# Patient Record
Sex: MALE | Race: BLACK OR AFRICAN AMERICAN | NOT HISPANIC OR LATINO
[De-identification: names, ages, dates, MRNs, and addresses within clinical notes are randomized per-mention and may not be internally consistent; named-entity substitution may affect disease eponyms.]

---

## 2017-01-23 ENCOUNTER — APPOINTMENT (OUTPATIENT)
Dept: OTOLARYNGOLOGY | Facility: CLINIC | Age: 42
End: 2017-01-23

## 2017-01-23 VITALS
SYSTOLIC BLOOD PRESSURE: 143 MMHG | WEIGHT: 249 LBS | HEIGHT: 74 IN | HEART RATE: 93 BPM | DIASTOLIC BLOOD PRESSURE: 95 MMHG | BODY MASS INDEX: 31.95 KG/M2

## 2017-03-23 ENCOUNTER — APPOINTMENT (OUTPATIENT)
Dept: OTOLARYNGOLOGY | Facility: CLINIC | Age: 42
End: 2017-03-23

## 2017-03-23 VITALS
HEART RATE: 103 BPM | DIASTOLIC BLOOD PRESSURE: 90 MMHG | SYSTOLIC BLOOD PRESSURE: 133 MMHG | HEIGHT: 74 IN | WEIGHT: 249 LBS | BODY MASS INDEX: 31.95 KG/M2

## 2017-04-14 ENCOUNTER — OUTPATIENT (OUTPATIENT)
Dept: OUTPATIENT SERVICES | Facility: HOSPITAL | Age: 42
LOS: 1 days | End: 2017-04-14
Payer: COMMERCIAL

## 2017-04-14 VITALS
TEMPERATURE: 99 F | HEIGHT: 74 IN | DIASTOLIC BLOOD PRESSURE: 110 MMHG | SYSTOLIC BLOOD PRESSURE: 150 MMHG | RESPIRATION RATE: 15 BRPM | HEART RATE: 95 BPM | WEIGHT: 250 LBS | OXYGEN SATURATION: 97 %

## 2017-04-14 DIAGNOSIS — I10 ESSENTIAL (PRIMARY) HYPERTENSION: ICD-10-CM

## 2017-04-14 DIAGNOSIS — R94.31 ABNORMAL ELECTROCARDIOGRAM [ECG] [EKG]: ICD-10-CM

## 2017-04-14 DIAGNOSIS — J38.7 OTHER DISEASES OF LARYNX: ICD-10-CM

## 2017-04-14 DIAGNOSIS — Z87.19 PERSONAL HISTORY OF OTHER DISEASES OF THE DIGESTIVE SYSTEM: Chronic | ICD-10-CM

## 2017-04-14 DIAGNOSIS — E11.9 TYPE 2 DIABETES MELLITUS WITHOUT COMPLICATIONS: ICD-10-CM

## 2017-04-14 LAB
BUN SERPL-MCNC: 12 MG/DL — SIGNIFICANT CHANGE UP (ref 7–23)
CALCIUM SERPL-MCNC: 9.2 MG/DL — SIGNIFICANT CHANGE UP (ref 8.4–10.5)
CHLORIDE SERPL-SCNC: 99 MMOL/L — SIGNIFICANT CHANGE UP (ref 98–107)
CO2 SERPL-SCNC: 27 MMOL/L — SIGNIFICANT CHANGE UP (ref 22–31)
CREAT SERPL-MCNC: 1 MG/DL — SIGNIFICANT CHANGE UP (ref 0.5–1.3)
GLUCOSE SERPL-MCNC: 189 MG/DL — HIGH (ref 70–99)
HBA1C BLD-MCNC: 9.9 % — HIGH (ref 4–5.6)
HCT VFR BLD CALC: 40.1 % — SIGNIFICANT CHANGE UP (ref 39–50)
HGB BLD-MCNC: 12.9 G/DL — LOW (ref 13–17)
HIV1 AG SER QL: SIGNIFICANT CHANGE UP
HIV1+2 AB SPEC QL: SIGNIFICANT CHANGE UP
MCHC RBC-ENTMCNC: 30 PG — SIGNIFICANT CHANGE UP (ref 27–34)
MCHC RBC-ENTMCNC: 32.2 % — SIGNIFICANT CHANGE UP (ref 32–36)
MCV RBC AUTO: 93.3 FL — SIGNIFICANT CHANGE UP (ref 80–100)
PLATELET # BLD AUTO: 198 K/UL — SIGNIFICANT CHANGE UP (ref 150–400)
PMV BLD: 10.4 FL — SIGNIFICANT CHANGE UP (ref 7–13)
POTASSIUM SERPL-MCNC: 4.1 MMOL/L — SIGNIFICANT CHANGE UP (ref 3.5–5.3)
POTASSIUM SERPL-SCNC: 4.1 MMOL/L — SIGNIFICANT CHANGE UP (ref 3.5–5.3)
RBC # BLD: 4.3 M/UL — SIGNIFICANT CHANGE UP (ref 4.2–5.8)
RBC # FLD: 13.5 % — SIGNIFICANT CHANGE UP (ref 10.3–14.5)
SODIUM SERPL-SCNC: 142 MMOL/L — SIGNIFICANT CHANGE UP (ref 135–145)
WBC # BLD: 5.76 K/UL — SIGNIFICANT CHANGE UP (ref 3.8–10.5)
WBC # FLD AUTO: 5.76 K/UL — SIGNIFICANT CHANGE UP (ref 3.8–10.5)

## 2017-04-14 PROCEDURE — 71020: CPT | Mod: 26

## 2017-04-14 PROCEDURE — 93010 ELECTROCARDIOGRAM REPORT: CPT

## 2017-04-14 NOTE — H&P PST ADULT - PROBLEM SELECTOR PLAN 2
Patient instructed not to take Metformin the night before and in am of surgery, not to take any diabetic medication in am of surgery, verbalized understanding  Will check FSBS in am of surgery

## 2017-04-14 NOTE — H&P PST ADULT - PROBLEM SELECTOR PLAN 3
/110 mmHg, to continue medication and to take in am of surgery  Patient instructed to obtain medical clearance

## 2017-04-14 NOTE — H&P PST ADULT - FAMILY HISTORY
Father  Still living? Unknown  Diabetes mellitus, Age at diagnosis: Age Unknown     Mother  Still living? Unknown  Hypertension, Age at diagnosis: Age Unknown

## 2017-04-14 NOTE — H&P PST ADULT - PROBLEM SELECTOR PLAN 1
41 yr old male scheduled for Microdirect Laryngoscopy with excision right vocal cord tumor , Co2 laser on 4/26/2017  Pre op instruction given and patient verbalized understanding  4 Positive on STOP BANG questioner, MAR precaution recommended, OR booking notified  Allergy to IV contrast dye, OR booking notified

## 2017-04-14 NOTE — H&P PST ADULT - HISTORY OF PRESENT ILLNESS
41 yr old male with PMH HTN, GERD, HLD,DM type 2presents to PST with pre op dx: other disease of Larynx for pre surgical evaluation and scheduled for Microdirect laryngoscopy with excision right vocal cord tumor 41 yr old male with PMH HTN, GERD, HLD, DM type 2presents to PST h/o hoarseness of voice since 9/2016, ENT evaluation with pre op dx: other disease of Larynx for pre surgical evaluation and scheduled for Microdirect laryngoscopy with excision right vocal cord tumor on 4/26/2017

## 2017-04-14 NOTE — H&P PST ADULT - NEGATIVE ENMT SYMPTOMS
no post-nasal discharge/no nose bleeds/no hearing difficulty/no vertigo/no gum bleeding/no ear pain/no sinus symptoms/no tinnitus/no nasal congestion/no throat pain/no nasal discharge

## 2017-04-14 NOTE — H&P PST ADULT - NSANTHOSAYNRD_GEN_A_CORE
No. MAR screening performed.  STOP BANG Legend: 0-2 = LOW Risk; 3-4 = INTERMEDIATE Risk; 5-8 = HIGH Risk

## 2017-04-14 NOTE — H&P PST ADULT - END GEN HX ROS MEA POS PC
voice change/c/o voice change since 9/2016 voice change/c/o voice change since 9/2016, stated I have a growth in my vocal cord

## 2017-04-25 ENCOUNTER — RESULT REVIEW (OUTPATIENT)
Age: 42
End: 2017-04-25

## 2017-04-26 ENCOUNTER — INPATIENT (INPATIENT)
Facility: HOSPITAL | Age: 42
LOS: 0 days | Discharge: HOME | End: 2017-04-27
Attending: OTOLARYNGOLOGY | Admitting: OTOLARYNGOLOGY
Payer: COMMERCIAL

## 2017-04-26 ENCOUNTER — RESULT REVIEW (OUTPATIENT)
Age: 42
End: 2017-04-26

## 2017-04-26 ENCOUNTER — APPOINTMENT (OUTPATIENT)
Dept: OTOLARYNGOLOGY | Facility: HOSPITAL | Age: 42
End: 2017-04-26

## 2017-04-26 VITALS
HEART RATE: 100 BPM | OXYGEN SATURATION: 97 % | TEMPERATURE: 99 F | RESPIRATION RATE: 17 BRPM | WEIGHT: 250 LBS | SYSTOLIC BLOOD PRESSURE: 144 MMHG | HEIGHT: 74 IN | DIASTOLIC BLOOD PRESSURE: 90 MMHG

## 2017-04-26 DIAGNOSIS — Z87.19 PERSONAL HISTORY OF OTHER DISEASES OF THE DIGESTIVE SYSTEM: Chronic | ICD-10-CM

## 2017-04-26 DIAGNOSIS — J38.7 OTHER DISEASES OF LARYNX: ICD-10-CM

## 2017-04-26 PROCEDURE — 88367 INSITU HYBRIDIZATION AUTO: CPT | Mod: 26

## 2017-04-26 PROCEDURE — 88365 INSITU HYBRIDIZATION (FISH): CPT | Mod: 26,59

## 2017-04-26 PROCEDURE — 88360 TUMOR IMMUNOHISTOCHEM/MANUAL: CPT | Mod: 26

## 2017-04-26 PROCEDURE — 88342 IMHCHEM/IMCYTCHM 1ST ANTB: CPT | Mod: 26,59

## 2017-04-26 PROCEDURE — 88305 TISSUE EXAM BY PATHOLOGIST: CPT | Mod: 26

## 2017-04-26 PROCEDURE — 88364 INSITU HYBRIDIZATION (FISH): CPT | Mod: 26

## 2017-04-26 PROCEDURE — 88187 FLOWCYTOMETRY/READ 2-8: CPT

## 2017-04-26 PROCEDURE — 88341 IMHCHEM/IMCYTCHM EA ADD ANTB: CPT | Mod: 26,59

## 2017-04-26 RX ORDER — SODIUM CHLORIDE 9 MG/ML
1000 INJECTION, SOLUTION INTRAVENOUS
Qty: 0 | Refills: 0 | Status: DISCONTINUED | OUTPATIENT
Start: 2017-04-26 | End: 2017-04-27

## 2017-04-26 RX ORDER — INSULIN LISPRO 100/ML
VIAL (ML) SUBCUTANEOUS
Qty: 0 | Refills: 0 | Status: DISCONTINUED | OUTPATIENT
Start: 2017-04-26 | End: 2017-04-27

## 2017-04-26 RX ORDER — FENTANYL CITRATE 50 UG/ML
25 INJECTION INTRAVENOUS
Qty: 0 | Refills: 0 | Status: DISCONTINUED | OUTPATIENT
Start: 2017-04-26 | End: 2017-04-26

## 2017-04-26 RX ORDER — DEXTROSE 50 % IN WATER 50 %
12.5 SYRINGE (ML) INTRAVENOUS ONCE
Qty: 0 | Refills: 0 | Status: DISCONTINUED | OUTPATIENT
Start: 2017-04-26 | End: 2017-04-27

## 2017-04-26 RX ORDER — ACETAMINOPHEN 500 MG
650 TABLET ORAL EVERY 6 HOURS
Qty: 0 | Refills: 0 | Status: DISCONTINUED | OUTPATIENT
Start: 2017-04-26 | End: 2017-04-27

## 2017-04-26 RX ORDER — DEXTROSE 50 % IN WATER 50 %
1 SYRINGE (ML) INTRAVENOUS ONCE
Qty: 0 | Refills: 0 | Status: DISCONTINUED | OUTPATIENT
Start: 2017-04-26 | End: 2017-04-27

## 2017-04-26 RX ORDER — ACETAMINOPHEN 500 MG
2 TABLET ORAL
Qty: 0 | Refills: 0 | COMMUNITY
Start: 2017-04-26

## 2017-04-26 RX ORDER — RANITIDINE HYDROCHLORIDE 150 MG/1
150 TABLET, FILM COATED ORAL AT BEDTIME
Qty: 0 | Refills: 0 | Status: DISCONTINUED | OUTPATIENT
Start: 2017-04-26 | End: 2017-04-27

## 2017-04-26 RX ORDER — ATORVASTATIN CALCIUM 80 MG/1
20 TABLET, FILM COATED ORAL AT BEDTIME
Qty: 0 | Refills: 0 | Status: DISCONTINUED | OUTPATIENT
Start: 2017-04-26 | End: 2017-04-27

## 2017-04-26 RX ORDER — DEXTROSE 50 % IN WATER 50 %
25 SYRINGE (ML) INTRAVENOUS ONCE
Qty: 0 | Refills: 0 | Status: DISCONTINUED | OUTPATIENT
Start: 2017-04-26 | End: 2017-04-27

## 2017-04-26 RX ORDER — GLUCAGON INJECTION, SOLUTION 0.5 MG/.1ML
1 INJECTION, SOLUTION SUBCUTANEOUS ONCE
Qty: 0 | Refills: 0 | Status: DISCONTINUED | OUTPATIENT
Start: 2017-04-26 | End: 2017-04-27

## 2017-04-26 RX ORDER — LISINOPRIL 2.5 MG/1
20 TABLET ORAL DAILY
Qty: 0 | Refills: 0 | Status: DISCONTINUED | OUTPATIENT
Start: 2017-04-26 | End: 2017-04-27

## 2017-04-26 RX ADMIN — ATORVASTATIN CALCIUM 20 MILLIGRAM(S): 80 TABLET, FILM COATED ORAL at 21:14

## 2017-04-26 RX ADMIN — RANITIDINE HYDROCHLORIDE 150 MILLIGRAM(S): 150 TABLET, FILM COATED ORAL at 22:14

## 2017-04-26 RX ADMIN — Medication 3: at 22:14

## 2017-04-26 RX ADMIN — LISINOPRIL 20 MILLIGRAM(S): 2.5 TABLET ORAL at 22:14

## 2017-04-26 NOTE — ASU DISCHARGE PLAN (ADULT/PEDIATRIC). - MEDICATION SUMMARY - MEDICATIONS TO TAKE
I will START or STAY ON the medications listed below when I get home from the hospital:    acetaminophen 325 mg oral tablet  -- 2 tab(s) by mouth every 6 hours, As needed, Mild Pain (1 - 3)  -- Indication: For pain    lisinopril 20 mg oral tablet  -- 1 tab(s) by mouth once a day pm  -- Indication: For home    metFORMIN 1000 mg oral tablet  -- 1 tab(s) by mouth 2 times a day  -- Indication: For home    glipiZIDE 5 mg oral tablet  -- 1 tab(s) by mouth 2 times a day  -- Indication: For home    atorvastatin 20 mg oral tablet  -- 1 tab(s) by mouth once a day (at bedtime)  -- Indication: For home    raNITIdine 150 mg oral tablet  -- 1 tab(s) by mouth once a day (at bedtime)  -- Indication: For home    mometasone 50 mcg/inh nasal spray  -- 2 spray(s) into nose once a day, As Needed  -- Indication: For home

## 2017-04-27 ENCOUNTER — TRANSCRIPTION ENCOUNTER (OUTPATIENT)
Age: 42
End: 2017-04-27

## 2017-04-27 VITALS
HEART RATE: 88 BPM | TEMPERATURE: 98 F | SYSTOLIC BLOOD PRESSURE: 134 MMHG | DIASTOLIC BLOOD PRESSURE: 92 MMHG | OXYGEN SATURATION: 100 % | RESPIRATION RATE: 18 BRPM

## 2017-04-27 RX ADMIN — Medication 1: at 08:46

## 2017-04-27 NOTE — DISCHARGE NOTE ADULT - MEDICATION SUMMARY - MEDICATIONS TO TAKE
I will START or STAY ON the medications listed below when I get home from the hospital:    acetaminophen 325 mg oral tablet  -- 2 tab(s) by mouth every 6 hours, As needed, Mild Pain (1 - 3)  -- Indication: For pain    lisinopril 20 mg oral tablet  -- 1 tab(s) by mouth once a day pm  -- Indication: For HTN    metFORMIN 1000 mg oral tablet  -- 1 tab(s) by mouth 2 times a day  -- Indication: For DM    glipiZIDE 5 mg oral tablet  -- 1 tab(s) by mouth 2 times a day  -- Indication: For DM    atorvastatin 20 mg oral tablet  -- 1 tab(s) by mouth once a day (at bedtime)  -- Indication: For cholesterol    raNITIdine 150 mg oral tablet  -- 1 tab(s) by mouth once a day (at bedtime)  -- Indication: For Outpt med    mometasone 50 mcg/inh nasal spray  -- 2 spray(s) into nose once a day, As Needed  -- Indication: For Outpt med

## 2017-04-27 NOTE — DISCHARGE NOTE ADULT - PATIENT PORTAL LINK FT
“You can access the Follow[employer] Patient Portal, offered by [facility], by registering with the following website: [email or web address]”

## 2017-04-27 NOTE — DISCHARGE NOTE ADULT - CARE PROVIDER_API CALL
Olvin Onofre (MD; PhD), Otolaryngology  [address] [facility] [place] [address]  Phone: [phone]  Fax: [phone]

## 2017-04-27 NOTE — DISCHARGE NOTE ADULT - HOSPITAL COURSE
41 year old male S/P direct laryngoscopy and biopsy of vocal cord lesion on 4/25/17. Discharged home ob 4/26/17. 41 year old male S/P direct laryngoscopy and biopsy of vocal cord lesion on 4/26/17. Discharged home ob 4/27/17.

## 2017-04-27 NOTE — DISCHARGE NOTE ADULT - CARE PLAN
Principal Discharge DX:	Vocal cord polyp  Goal:	biopsy of lesion  Instructions for follow-up, activity and diet:	ADA diet

## 2017-04-29 ENCOUNTER — TRANSCRIPTION ENCOUNTER (OUTPATIENT)
Age: 42
End: 2017-04-29

## 2017-05-04 ENCOUNTER — APPOINTMENT (OUTPATIENT)
Dept: OTOLARYNGOLOGY | Facility: CLINIC | Age: 42
End: 2017-05-04

## 2017-05-04 VITALS
HEART RATE: 103 BPM | HEIGHT: 74 IN | SYSTOLIC BLOOD PRESSURE: 148 MMHG | WEIGHT: 250 LBS | DIASTOLIC BLOOD PRESSURE: 94 MMHG | BODY MASS INDEX: 32.08 KG/M2

## 2017-05-11 ENCOUNTER — APPOINTMENT (OUTPATIENT)
Dept: OTOLARYNGOLOGY | Facility: CLINIC | Age: 42
End: 2017-05-11

## 2017-06-01 ENCOUNTER — APPOINTMENT (OUTPATIENT)
Dept: OTOLARYNGOLOGY | Facility: CLINIC | Age: 42
End: 2017-06-01

## 2017-06-01 VITALS
DIASTOLIC BLOOD PRESSURE: 92 MMHG | HEART RATE: 100 BPM | RESPIRATION RATE: 18 BRPM | SYSTOLIC BLOOD PRESSURE: 148 MMHG | HEIGHT: 74 IN | WEIGHT: 25 LBS | BODY MASS INDEX: 3.21 KG/M2

## 2017-07-05 ENCOUNTER — LABORATORY RESULT (OUTPATIENT)
Age: 42
End: 2017-07-05

## 2017-07-05 ENCOUNTER — APPOINTMENT (OUTPATIENT)
Dept: RHEUMATOLOGY | Facility: CLINIC | Age: 42
End: 2017-07-05

## 2017-07-05 VITALS
HEART RATE: 106 BPM | HEIGHT: 74 IN | DIASTOLIC BLOOD PRESSURE: 102 MMHG | OXYGEN SATURATION: 98 % | TEMPERATURE: 98.9 F | BODY MASS INDEX: 31.44 KG/M2 | SYSTOLIC BLOOD PRESSURE: 154 MMHG | WEIGHT: 245 LBS

## 2017-07-05 LAB
ALBUMIN SERPL ELPH-MCNC: 4.5 G/DL
ALP BLD-CCNC: 47 U/L
ALT SERPL-CCNC: 27 U/L
ANION GAP SERPL CALC-SCNC: 17 MMOL/L
APPEARANCE: CLEAR
APPEARANCE: CLEAR
AST SERPL-CCNC: 17 U/L
BASOPHILS # BLD AUTO: 0.01 K/UL
BASOPHILS NFR BLD AUTO: 0.2 %
BILIRUB SERPL-MCNC: 0.3 MG/DL
BILIRUBIN URINE: NEGATIVE
BILIRUBIN URINE: NEGATIVE
BLOOD URINE: NEGATIVE
BLOOD URINE: NEGATIVE
BUN SERPL-MCNC: 12 MG/DL
CALCIUM SERPL-MCNC: 10.1 MG/DL
CHLORIDE SERPL-SCNC: 103 MMOL/L
CO2 SERPL-SCNC: 23 MMOL/L
COLOR: YELLOW
COLOR: YELLOW
CREAT SERPL-MCNC: 1.05 MG/DL
CRP SERPL-MCNC: 0.5 MG/DL
EOSINOPHIL # BLD AUTO: 0.17 K/UL
EOSINOPHIL NFR BLD AUTO: 2.6 %
ERYTHROCYTE [SEDIMENTATION RATE] IN BLOOD BY WESTERGREN METHOD: 14 MM/HR
GLUCOSE QUALITATIVE U: 1000 MG/DL
GLUCOSE QUALITATIVE U: 1000 MG/DL
GLUCOSE SERPL-MCNC: 104 MG/DL
HCT VFR BLD CALC: 41.3 %
HGB BLD-MCNC: 13.8 G/DL
IMM GRANULOCYTES NFR BLD AUTO: 0.2 %
KETONES URINE: ABNORMAL
KETONES URINE: ABNORMAL
LEUKOCYTE ESTERASE URINE: NEGATIVE
LEUKOCYTE ESTERASE URINE: NEGATIVE
LYMPHOCYTES # BLD AUTO: 2.8 K/UL
LYMPHOCYTES NFR BLD AUTO: 43.1 %
MAN DIFF?: NORMAL
MCHC RBC-ENTMCNC: 30.4 PG
MCHC RBC-ENTMCNC: 33.4 GM/DL
MCV RBC AUTO: 91 FL
MONOCYTES # BLD AUTO: 0.63 K/UL
MONOCYTES NFR BLD AUTO: 9.7 %
NEUTROPHILS # BLD AUTO: 2.88 K/UL
NEUTROPHILS NFR BLD AUTO: 44.2 %
NITRITE URINE: NEGATIVE
NITRITE URINE: NEGATIVE
PH URINE: 5.5
PH URINE: 5.5
PLATELET # BLD AUTO: 245 K/UL
POTASSIUM SERPL-SCNC: 4.4 MMOL/L
PROT SERPL-MCNC: 7.4 G/DL
PROTEIN URINE: NEGATIVE MG/DL
PROTEIN URINE: NEGATIVE MG/DL
RBC # BLD: 4.54 M/UL
RBC # FLD: 13.3 %
SODIUM SERPL-SCNC: 143 MMOL/L
SPECIFIC GRAVITY URINE: 1.04
SPECIFIC GRAVITY URINE: 1.04
UROBILINOGEN URINE: NORMAL MG/DL
UROBILINOGEN URINE: NORMAL MG/DL
WBC # FLD AUTO: 6.5 K/UL

## 2017-07-06 LAB
25(OH)D3 SERPL-MCNC: 25.7 NG/ML
C3 SERPL-MCNC: 134 MG/DL
C4 SERPL-MCNC: 36 MG/DL
CREAT SPEC-SCNC: 268 MG/DL
CREAT/PROT UR: 0.1 RATIO
DSDNA AB SER-ACNC: <12 IU/ML
ENA RNP AB SER IA-ACNC: <0.2 AL
ENA SCL70 IGG SER IA-ACNC: <0.2 AL
ENA SM AB SER IA-ACNC: <0.2 AL
ENA SS-A AB SER IA-ACNC: <0.2 AL
ENA SS-B AB SER IA-ACNC: <0.2 AL
MPO AB + PR3 PNL SER: NORMAL
PROT UR-MCNC: 15 MG/DL
PROT UR-MCNC: 15 MG/DL

## 2017-07-07 LAB
ACE BLD-CCNC: 27 U/L
ANA SER IF-ACNC: NEGATIVE

## 2017-07-08 ENCOUNTER — APPOINTMENT (OUTPATIENT)
Dept: CT IMAGING | Facility: IMAGING CENTER | Age: 42
End: 2017-07-08

## 2017-07-08 ENCOUNTER — OUTPATIENT (OUTPATIENT)
Dept: OUTPATIENT SERVICES | Facility: HOSPITAL | Age: 42
LOS: 1 days | End: 2017-07-08

## 2017-07-08 DIAGNOSIS — Z87.19 PERSONAL HISTORY OF OTHER DISEASES OF THE DIGESTIVE SYSTEM: Chronic | ICD-10-CM

## 2017-07-08 DIAGNOSIS — M31.30 WEGENER'S GRANULOMATOSIS WITHOUT RENAL INVOLVEMENT: ICD-10-CM

## 2017-07-13 ENCOUNTER — APPOINTMENT (OUTPATIENT)
Dept: OTOLARYNGOLOGY | Facility: CLINIC | Age: 42
End: 2017-07-13

## 2017-07-19 ENCOUNTER — FORM ENCOUNTER (OUTPATIENT)
Age: 42
End: 2017-07-19

## 2017-07-20 ENCOUNTER — APPOINTMENT (OUTPATIENT)
Dept: CT IMAGING | Facility: IMAGING CENTER | Age: 42
End: 2017-07-20

## 2017-07-20 ENCOUNTER — OUTPATIENT (OUTPATIENT)
Dept: OUTPATIENT SERVICES | Facility: HOSPITAL | Age: 42
LOS: 1 days | End: 2017-07-20
Payer: COMMERCIAL

## 2017-07-20 DIAGNOSIS — Z87.19 PERSONAL HISTORY OF OTHER DISEASES OF THE DIGESTIVE SYSTEM: Chronic | ICD-10-CM

## 2017-07-20 DIAGNOSIS — M31.30 WEGENER'S GRANULOMATOSIS WITHOUT RENAL INVOLVEMENT: ICD-10-CM

## 2017-07-20 PROCEDURE — 71250 CT THORAX DX C-: CPT

## 2017-07-20 PROCEDURE — 70490 CT SOFT TISSUE NECK W/O DYE: CPT

## 2017-07-25 ENCOUNTER — APPOINTMENT (OUTPATIENT)
Dept: RHEUMATOLOGY | Facility: CLINIC | Age: 42
End: 2017-07-25

## 2017-08-14 ENCOUNTER — APPOINTMENT (OUTPATIENT)
Dept: RHEUMATOLOGY | Facility: CLINIC | Age: 42
End: 2017-08-14

## 2017-08-22 ENCOUNTER — APPOINTMENT (OUTPATIENT)
Dept: RHEUMATOLOGY | Facility: CLINIC | Age: 42
End: 2017-08-22
Payer: COMMERCIAL

## 2017-08-22 VITALS
TEMPERATURE: 98.3 F | BODY MASS INDEX: 31.57 KG/M2 | SYSTOLIC BLOOD PRESSURE: 147 MMHG | HEART RATE: 89 BPM | DIASTOLIC BLOOD PRESSURE: 93 MMHG | WEIGHT: 246 LBS | OXYGEN SATURATION: 96 % | HEIGHT: 74 IN

## 2017-08-22 PROCEDURE — 99214 OFFICE O/P EST MOD 30 MIN: CPT

## 2017-08-23 RX ORDER — METHOTREXATE 2.5 MG/1
2.5 TABLET ORAL
Qty: 28 | Refills: 3 | Status: DISCONTINUED | COMMUNITY
Start: 2017-08-22 | End: 2017-08-23

## 2017-08-23 RX ORDER — FOLIC ACID 1 MG/1
1 TABLET ORAL DAILY
Qty: 30 | Refills: 5 | Status: DISCONTINUED | COMMUNITY
Start: 2017-08-22 | End: 2017-08-23

## 2017-08-24 ENCOUNTER — RESULT REVIEW (OUTPATIENT)
Age: 42
End: 2017-08-24

## 2017-08-24 LAB
IGG SUBSET TOTAL IGG: 1020 MG/DL
IGG1 SER-MCNC: 521 MG/DL
IGG2 SER-MCNC: 430 MG/DL
IGG3 SER-MCNC: 90.9 MG/DL
IGG4 SER-MCNC: 36.8 MG/DL

## 2017-08-25 LAB
DEPRECATED KAPPA LC FREE/LAMBDA SER: 1.15 RATIO
IGA SER QL IEP: 328 MG/DL
IGG SER QL IEP: 944 MG/DL
IGM SER QL IEP: 76 MG/DL
KAPPA LC CSF-MCNC: 1.56 MG/DL
KAPPA LC SERPL-MCNC: 1.8 MG/DL

## 2017-09-05 ENCOUNTER — APPOINTMENT (OUTPATIENT)
Dept: RHEUMATOLOGY | Facility: CLINIC | Age: 42
End: 2017-09-05

## 2017-09-07 ENCOUNTER — APPOINTMENT (OUTPATIENT)
Dept: OTOLARYNGOLOGY | Facility: CLINIC | Age: 42
End: 2017-09-07
Payer: COMMERCIAL

## 2017-09-07 VITALS
HEIGHT: 72 IN | WEIGHT: 245 LBS | DIASTOLIC BLOOD PRESSURE: 100 MMHG | BODY MASS INDEX: 33.18 KG/M2 | HEART RATE: 89 BPM | SYSTOLIC BLOOD PRESSURE: 148 MMHG

## 2017-09-07 PROCEDURE — 99214 OFFICE O/P EST MOD 30 MIN: CPT | Mod: 25

## 2017-09-07 PROCEDURE — 31579 LARYNGOSCOPY TELESCOPIC: CPT

## 2017-09-07 RX ORDER — PREDNISONE 10 MG/1
10 TABLET ORAL DAILY
Qty: 1 | Refills: 0 | Status: DISCONTINUED | COMMUNITY
Start: 2017-05-04 | End: 2017-09-07

## 2017-09-07 RX ORDER — OMEPRAZOLE 40 MG/1
40 CAPSULE, DELAYED RELEASE ORAL
Qty: 90 | Refills: 0 | Status: DISCONTINUED | COMMUNITY
Start: 2017-05-04 | End: 2017-09-07

## 2017-09-07 RX ORDER — RANITIDINE HYDROCHLORIDE 150 MG/1
150 CAPSULE ORAL
Qty: 90 | Refills: 0 | Status: DISCONTINUED | COMMUNITY
Start: 2017-03-23 | End: 2017-09-07

## 2017-09-22 ENCOUNTER — APPOINTMENT (OUTPATIENT)
Dept: RHEUMATOLOGY | Facility: CLINIC | Age: 42
End: 2017-09-22
Payer: COMMERCIAL

## 2017-09-22 VITALS
WEIGHT: 249 LBS | BODY MASS INDEX: 33.72 KG/M2 | HEART RATE: 98 BPM | SYSTOLIC BLOOD PRESSURE: 152 MMHG | HEIGHT: 72 IN | DIASTOLIC BLOOD PRESSURE: 87 MMHG | TEMPERATURE: 98.9 F

## 2017-09-22 PROCEDURE — 99214 OFFICE O/P EST MOD 30 MIN: CPT

## 2017-10-24 ENCOUNTER — APPOINTMENT (OUTPATIENT)
Dept: RHEUMATOLOGY | Facility: CLINIC | Age: 42
End: 2017-10-24
Payer: COMMERCIAL

## 2017-10-24 VITALS
WEIGHT: 253 LBS | BODY MASS INDEX: 34.27 KG/M2 | HEART RATE: 101 BPM | TEMPERATURE: 98.8 F | SYSTOLIC BLOOD PRESSURE: 144 MMHG | DIASTOLIC BLOOD PRESSURE: 95 MMHG | OXYGEN SATURATION: 97 % | HEIGHT: 72 IN

## 2017-10-24 LAB
ALBUMIN SERPL ELPH-MCNC: 3.9 G/DL
ALP BLD-CCNC: 43 U/L
ALT SERPL-CCNC: 23 U/L
ANION GAP SERPL CALC-SCNC: 16 MMOL/L
AST SERPL-CCNC: 14 U/L
BASOPHILS # BLD AUTO: 0.02 K/UL
BASOPHILS NFR BLD AUTO: 0.4 %
BILIRUB SERPL-MCNC: 0.3 MG/DL
BUN SERPL-MCNC: 12 MG/DL
CALCIUM SERPL-MCNC: 9.5 MG/DL
CHLORIDE SERPL-SCNC: 102 MMOL/L
CO2 SERPL-SCNC: 23 MMOL/L
CREAT SERPL-MCNC: 1.09 MG/DL
CRP SERPL-MCNC: 0.6 MG/DL
EOSINOPHIL # BLD AUTO: 0.2 K/UL
EOSINOPHIL NFR BLD AUTO: 3.8 %
ERYTHROCYTE [SEDIMENTATION RATE] IN BLOOD BY WESTERGREN METHOD: 17 MM/HR
GLUCOSE SERPL-MCNC: 220 MG/DL
HCT VFR BLD CALC: 42.1 %
HGB BLD-MCNC: 13.5 G/DL
IMM GRANULOCYTES NFR BLD AUTO: 0.4 %
LYMPHOCYTES # BLD AUTO: 2.17 K/UL
LYMPHOCYTES NFR BLD AUTO: 41.5 %
MAN DIFF?: NORMAL
MCHC RBC-ENTMCNC: 29.9 PG
MCHC RBC-ENTMCNC: 32.1 GM/DL
MCV RBC AUTO: 93.1 FL
MONOCYTES # BLD AUTO: 0.49 K/UL
MONOCYTES NFR BLD AUTO: 9.4 %
NEUTROPHILS # BLD AUTO: 2.33 K/UL
NEUTROPHILS NFR BLD AUTO: 44.5 %
PLATELET # BLD AUTO: 214 K/UL
POTASSIUM SERPL-SCNC: 4.1 MMOL/L
PROT SERPL-MCNC: 6.7 G/DL
RBC # BLD: 4.52 M/UL
RBC # FLD: 13.8 %
SODIUM SERPL-SCNC: 141 MMOL/L
WBC # FLD AUTO: 5.23 K/UL

## 2017-10-24 PROCEDURE — 90686 IIV4 VACC NO PRSV 0.5 ML IM: CPT

## 2017-10-24 PROCEDURE — 99213 OFFICE O/P EST LOW 20 MIN: CPT | Mod: 25

## 2017-10-24 PROCEDURE — G0008: CPT

## 2017-10-25 ENCOUNTER — APPOINTMENT (OUTPATIENT)
Dept: OTOLARYNGOLOGY | Facility: CLINIC | Age: 42
End: 2017-10-25
Payer: COMMERCIAL

## 2017-10-25 VITALS
HEART RATE: 109 BPM | DIASTOLIC BLOOD PRESSURE: 95 MMHG | BODY MASS INDEX: 32.34 KG/M2 | HEIGHT: 74 IN | SYSTOLIC BLOOD PRESSURE: 145 MMHG | WEIGHT: 252 LBS

## 2017-10-25 DIAGNOSIS — Z87.19 PERSONAL HISTORY OF OTHER DISEASES OF THE DIGESTIVE SYSTEM: ICD-10-CM

## 2017-10-25 PROCEDURE — 31579 LARYNGOSCOPY TELESCOPIC: CPT

## 2017-10-25 PROCEDURE — 99214 OFFICE O/P EST MOD 30 MIN: CPT | Mod: 25

## 2017-12-01 ENCOUNTER — APPOINTMENT (OUTPATIENT)
Dept: RHEUMATOLOGY | Facility: CLINIC | Age: 42
End: 2017-12-01
Payer: COMMERCIAL

## 2017-12-01 VITALS
SYSTOLIC BLOOD PRESSURE: 130 MMHG | TEMPERATURE: 99 F | DIASTOLIC BLOOD PRESSURE: 91 MMHG | BODY MASS INDEX: 32.34 KG/M2 | WEIGHT: 252 LBS | HEART RATE: 102 BPM | OXYGEN SATURATION: 96 % | HEIGHT: 74 IN

## 2017-12-01 LAB
APPEARANCE: CLEAR
BASOPHILS # BLD AUTO: 0.01 K/UL
BASOPHILS NFR BLD AUTO: 0.2 %
BILIRUBIN URINE: NEGATIVE
BLOOD URINE: NEGATIVE
COLOR: YELLOW
EOSINOPHIL # BLD AUTO: 0.12 K/UL
EOSINOPHIL NFR BLD AUTO: 2.3 %
ERYTHROCYTE [SEDIMENTATION RATE] IN BLOOD BY WESTERGREN METHOD: 14 MM/HR
GLUCOSE QUALITATIVE U: 250 MG/DL
HCT VFR BLD CALC: 41.4 %
HGB BLD-MCNC: 13.7 G/DL
IMM GRANULOCYTES NFR BLD AUTO: 0.4 %
KETONES URINE: NEGATIVE
LEUKOCYTE ESTERASE URINE: NEGATIVE
LYMPHOCYTES # BLD AUTO: 2.26 K/UL
LYMPHOCYTES NFR BLD AUTO: 43.1 %
MAN DIFF?: NORMAL
MCHC RBC-ENTMCNC: 31.3 PG
MCHC RBC-ENTMCNC: 33.1 GM/DL
MCV RBC AUTO: 94.5 FL
MONOCYTES # BLD AUTO: 0.35 K/UL
MONOCYTES NFR BLD AUTO: 6.7 %
NEUTROPHILS # BLD AUTO: 2.48 K/UL
NEUTROPHILS NFR BLD AUTO: 47.3 %
NITRITE URINE: NEGATIVE
PH URINE: 6
PLATELET # BLD AUTO: 233 K/UL
PROTEIN URINE: NEGATIVE MG/DL
RBC # BLD: 4.38 M/UL
RBC # FLD: 15.3 %
SPECIFIC GRAVITY URINE: 1.02
UROBILINOGEN URINE: 1 MG/DL
WBC # FLD AUTO: 5.24 K/UL

## 2017-12-01 PROCEDURE — 99214 OFFICE O/P EST MOD 30 MIN: CPT

## 2017-12-01 RX ORDER — PREDNISONE 20 MG/1
20 TABLET ORAL DAILY
Qty: 60 | Refills: 1 | Status: DISCONTINUED | COMMUNITY
Start: 2017-09-22 | End: 2017-12-01

## 2017-12-01 RX ORDER — SITAGLIPTIN 100 MG/1
100 TABLET, FILM COATED ORAL
Qty: 90 | Refills: 0 | Status: DISCONTINUED | COMMUNITY
Start: 2017-10-09 | End: 2017-12-01

## 2017-12-01 RX ORDER — NYSTATIN 100000 [USP'U]/ML
100000 SUSPENSION ORAL 3 TIMES DAILY
Qty: 350 | Refills: 0 | Status: DISCONTINUED | COMMUNITY
Start: 2017-05-11 | End: 2017-12-01

## 2017-12-02 LAB
25(OH)D3 SERPL-MCNC: 28.3 NG/ML
ALBUMIN SERPL ELPH-MCNC: 4.1 G/DL
ALP BLD-CCNC: 41 U/L
ALT SERPL-CCNC: 51 U/L
ANION GAP SERPL CALC-SCNC: 15 MMOL/L
AST SERPL-CCNC: 43 U/L
BILIRUB SERPL-MCNC: 0.7 MG/DL
BUN SERPL-MCNC: 14 MG/DL
CALCIUM SERPL-MCNC: 9.5 MG/DL
CHLORIDE SERPL-SCNC: 103 MMOL/L
CO2 SERPL-SCNC: 25 MMOL/L
CREAT SERPL-MCNC: 1.05 MG/DL
CRP SERPL-MCNC: 0.4 MG/DL
GLUCOSE SERPL-MCNC: 217 MG/DL
POTASSIUM SERPL-SCNC: 4.7 MMOL/L
PROT SERPL-MCNC: 7 G/DL
SODIUM SERPL-SCNC: 143 MMOL/L

## 2018-01-04 ENCOUNTER — APPOINTMENT (OUTPATIENT)
Dept: OTOLARYNGOLOGY | Facility: CLINIC | Age: 43
End: 2018-01-04

## 2018-01-18 ENCOUNTER — APPOINTMENT (OUTPATIENT)
Dept: OTOLARYNGOLOGY | Facility: CLINIC | Age: 43
End: 2018-01-18
Payer: COMMERCIAL

## 2018-01-18 VITALS
HEIGHT: 74 IN | DIASTOLIC BLOOD PRESSURE: 91 MMHG | WEIGHT: 252 LBS | BODY MASS INDEX: 32.34 KG/M2 | SYSTOLIC BLOOD PRESSURE: 130 MMHG | HEART RATE: 100 BPM

## 2018-01-18 DIAGNOSIS — R49.0 DYSPHONIA: ICD-10-CM

## 2018-01-18 PROCEDURE — 31579 LARYNGOSCOPY TELESCOPIC: CPT

## 2018-01-18 PROCEDURE — 99214 OFFICE O/P EST MOD 30 MIN: CPT | Mod: 25

## 2018-01-24 ENCOUNTER — APPOINTMENT (OUTPATIENT)
Dept: OTOLARYNGOLOGY | Facility: CLINIC | Age: 43
End: 2018-01-24

## 2018-01-25 ENCOUNTER — APPOINTMENT (OUTPATIENT)
Dept: RHEUMATOLOGY | Facility: CLINIC | Age: 43
End: 2018-01-25
Payer: COMMERCIAL

## 2018-01-25 VITALS
OXYGEN SATURATION: 97 % | TEMPERATURE: 98.7 F | SYSTOLIC BLOOD PRESSURE: 149 MMHG | BODY MASS INDEX: 32.98 KG/M2 | HEART RATE: 111 BPM | WEIGHT: 257 LBS | HEIGHT: 74 IN | DIASTOLIC BLOOD PRESSURE: 99 MMHG

## 2018-01-25 PROCEDURE — 99214 OFFICE O/P EST MOD 30 MIN: CPT

## 2018-01-26 LAB
ALBUMIN SERPL ELPH-MCNC: 4.3 G/DL
ALP BLD-CCNC: 42 U/L
ALT SERPL-CCNC: 32 U/L
AST SERPL-CCNC: 17 U/L
BILIRUB DIRECT SERPL-MCNC: 0.1 MG/DL
BILIRUB INDIRECT SERPL-MCNC: 0.1 MG/DL
BILIRUB SERPL-MCNC: 0.2 MG/DL
GGT SERPL-CCNC: 57 U/L
PROT SERPL-MCNC: 7.3 G/DL

## 2018-02-09 ENCOUNTER — APPOINTMENT (OUTPATIENT)
Dept: UROLOGY | Facility: CLINIC | Age: 43
End: 2018-02-09
Payer: COMMERCIAL

## 2018-02-09 VITALS — HEART RATE: 100 BPM | OXYGEN SATURATION: 99 % | DIASTOLIC BLOOD PRESSURE: 85 MMHG | SYSTOLIC BLOOD PRESSURE: 140 MMHG

## 2018-02-09 DIAGNOSIS — N52.9 MALE ERECTILE DYSFUNCTION, UNSPECIFIED: ICD-10-CM

## 2018-02-09 PROCEDURE — 99204 OFFICE O/P NEW MOD 45 MIN: CPT

## 2018-02-15 ENCOUNTER — APPOINTMENT (OUTPATIENT)
Dept: OTOLARYNGOLOGY | Facility: CLINIC | Age: 43
End: 2018-02-15

## 2018-02-27 ENCOUNTER — APPOINTMENT (OUTPATIENT)
Dept: RHEUMATOLOGY | Facility: CLINIC | Age: 43
End: 2018-02-27
Payer: COMMERCIAL

## 2018-02-27 LAB
APPEARANCE: CLEAR
BILIRUBIN URINE: NEGATIVE
BLOOD URINE: NEGATIVE
COLOR: YELLOW
GLUCOSE QUALITATIVE U: 1000 MG/DL
KETONES URINE: NEGATIVE
LEUKOCYTE ESTERASE URINE: NEGATIVE
NITRITE URINE: NEGATIVE
PH URINE: 6
PROTEIN URINE: NEGATIVE MG/DL
SPECIFIC GRAVITY URINE: 1.03
UROBILINOGEN URINE: NEGATIVE MG/DL

## 2018-02-27 PROCEDURE — 99213 OFFICE O/P EST LOW 20 MIN: CPT

## 2018-02-28 LAB
25(OH)D3 SERPL-MCNC: 40.8 NG/ML
ALBUMIN SERPL ELPH-MCNC: 4.4 G/DL
ALP BLD-CCNC: 45 U/L
ALT SERPL-CCNC: 25 U/L
ANION GAP SERPL CALC-SCNC: 18 MMOL/L
AST SERPL-CCNC: 14 U/L
BASOPHILS # BLD AUTO: 0.01 K/UL
BASOPHILS NFR BLD AUTO: 0.1 %
BILIRUB SERPL-MCNC: 0.3 MG/DL
BUN SERPL-MCNC: 17 MG/DL
CALCIUM SERPL-MCNC: 10.3 MG/DL
CHLORIDE SERPL-SCNC: 96 MMOL/L
CO2 SERPL-SCNC: 22 MMOL/L
CREAT SERPL-MCNC: 1.09 MG/DL
CRP SERPL-MCNC: 0.7 MG/DL
EOSINOPHIL # BLD AUTO: 0.01 K/UL
EOSINOPHIL NFR BLD AUTO: 0.1 %
ERYTHROCYTE [SEDIMENTATION RATE] IN BLOOD BY WESTERGREN METHOD: 33 MM/HR
GLUCOSE SERPL-MCNC: 299 MG/DL
HCT VFR BLD CALC: 45.1 %
HGB BLD-MCNC: 14.3 G/DL
IMM GRANULOCYTES NFR BLD AUTO: 0.1 %
LYMPHOCYTES # BLD AUTO: 1.35 K/UL
LYMPHOCYTES NFR BLD AUTO: 19.3 %
MAN DIFF?: NORMAL
MCHC RBC-ENTMCNC: 30.4 PG
MCHC RBC-ENTMCNC: 31.7 GM/DL
MCV RBC AUTO: 96 FL
MONOCYTES # BLD AUTO: 0.19 K/UL
MONOCYTES NFR BLD AUTO: 2.7 %
NEUTROPHILS # BLD AUTO: 5.42 K/UL
NEUTROPHILS NFR BLD AUTO: 77.7 %
PLATELET # BLD AUTO: 242 K/UL
POTASSIUM SERPL-SCNC: 5.1 MMOL/L
PROT SERPL-MCNC: 7.5 G/DL
RBC # BLD: 4.7 M/UL
RBC # FLD: 13.4 %
SODIUM SERPL-SCNC: 136 MMOL/L
WBC # FLD AUTO: 6.99 K/UL

## 2018-03-01 ENCOUNTER — APPOINTMENT (OUTPATIENT)
Dept: OTOLARYNGOLOGY | Facility: CLINIC | Age: 43
End: 2018-03-01
Payer: COMMERCIAL

## 2018-03-01 VITALS
HEART RATE: 102 BPM | HEIGHT: 74 IN | DIASTOLIC BLOOD PRESSURE: 97 MMHG | WEIGHT: 257 LBS | SYSTOLIC BLOOD PRESSURE: 146 MMHG | BODY MASS INDEX: 32.98 KG/M2

## 2018-03-01 PROCEDURE — 31579 LARYNGOSCOPY TELESCOPIC: CPT

## 2018-03-01 PROCEDURE — 99214 OFFICE O/P EST MOD 30 MIN: CPT | Mod: 25

## 2018-04-25 ENCOUNTER — OUTPATIENT (OUTPATIENT)
Dept: OUTPATIENT SERVICES | Facility: HOSPITAL | Age: 43
LOS: 1 days | Discharge: HOME | End: 2018-04-25
Payer: COMMERCIAL

## 2018-04-25 DIAGNOSIS — M31.30 WEGENER'S GRANULOMATOSIS WITHOUT RENAL INVOLVEMENT: Chronic | ICD-10-CM

## 2018-04-25 DIAGNOSIS — Z87.19 PERSONAL HISTORY OF OTHER DISEASES OF THE DIGESTIVE SYSTEM: Chronic | ICD-10-CM

## 2018-04-25 DIAGNOSIS — R07.9 CHEST PAIN, UNSPECIFIED: ICD-10-CM

## 2018-04-25 LAB
BUN SERPL-MCNC: 19 MG/DL — SIGNIFICANT CHANGE UP (ref 7–23)
CALCIUM SERPL-MCNC: 9.2 MG/DL — SIGNIFICANT CHANGE UP (ref 8.4–10.5)
CHLORIDE SERPL-SCNC: 97 MMOL/L — LOW (ref 98–107)
CO2 SERPL-SCNC: 24 MMOL/L — SIGNIFICANT CHANGE UP (ref 22–31)
CREAT SERPL-MCNC: 1.2 MG/DL — SIGNIFICANT CHANGE UP (ref 0.5–1.3)
GLUCOSE BLDC GLUCOMTR-MCNC: 285 MG/DL — HIGH (ref 70–99)
GLUCOSE BLDC GLUCOMTR-MCNC: 300 MG/DL — HIGH (ref 70–99)
GLUCOSE BLDC GLUCOMTR-MCNC: 389 MG/DL — HIGH (ref 70–99)
GLUCOSE SERPL-MCNC: 408 MG/DL — HIGH (ref 70–99)
HBA1C BLD-MCNC: 9.3 % — HIGH (ref 4–5.6)
HCT VFR BLD CALC: 40.2 % — SIGNIFICANT CHANGE UP (ref 39–50)
HGB BLD-MCNC: 13.1 G/DL — SIGNIFICANT CHANGE UP (ref 13–17)
MCHC RBC-ENTMCNC: 30.6 PG — SIGNIFICANT CHANGE UP (ref 27–34)
MCHC RBC-ENTMCNC: 32.6 % — SIGNIFICANT CHANGE UP (ref 32–36)
MCV RBC AUTO: 93.9 FL — SIGNIFICANT CHANGE UP (ref 80–100)
NRBC # FLD: 0 — SIGNIFICANT CHANGE UP
PLATELET # BLD AUTO: 228 K/UL — SIGNIFICANT CHANGE UP (ref 150–400)
PMV BLD: 10.7 FL — SIGNIFICANT CHANGE UP (ref 7–13)
POTASSIUM SERPL-MCNC: 4.7 MMOL/L — SIGNIFICANT CHANGE UP (ref 3.5–5.3)
POTASSIUM SERPL-SCNC: 4.7 MMOL/L — SIGNIFICANT CHANGE UP (ref 3.5–5.3)
RBC # BLD: 4.28 M/UL — SIGNIFICANT CHANGE UP (ref 4.2–5.8)
RBC # FLD: 12.9 % — SIGNIFICANT CHANGE UP (ref 10.3–14.5)
SODIUM SERPL-SCNC: 136 MMOL/L — SIGNIFICANT CHANGE UP (ref 135–145)
WBC # BLD: 6.57 K/UL — SIGNIFICANT CHANGE UP (ref 3.8–10.5)
WBC # FLD AUTO: 6.57 K/UL — SIGNIFICANT CHANGE UP (ref 3.8–10.5)

## 2018-04-25 PROCEDURE — 93458 L HRT ARTERY/VENTRICLE ANGIO: CPT | Mod: 26

## 2018-04-25 PROCEDURE — 93010 ELECTROCARDIOGRAM REPORT: CPT

## 2018-04-25 RX ORDER — RANITIDINE HYDROCHLORIDE 150 MG/1
1 TABLET, FILM COATED ORAL
Qty: 0 | Refills: 0 | COMMUNITY

## 2018-04-25 RX ORDER — SODIUM CHLORIDE 9 MG/ML
3 INJECTION INTRAMUSCULAR; INTRAVENOUS; SUBCUTANEOUS EVERY 8 HOURS
Qty: 0 | Refills: 0 | Status: DISCONTINUED | OUTPATIENT
Start: 2018-04-25 | End: 2018-05-10

## 2018-04-25 RX ORDER — DEXTROSE 50 % IN WATER 50 %
1 SYRINGE (ML) INTRAVENOUS ONCE
Qty: 0 | Refills: 0 | Status: DISCONTINUED | OUTPATIENT
Start: 2018-04-25 | End: 2018-05-10

## 2018-04-25 RX ORDER — INSULIN LISPRO 100/ML
5 VIAL (ML) SUBCUTANEOUS ONCE
Qty: 0 | Refills: 0 | Status: COMPLETED | OUTPATIENT
Start: 2018-04-25 | End: 2018-04-25

## 2018-04-25 RX ORDER — INSULIN LISPRO 100/ML
VIAL (ML) SUBCUTANEOUS
Qty: 0 | Refills: 0 | Status: DISCONTINUED | OUTPATIENT
Start: 2018-04-25 | End: 2018-05-10

## 2018-04-25 RX ORDER — DEXTROSE 50 % IN WATER 50 %
25 SYRINGE (ML) INTRAVENOUS ONCE
Qty: 0 | Refills: 0 | Status: DISCONTINUED | OUTPATIENT
Start: 2018-04-25 | End: 2018-05-10

## 2018-04-25 RX ORDER — DEXTROSE 50 % IN WATER 50 %
12.5 SYRINGE (ML) INTRAVENOUS ONCE
Qty: 0 | Refills: 0 | Status: DISCONTINUED | OUTPATIENT
Start: 2018-04-25 | End: 2018-05-10

## 2018-04-25 RX ORDER — SODIUM CHLORIDE 9 MG/ML
1000 INJECTION, SOLUTION INTRAVENOUS
Qty: 0 | Refills: 0 | Status: DISCONTINUED | OUTPATIENT
Start: 2018-04-25 | End: 2018-05-10

## 2018-04-25 RX ORDER — GLUCAGON INJECTION, SOLUTION 0.5 MG/.1ML
1 INJECTION, SOLUTION SUBCUTANEOUS ONCE
Qty: 0 | Refills: 0 | Status: DISCONTINUED | OUTPATIENT
Start: 2018-04-25 | End: 2018-05-10

## 2018-04-25 RX ADMIN — Medication 3: at 14:24

## 2018-04-25 RX ADMIN — SODIUM CHLORIDE 3 MILLILITER(S): 9 INJECTION INTRAMUSCULAR; INTRAVENOUS; SUBCUTANEOUS at 13:56

## 2018-04-25 RX ADMIN — Medication 5 UNIT(S): at 13:42

## 2018-04-25 NOTE — H&P CARDIOLOGY - PMH
Diabetes mellitus    Hyperlipidemia    Hypertension    Seasonal allergies    Smoking    Wegener's granulomatosis  throat, treated surgically in 4/2017, presently on Methotrexate

## 2018-04-25 NOTE — H&P CARDIOLOGY - HISTORY OF PRESENT ILLNESS
42 y.o. male presents today for elective cardiac catheterization. The patient cliams that during a routine visit to his PMD he was found to hve an abrnomal ECG, was referred to a cardioogsit, had stress test and echo. The patient c/o episodes of midsternal chest pain on and off, aggrvate by itself, not associate with exeriton, lasts a few seconds, doesnt radiate. resolve by itsefl. Denies SOB, palpitations, dizziness, b/l lower extremities edema, pre syncope, syncope. 42 y.o. male presents today for elective cardiac catheterization. The patient cliams that during a routine visit to his PMD he was found to have abrnomal ECG, was referred to a cardiologist, had stress test (report is not available) and abnormal echo. The patient c/o episodes of midsternal chest pain on and off, aggravate by itself, not associate with exertion lasts for a few seconds, doesn't radiate, resolve by itself. The patient denies SOB, palpitations, dizziness, b/l lower extremities edema, pre syncope, syncope, melena, hematochezia, fever, chills, abdominal pain, N/v/D/C, urinary symptoms. Due to patient's symptoms and abnormal non invasive tests, the patient was recommended to have cardiac cath. The patient denies any complaints at present.     Echo 4/11/2018 - Dialte LV size with reduced function EF 20-25%. RV normal size and function. Mild LA enlargement.

## 2018-04-25 NOTE — CHART NOTE - NSCHARTNOTEFT_GEN_A_CORE
The patient was noted to have HgbA1c - 9.3. As per patient, his  HgbA1c is improved, it was 11.0 in 2/2018. He was offered to have endocrinology consult now, but he declined, since he has an endocrinologist and will f/u with him within this or next week. Low carbohydrate diet was explained.

## 2018-04-25 NOTE — H&P CARDIOLOGY - PSH
History of rectal abscess  s/p surgery  History of vasectomy    Wegener's granulomatosis  treated surgically in 4/2017, presently  is on Methotrexate

## 2018-04-27 ENCOUNTER — APPOINTMENT (OUTPATIENT)
Dept: RHEUMATOLOGY | Facility: CLINIC | Age: 43
End: 2018-04-27
Payer: COMMERCIAL

## 2018-04-27 VITALS
HEIGHT: 74 IN | SYSTOLIC BLOOD PRESSURE: 144 MMHG | DIASTOLIC BLOOD PRESSURE: 96 MMHG | OXYGEN SATURATION: 98 % | TEMPERATURE: 98.5 F | BODY MASS INDEX: 32.73 KG/M2 | WEIGHT: 255 LBS | HEART RATE: 81 BPM

## 2018-04-27 LAB
ALBUMIN SERPL ELPH-MCNC: 4.2 G/DL
ALP BLD-CCNC: 43 U/L
ALT SERPL-CCNC: 27 U/L
ANION GAP SERPL CALC-SCNC: 14 MMOL/L
APPEARANCE: CLEAR
AST SERPL-CCNC: 15 U/L
BASOPHILS # BLD AUTO: 0.02 K/UL
BASOPHILS NFR BLD AUTO: 0.3 %
BILIRUB SERPL-MCNC: 0.4 MG/DL
BILIRUBIN URINE: NEGATIVE
BLOOD URINE: NEGATIVE
BUN SERPL-MCNC: 15 MG/DL
CALCIUM SERPL-MCNC: 9.3 MG/DL
CHLORIDE SERPL-SCNC: 103 MMOL/L
CO2 SERPL-SCNC: 24 MMOL/L
COLOR: YELLOW
CREAT SERPL-MCNC: 1.14 MG/DL
CRP SERPL-MCNC: 0.3 MG/DL
EOSINOPHIL # BLD AUTO: 0.14 K/UL
EOSINOPHIL NFR BLD AUTO: 2.1 %
ERYTHROCYTE [SEDIMENTATION RATE] IN BLOOD BY WESTERGREN METHOD: 13 MM/HR
GGT SERPL-CCNC: 44 U/L
GLUCOSE QUALITATIVE U: 100 MG/DL
GLUCOSE SERPL-MCNC: 219 MG/DL
HCT VFR BLD CALC: 42.3 %
HGB BLD-MCNC: 13.7 G/DL
IMM GRANULOCYTES NFR BLD AUTO: 0.1 %
KETONES URINE: NEGATIVE
LEUKOCYTE ESTERASE URINE: NEGATIVE
LYMPHOCYTES # BLD AUTO: 3.21 K/UL
LYMPHOCYTES NFR BLD AUTO: 47.5 %
MAN DIFF?: NORMAL
MCHC RBC-ENTMCNC: 30.9 PG
MCHC RBC-ENTMCNC: 32.4 GM/DL
MCV RBC AUTO: 95.5 FL
MONOCYTES # BLD AUTO: 0.51 K/UL
MONOCYTES NFR BLD AUTO: 7.5 %
NEUTROPHILS # BLD AUTO: 2.87 K/UL
NEUTROPHILS NFR BLD AUTO: 42.5 %
NITRITE URINE: NEGATIVE
PH URINE: 6
PLATELET # BLD AUTO: 245 K/UL
POTASSIUM SERPL-SCNC: 4.7 MMOL/L
PROT SERPL-MCNC: 6.6 G/DL
PROTEIN URINE: NEGATIVE MG/DL
RBC # BLD: 4.43 M/UL
RBC # FLD: 13.5 %
SODIUM SERPL-SCNC: 141 MMOL/L
SPECIFIC GRAVITY URINE: 1.02
UROBILINOGEN URINE: NEGATIVE MG/DL
WBC # FLD AUTO: 6.76 K/UL

## 2018-04-27 PROCEDURE — 99214 OFFICE O/P EST MOD 30 MIN: CPT

## 2018-04-27 RX ORDER — PREDNISONE, DIPHENHYDRAMINE
3 X 50 MG & KIT AS DIRECTED
Qty: 1 | Refills: 0 | Status: DISCONTINUED | COMMUNITY
Start: 2018-04-19 | End: 2018-04-27

## 2018-04-27 RX ORDER — AMOXICILLIN 875 MG/1
875 TABLET, FILM COATED ORAL
Qty: 14 | Refills: 0 | Status: DISCONTINUED | COMMUNITY
Start: 2017-12-27 | End: 2018-04-27

## 2018-04-27 RX ORDER — PREDNISONE 10 MG/1
10 TABLET ORAL DAILY
Qty: 1 | Refills: 0 | Status: DISCONTINUED | COMMUNITY
Start: 2018-01-18 | End: 2018-04-27

## 2018-05-10 ENCOUNTER — APPOINTMENT (OUTPATIENT)
Dept: OTOLARYNGOLOGY | Facility: CLINIC | Age: 43
End: 2018-05-10

## 2018-05-31 ENCOUNTER — TRANSCRIPTION ENCOUNTER (OUTPATIENT)
Age: 43
End: 2018-05-31

## 2018-05-31 ENCOUNTER — APPOINTMENT (OUTPATIENT)
Dept: OTOLARYNGOLOGY | Facility: CLINIC | Age: 43
End: 2018-05-31
Payer: COMMERCIAL

## 2018-05-31 PROCEDURE — 31579 LARYNGOSCOPY TELESCOPIC: CPT

## 2018-05-31 PROCEDURE — 99214 OFFICE O/P EST MOD 30 MIN: CPT | Mod: 25

## 2018-06-20 ENCOUNTER — OUTPATIENT (OUTPATIENT)
Dept: OUTPATIENT SERVICES | Facility: HOSPITAL | Age: 43
LOS: 1 days | End: 2018-06-20
Payer: COMMERCIAL

## 2018-06-20 VITALS
DIASTOLIC BLOOD PRESSURE: 94 MMHG | WEIGHT: 255.07 LBS | TEMPERATURE: 98 F | HEART RATE: 79 BPM | RESPIRATION RATE: 16 BRPM | SYSTOLIC BLOOD PRESSURE: 134 MMHG | HEIGHT: 75 IN

## 2018-06-20 DIAGNOSIS — Z87.19 PERSONAL HISTORY OF OTHER DISEASES OF THE DIGESTIVE SYSTEM: Chronic | ICD-10-CM

## 2018-06-20 DIAGNOSIS — J38.7 OTHER DISEASES OF LARYNX: ICD-10-CM

## 2018-06-20 DIAGNOSIS — E11.9 TYPE 2 DIABETES MELLITUS WITHOUT COMPLICATIONS: ICD-10-CM

## 2018-06-20 DIAGNOSIS — I10 ESSENTIAL (PRIMARY) HYPERTENSION: ICD-10-CM

## 2018-06-20 DIAGNOSIS — M31.30 WEGENER'S GRANULOMATOSIS WITHOUT RENAL INVOLVEMENT: Chronic | ICD-10-CM

## 2018-06-20 DIAGNOSIS — M31.30 WEGENER'S GRANULOMATOSIS WITHOUT RENAL INVOLVEMENT: ICD-10-CM

## 2018-06-20 LAB
ALBUMIN SERPL ELPH-MCNC: 4.2 G/DL — SIGNIFICANT CHANGE UP (ref 3.3–5)
ALP SERPL-CCNC: 48 U/L — SIGNIFICANT CHANGE UP (ref 40–120)
ALT FLD-CCNC: 21 U/L — SIGNIFICANT CHANGE UP (ref 4–41)
AST SERPL-CCNC: 15 U/L — SIGNIFICANT CHANGE UP (ref 4–40)
BILIRUB SERPL-MCNC: 0.4 MG/DL — SIGNIFICANT CHANGE UP (ref 0.2–1.2)
BUN SERPL-MCNC: 16 MG/DL — SIGNIFICANT CHANGE UP (ref 7–23)
CALCIUM SERPL-MCNC: 9.6 MG/DL — SIGNIFICANT CHANGE UP (ref 8.4–10.5)
CHLORIDE SERPL-SCNC: 102 MMOL/L — SIGNIFICANT CHANGE UP (ref 98–107)
CO2 SERPL-SCNC: 25 MMOL/L — SIGNIFICANT CHANGE UP (ref 22–31)
CREAT SERPL-MCNC: 1.05 MG/DL — SIGNIFICANT CHANGE UP (ref 0.5–1.3)
GLUCOSE SERPL-MCNC: 94 MG/DL — SIGNIFICANT CHANGE UP (ref 70–99)
HBA1C BLD-MCNC: 10 % — HIGH (ref 4–5.6)
HCT VFR BLD CALC: 41.4 % — SIGNIFICANT CHANGE UP (ref 39–50)
HGB BLD-MCNC: 13.5 G/DL — SIGNIFICANT CHANGE UP (ref 13–17)
MCHC RBC-ENTMCNC: 30.8 PG — SIGNIFICANT CHANGE UP (ref 27–34)
MCHC RBC-ENTMCNC: 32.6 % — SIGNIFICANT CHANGE UP (ref 32–36)
MCV RBC AUTO: 94.3 FL — SIGNIFICANT CHANGE UP (ref 80–100)
NRBC # FLD: 0 — SIGNIFICANT CHANGE UP
PLATELET # BLD AUTO: 257 K/UL — SIGNIFICANT CHANGE UP (ref 150–400)
PMV BLD: 10.3 FL — SIGNIFICANT CHANGE UP (ref 7–13)
POTASSIUM SERPL-MCNC: 3.8 MMOL/L — SIGNIFICANT CHANGE UP (ref 3.5–5.3)
POTASSIUM SERPL-SCNC: 3.8 MMOL/L — SIGNIFICANT CHANGE UP (ref 3.5–5.3)
PROT SERPL-MCNC: 7.4 G/DL — SIGNIFICANT CHANGE UP (ref 6–8.3)
RBC # BLD: 4.39 M/UL — SIGNIFICANT CHANGE UP (ref 4.2–5.8)
RBC # FLD: 13.6 % — SIGNIFICANT CHANGE UP (ref 10.3–14.5)
SODIUM SERPL-SCNC: 140 MMOL/L — SIGNIFICANT CHANGE UP (ref 135–145)
WBC # BLD: 6.93 K/UL — SIGNIFICANT CHANGE UP (ref 3.8–10.5)
WBC # FLD AUTO: 6.93 K/UL — SIGNIFICANT CHANGE UP (ref 3.8–10.5)

## 2018-06-20 PROCEDURE — 93010 ELECTROCARDIOGRAM REPORT: CPT

## 2018-06-20 RX ORDER — INSULIN GLARGINE 100 [IU]/ML
10 INJECTION, SOLUTION SUBCUTANEOUS
Qty: 0 | Refills: 0 | COMMUNITY

## 2018-06-20 RX ORDER — METOPROLOL TARTRATE 50 MG
1 TABLET ORAL
Qty: 0 | Refills: 0 | COMMUNITY

## 2018-06-20 RX ORDER — SODIUM CHLORIDE 9 MG/ML
1000 INJECTION, SOLUTION INTRAVENOUS
Qty: 0 | Refills: 0 | Status: DISCONTINUED | OUTPATIENT
Start: 2018-06-25 | End: 2018-06-26

## 2018-06-20 RX ORDER — INSULIN GLARGINE 100 [IU]/ML
15 INJECTION, SOLUTION SUBCUTANEOUS
Qty: 0 | Refills: 0 | COMMUNITY

## 2018-06-20 RX ORDER — METFORMIN HYDROCHLORIDE 850 MG/1
1 TABLET ORAL
Qty: 0 | Refills: 0 | COMMUNITY

## 2018-06-20 NOTE — H&P PST ADULT - PROBLEM SELECTOR PLAN 2
Pt to take Lantus 20 u SC 6/24/18, take last dose of Glipizide 6/24/18 am dose and to take last dose of Metformin 6/24./18 am dose.

## 2018-06-20 NOTE — H&P PST ADULT - PMH
Diabetes mellitus    Hyperlipidemia    Hypertension    Seasonal allergies    Smoking    Wegener's granulomatosis  throat, treated surgically in 4/2017, presently on Methotrexate CAD (coronary artery disease)    Diabetes mellitus    Hyperlipidemia    Hypertension    Obesity    Seasonal allergies    Smoking    Wegener's granulomatosis  throat, treated surgically in 4/2017, presently on Methotrexate

## 2018-06-20 NOTE — H&P PST ADULT - NEGATIVE NEUROLOGICAL SYMPTOMS
no syncope/no focal seizures/no weakness/no transient paralysis/no generalized seizures/no paresthesias

## 2018-06-20 NOTE — H&P PST ADULT - PROBLEM SELECTOR PLAN 1
Pt given pre-op instructions and Famotidine and verbalized understanding  OR Booking notified of MAR precautions and DM / Insulin   Cardiac Cath report and comparison EKG printed  ASA last dose 6/20/18

## 2018-06-20 NOTE — H&P PST ADULT - HISTORY OF PRESENT ILLNESS
41 yr old male with PMH HTN, GERD, HLD, DM type 2presents to PST h/o hoarseness of voice since 9/2016, ENT evaluation with pre op dx: other disease of Larynx for pre surgical evaluation and scheduled for Microdirect laryngoscopy with excision right vocal cord tumor on 4/26/2017 41 yr old male with PMH HTN, GERD, HLD, DM type 2presents to PST h/o hoarseness of voice since 9/2016, ENT evaluation noted Wegeners granulomatosis and patient has had this surgery 4/17. Now to have surgery repeated r/t increased hoarseness. Pt denies pain or dysphagia

## 2018-06-24 ENCOUNTER — TRANSCRIPTION ENCOUNTER (OUTPATIENT)
Age: 43
End: 2018-06-24

## 2018-06-25 ENCOUNTER — RESULT REVIEW (OUTPATIENT)
Age: 43
End: 2018-06-25

## 2018-06-25 ENCOUNTER — INPATIENT (INPATIENT)
Facility: HOSPITAL | Age: 43
LOS: 0 days | Discharge: HOME | End: 2018-06-26
Attending: OTOLARYNGOLOGY | Admitting: OTOLARYNGOLOGY
Payer: COMMERCIAL

## 2018-06-25 ENCOUNTER — APPOINTMENT (OUTPATIENT)
Dept: OTOLARYNGOLOGY | Facility: HOSPITAL | Age: 43
End: 2018-06-25

## 2018-06-25 VITALS
WEIGHT: 255.07 LBS | DIASTOLIC BLOOD PRESSURE: 87 MMHG | HEIGHT: 75 IN | TEMPERATURE: 99 F | RESPIRATION RATE: 17 BRPM | SYSTOLIC BLOOD PRESSURE: 140 MMHG | OXYGEN SATURATION: 98 % | HEART RATE: 85 BPM

## 2018-06-25 DIAGNOSIS — J38.7 OTHER DISEASES OF LARYNX: ICD-10-CM

## 2018-06-25 DIAGNOSIS — M31.30 WEGENER'S GRANULOMATOSIS WITHOUT RENAL INVOLVEMENT: Chronic | ICD-10-CM

## 2018-06-25 DIAGNOSIS — Z87.19 PERSONAL HISTORY OF OTHER DISEASES OF THE DIGESTIVE SYSTEM: Chronic | ICD-10-CM

## 2018-06-25 PROCEDURE — 88342 IMHCHEM/IMCYTCHM 1ST ANTB: CPT | Mod: 26

## 2018-06-25 PROCEDURE — 31571 LARYNGOSCOP W/VC INJ + SCOPE: CPT

## 2018-06-25 PROCEDURE — 88188 FLOWCYTOMETRY/READ 9-15: CPT

## 2018-06-25 PROCEDURE — 88341 IMHCHEM/IMCYTCHM EA ADD ANTB: CPT | Mod: 26

## 2018-06-25 PROCEDURE — 31545 REMOVE VC LESION W/SCOPE: CPT | Mod: RT

## 2018-06-25 PROCEDURE — 88305 TISSUE EXAM BY PATHOLOGIST: CPT | Mod: 26

## 2018-06-25 RX ORDER — DEXAMETHASONE 0.5 MG/5ML
10 ELIXIR ORAL ONCE
Qty: 0 | Refills: 0 | Status: COMPLETED | OUTPATIENT
Start: 2018-06-26 | End: 2018-06-26

## 2018-06-25 RX ORDER — SODIUM CHLORIDE 9 MG/ML
1000 INJECTION, SOLUTION INTRAVENOUS
Qty: 0 | Refills: 0 | Status: DISCONTINUED | OUTPATIENT
Start: 2018-06-25 | End: 2018-06-26

## 2018-06-25 RX ORDER — DEXTROSE 50 % IN WATER 50 %
25 SYRINGE (ML) INTRAVENOUS ONCE
Qty: 0 | Refills: 0 | Status: DISCONTINUED | OUTPATIENT
Start: 2018-06-25 | End: 2018-06-26

## 2018-06-25 RX ORDER — INSULIN LISPRO 100/ML
VIAL (ML) SUBCUTANEOUS AT BEDTIME
Qty: 0 | Refills: 0 | Status: DISCONTINUED | OUTPATIENT
Start: 2018-06-25 | End: 2018-06-26

## 2018-06-25 RX ORDER — INSULIN LISPRO 100/ML
VIAL (ML) SUBCUTANEOUS
Qty: 0 | Refills: 0 | Status: DISCONTINUED | OUTPATIENT
Start: 2018-06-25 | End: 2018-06-26

## 2018-06-25 RX ORDER — OXYCODONE AND ACETAMINOPHEN 5; 325 MG/1; MG/1
2 TABLET ORAL EVERY 6 HOURS
Qty: 0 | Refills: 0 | Status: DISCONTINUED | OUTPATIENT
Start: 2018-06-25 | End: 2018-06-26

## 2018-06-25 RX ORDER — DEXTROSE 50 % IN WATER 50 %
12.5 SYRINGE (ML) INTRAVENOUS ONCE
Qty: 0 | Refills: 0 | Status: DISCONTINUED | OUTPATIENT
Start: 2018-06-25 | End: 2018-06-26

## 2018-06-25 RX ORDER — ACETAMINOPHEN 500 MG
650 TABLET ORAL EVERY 6 HOURS
Qty: 0 | Refills: 0 | Status: DISCONTINUED | OUTPATIENT
Start: 2018-06-25 | End: 2018-06-26

## 2018-06-25 RX ORDER — FENTANYL CITRATE 50 UG/ML
25 INJECTION INTRAVENOUS
Qty: 0 | Refills: 0 | Status: DISCONTINUED | OUTPATIENT
Start: 2018-06-25 | End: 2018-06-25

## 2018-06-25 RX ORDER — FENTANYL CITRATE 50 UG/ML
50 INJECTION INTRAVENOUS
Qty: 0 | Refills: 0 | Status: DISCONTINUED | OUTPATIENT
Start: 2018-06-25 | End: 2018-06-25

## 2018-06-25 RX ORDER — METOPROLOL TARTRATE 50 MG
50 TABLET ORAL DAILY
Qty: 0 | Refills: 0 | Status: DISCONTINUED | OUTPATIENT
Start: 2018-06-25 | End: 2018-06-26

## 2018-06-25 RX ORDER — OXYCODONE AND ACETAMINOPHEN 5; 325 MG/1; MG/1
1 TABLET ORAL EVERY 4 HOURS
Qty: 0 | Refills: 0 | Status: DISCONTINUED | OUTPATIENT
Start: 2018-06-25 | End: 2018-06-26

## 2018-06-25 RX ORDER — DEXTROSE 50 % IN WATER 50 %
15 SYRINGE (ML) INTRAVENOUS ONCE
Qty: 0 | Refills: 0 | Status: DISCONTINUED | OUTPATIENT
Start: 2018-06-25 | End: 2018-06-26

## 2018-06-25 RX ORDER — INSULIN GLARGINE 100 [IU]/ML
25 INJECTION, SOLUTION SUBCUTANEOUS AT BEDTIME
Qty: 0 | Refills: 0 | Status: DISCONTINUED | OUTPATIENT
Start: 2018-06-25 | End: 2018-06-26

## 2018-06-25 RX ORDER — METOPROLOL TARTRATE 50 MG
50 TABLET ORAL DAILY
Qty: 0 | Refills: 0 | Status: DISCONTINUED | OUTPATIENT
Start: 2018-06-25 | End: 2018-06-25

## 2018-06-25 RX ORDER — GLUCAGON INJECTION, SOLUTION 0.5 MG/.1ML
1 INJECTION, SOLUTION SUBCUTANEOUS ONCE
Qty: 0 | Refills: 0 | Status: DISCONTINUED | OUTPATIENT
Start: 2018-06-25 | End: 2018-06-26

## 2018-06-25 RX ORDER — ATORVASTATIN CALCIUM 80 MG/1
40 TABLET, FILM COATED ORAL AT BEDTIME
Qty: 0 | Refills: 0 | Status: DISCONTINUED | OUTPATIENT
Start: 2018-06-25 | End: 2018-06-26

## 2018-06-25 RX ORDER — ONDANSETRON 8 MG/1
4 TABLET, FILM COATED ORAL ONCE
Qty: 0 | Refills: 0 | Status: DISCONTINUED | OUTPATIENT
Start: 2018-06-25 | End: 2018-06-25

## 2018-06-25 RX ADMIN — INSULIN GLARGINE 25 UNIT(S): 100 INJECTION, SOLUTION SUBCUTANEOUS at 22:55

## 2018-06-25 RX ADMIN — OXYCODONE AND ACETAMINOPHEN 2 TABLET(S): 5; 325 TABLET ORAL at 23:35

## 2018-06-25 RX ADMIN — ATORVASTATIN CALCIUM 40 MILLIGRAM(S): 80 TABLET, FILM COATED ORAL at 22:55

## 2018-06-25 RX ADMIN — OXYCODONE AND ACETAMINOPHEN 2 TABLET(S): 5; 325 TABLET ORAL at 23:01

## 2018-06-25 RX ADMIN — SODIUM CHLORIDE 25 MILLILITER(S): 9 INJECTION, SOLUTION INTRAVENOUS at 14:41

## 2018-06-26 ENCOUNTER — TRANSCRIPTION ENCOUNTER (OUTPATIENT)
Age: 43
End: 2018-06-26

## 2018-06-26 VITALS
OXYGEN SATURATION: 100 % | HEART RATE: 83 BPM | RESPIRATION RATE: 18 BRPM | SYSTOLIC BLOOD PRESSURE: 146 MMHG | TEMPERATURE: 98 F | DIASTOLIC BLOOD PRESSURE: 91 MMHG

## 2018-06-26 PROCEDURE — 99239 HOSP IP/OBS DSCHRG MGMT >30: CPT

## 2018-06-26 PROCEDURE — 99223 1ST HOSP IP/OBS HIGH 75: CPT

## 2018-06-26 RX ADMIN — Medication 102 MILLIGRAM(S): at 03:34

## 2018-06-26 RX ADMIN — Medication 50 MILLIGRAM(S): at 05:29

## 2018-06-26 RX ADMIN — OXYCODONE AND ACETAMINOPHEN 1 TABLET(S): 5; 325 TABLET ORAL at 04:12

## 2018-06-26 RX ADMIN — Medication 2: at 09:12

## 2018-06-26 RX ADMIN — OXYCODONE AND ACETAMINOPHEN 1 TABLET(S): 5; 325 TABLET ORAL at 03:32

## 2018-06-26 NOTE — DISCHARGE NOTE ADULT - LEARNING BEHAVIORAL ACTIVITIES TO COPE WITH URGES. FOR EXAMPLE, DISTRACTION AND CHANGING ROUTINES.
alert oriented x 3 no distress  s/p witnessed seizure  (multiple)  no injury  prior to seizure pt states he fell in [place] (unwitnessed)  denies LOC or injury  then had witnessed fall on to carpet no injury  parents state no seizure hx  right hand 20g heplock inserted by EMS Statement Selected

## 2018-06-26 NOTE — DISCHARGE NOTE ADULT - INSTRUCTIONS
Watch for signs of infection; redness, swelling, fever, chills or heat, report such symptoms to the MD. Notify MD with difficulty swallowing or swelling. No driving while taking pain medication, it causes drowsiness & constipation. Drink 6-8 glasses of fluids daily to promote hydration. No heavy lifting, pulling or pushing heavy objects. Follow up with primary & surgical MD. Watch for signs of infection; redness, swelling, fever, chills or heat, report such symptoms to the MD. Notify MD with difficulty swallowing or swelling. No driving while taking pain medication, it causes drowsiness & constipation. Drink 6-8 glasses of fluids daily to promote hydration. No heavy lifting, pulling or pushing heavy objects. Follow up with primary & surgical MD. Follow up with PMD with glucose & diabetic meds.

## 2018-06-26 NOTE — DISCHARGE NOTE ADULT - CARE PROVIDER_API CALL
Olvin Onofre (MD; PhD), Otolaryngology  [facility]  Dept of Otolaryngology  [address] [place] [id number]  Phone: [phone]  Fax: [phone]

## 2018-06-26 NOTE — DISCHARGE NOTE ADULT - PATIENT PORTAL LINK FT
You can access the [facility] Patient Portal, offered by [facility], by registering with the following website: [email or web address]

## 2018-06-26 NOTE — PROGRESS NOTE ADULT - SUBJECTIVE AND OBJECTIVE BOX
Pt seen and examined at bedside. No acute events overnight.     NAD, awake   Breathing comfortably on RA   No stridor  Voice hoarse   Neck soft/flat     A/P: pod1 s/p excision R vocal fold lesion   -pain control prn   -diet as tolerating   -home meds   -diabetes mgmt consult today   -FSG control   -oob ad jose f   -s/p decadron x1 overnight   -dispo: pending

## 2018-06-26 NOTE — DISCHARGE NOTE ADULT - CARE PLAN
Principal Discharge DX:	Vocal cord mass  Goal:	direct laryngoscopy and biopsy  Assessment and plan of treatment:	same

## 2018-06-26 NOTE — CONSULT NOTE ADULT - SUBJECTIVE AND OBJECTIVE BOX
HPI:    42 yr old male with PMH HTN, GERD, HLD, IDDM s/p OR yesterday for excision R vocal fold lesion (has Wegener's involving the throat)  Per ENT, ok for d/c home today  Still w/ hoarseness  Pt states that he has an endocrinologist, next appointment is in late July.  States he checks his FS once daily in the AM, usually runs between 100-150.         PAST MEDICAL & SURGICAL HISTORY:  CAD (coronary artery disease)  Obesity  Wegener's granulomatosis: throat, treated surgically in 4/2017, presently on Methotrexate  Smoking  Seasonal allergies  Diabetes mellitus  Hypertension  Hyperlipidemia  Wegener's granulomatosis: treated surgically in 4/2017, presently  is on Methotrexate  History of rectal abscess: s/p surgery  History of vasectomy      Review of Systems:   CONSTITUTIONAL: No fever, weight loss, or fatigue  EYES: No eye pain, visual disturbances, or discharge  ENMT:  No difficulty hearing, tinnitus, vertigo; No sinus or throat pain  NECK: No pain or stiffness  RESPIRATORY: No cough, wheezing, chills or hemoptysis; No shortness of breath  CARDIOVASCULAR: No chest pain, palpitations, dizziness, or leg swelling  GASTROINTESTINAL: No abdominal or epigastric pain. No nausea, vomiting, or hematemesis; No diarrhea or constipation. No melena or hematochezia.  GENITOURINARY: No dysuria, frequency, hematuria, or incontinence  NEUROLOGICAL: No headaches, memory loss, loss of strength, numbness, or tremors  SKIN: No itching, burning, rashes, or lesions   ENDOCRINE: No heat or cold intolerance; No hair loss  MUSCULOSKELETAL: No joint pain or swelling; No muscle, back, or extremity pain      Allergies    IV Contrast (Hives)  Januvia (Hives)    Intolerances        Social History:     former smoker  6 pack year smoking history, quit in 2016     FAMILY HISTORY:  Hypertension      MEDICATIONS  (STANDING):  atorvastatin 40 milliGRAM(s) Oral at bedtime  dextrose 5%. 1000 milliLiter(s) (50 mL/Hr) IV Continuous <Continuous>  dextrose 50% Injectable 12.5 Gram(s) IV Push once  dextrose 50% Injectable 25 Gram(s) IV Push once  dextrose 50% Injectable 25 Gram(s) IV Push once  insulin glargine Injectable (LANTUS) 25 Unit(s) SubCutaneous at bedtime  insulin lispro (HumaLOG) corrective regimen sliding scale   SubCutaneous three times a day before meals  insulin lispro (HumaLOG) corrective regimen sliding scale   SubCutaneous at bedtime  metoprolol succinate ER 50 milliGRAM(s) Oral daily    MEDICATIONS  (PRN):  acetaminophen   Tablet. 650 milliGRAM(s) Oral every 6 hours PRN Mild Pain (1 - 3)  dextrose 40% Gel 15 Gram(s) Oral once PRN Blood Glucose LESS THAN 70 milliGRAM(s)/deciliter  glucagon  Injectable 1 milliGRAM(s) IntraMuscular once PRN Glucose LESS THAN 70 milligrams/deciliter  oxyCODONE    5 mG/acetaminophen 325 mG 1 Tablet(s) Oral every 4 hours PRN Moderate Pain  oxyCODONE    5 mG/acetaminophen 325 mG 2 Tablet(s) Oral every 6 hours PRN Severe Pain (7 - 10)        CAPILLARY BLOOD GLUCOSE      POCT Blood Glucose.: 219 mg/dL (26 Jun 2018 09:09)  POCT Blood Glucose.: 147 mg/dL (25 Jun 2018 14:37)    I&O's Summary    25 Jun 2018 07:01  -  26 Jun 2018 07:00  --------------------------------------------------------  IN: 1195 mL / OUT: 2015 mL / NET: -820 mL    26 Jun 2018 07:01  -  26 Jun 2018 14:24  --------------------------------------------------------  IN: 240 mL / OUT: 800 mL / NET: -560 mL        PHYSICAL EXAM:  GENERAL: NAD, well-developed  HEAD:  Atraumatic, Normocephalic  EYES: EOMI, PERRLA, conjunctiva and sclera clear  NECK: Supple, No JVD  CHEST/LUNG: Clear to auscultation bilaterally; No wheeze  HEART: Regular rate and rhythm; No murmurs, rubs, or gallops  ABDOMEN: Soft, Nontender, Nondistended; Bowel sounds present  EXTREMITIES:  2+ Peripheral Pulses, No clubbing, cyanosis, or edema  NEUROLOGY: non-focal  SKIN: No rashes or lesions    LABS:                    RADIOLOGY & ADDITIONAL TESTS:    Imaging Personally Reviewed:    Consultant(s) Notes Reviewed:  ENT     Care Discussed with Consultants/Other Providers: ENT

## 2018-06-26 NOTE — CONSULT NOTE ADULT - ASSESSMENT
42 yr old male with PMH HTN, GERD, HLD, IDDM, Wegener's s/p OR yesterday for excision R vocal fold lesion    #IDDM - pt takes Lantus 25 units QHS, metformin 1000 BID, glipizide 10 BID. Hga1c 10.0, shows poor glycemic control. Pt states he has appointment with his endocrinologist in late July and will call for an earlier appointment. Pt states he checks his fingersticks once daily in the morning, and it is usually in the 100s at home. Pt instructed to continue to check his FS regularly and f/u Endo/PCP asap     #HTN - BP controlled on metoprolol    #HLD - c/w statin

## 2018-06-26 NOTE — DISCHARGE NOTE ADULT - MEDICATION SUMMARY - MEDICATIONS TO TAKE
I will START or STAY ON the medications listed below when I get home from the hospital:    Aspirin Enteric Coated 81 mg oral delayed release tablet  -- 1 tab(s) by mouth once a day  -- Indication: For supplement    oxyCODONE-acetaminophen 5 mg-325 mg oral tablet  -- 1 tab(s) by mouth every 4 hours, As needed, Moderate Pain MDD:4  -- Indication: For pain    Lantus 100 units/mL subcutaneous solution  -- 25 ur SC HS  -- Indication: For DM    metFORMIN 1000 mg oral tablet  -- 1 tab(s) by mouth 2 times a day  -- Indication: For DM    glipiZIDE 10 mg oral tablet  -- 1 tab BID  -- Indication: For DM    atorvastatin 40 mg oral tablet  -- 1 tab(s) by mouth once a day  -- Indication: For cholesterol    methotrexate 2.5 mg oral tablet  -- 8 tab(s) by mouth once a week Mondays  -- Indication: For Outpt med    metoprolol succinate 50 mg oral tablet, extended release  -- 1 tab(s) by mouth once a day  -- Indication: For HTN    folic acid 1 mg oral tablet  -- 1 tab(s) by mouth once a day  -- Indication: For supplement I will START or STAY ON the medications listed below when I get home from the hospital:    Medrol 2 mg oral tablet  -- 10 tab(s) by mouth once a day x 1 day   then 5 tabs once a day x 1 day  then 2 tabs once a day x 1 day  then one tab once a day  then stop.  -- It is very important that you take or use this exactly as directed.  Do not skip doses or discontinue unless directed by your doctor.  Obtain medical advice before taking any non-prescription drugs as some may affect the action of this medication.  Take with food or milk.    -- Indication: For swelling    Aspirin Enteric Coated 81 mg oral delayed release tablet  -- 1 tab(s) by mouth once a day  -- Indication: For supplement    oxyCODONE-acetaminophen 5 mg-325 mg oral tablet  -- 1 tab(s) by mouth every 4 hours, As needed, Moderate Pain MDD:4  -- Indication: For pain    Lantus 100 units/mL subcutaneous solution  -- 25 ur SC HS  -- Indication: For DM    metFORMIN 1000 mg oral tablet  -- 1 tab(s) by mouth 2 times a day  -- Indication: For DM    glipiZIDE 10 mg oral tablet  -- 1 tab BID  -- Indication: For DM    atorvastatin 40 mg oral tablet  -- 1 tab(s) by mouth once a day  -- Indication: For cholesterol    methotrexate 2.5 mg oral tablet  -- 8 tab(s) by mouth once a week Mondays  -- Indication: For Outpt med    metoprolol succinate 50 mg oral tablet, extended release  -- 1 tab(s) by mouth once a day  -- Indication: For HTN    folic acid 1 mg oral tablet  -- 1 tab(s) by mouth once a day  -- Indication: For supplement

## 2018-06-26 NOTE — DISCHARGE NOTE ADULT - HOSPITAL COURSE
42 year old male with a vocal cord mass. S/P direct laryngoscopy and excision of vocal cord lesion. Discharged home on 6/26/18.

## 2018-06-26 NOTE — DISCHARGE NOTE ADULT - CONDITIONS AT DISCHARGE
Alert & oriented. Out of bed ambulating. Tolerating diet without nausea or vomiting. Voiding without difficulty. Vitals stable, afebrile. Understands all discharge instruction & will follow up with the MD. Time allowed for questions. Alert & oriented. Out of bed ambulating. Tolerating diet without nausea or vomiting. Voiding without difficulty. Vitals stable, afebrile. Monitors his fingersticks & will follow up with PMD with diabetes care. Understands all discharge instruction & will follow up with the MD. Time allowed for questions.

## 2018-06-29 ENCOUNTER — APPOINTMENT (OUTPATIENT)
Dept: RHEUMATOLOGY | Facility: CLINIC | Age: 43
End: 2018-06-29
Payer: COMMERCIAL

## 2018-06-29 VITALS
TEMPERATURE: 98.5 F | BODY MASS INDEX: 32.73 KG/M2 | HEART RATE: 77 BPM | WEIGHT: 255 LBS | SYSTOLIC BLOOD PRESSURE: 138 MMHG | HEIGHT: 74 IN | DIASTOLIC BLOOD PRESSURE: 97 MMHG | OXYGEN SATURATION: 97 %

## 2018-06-29 LAB
ALBUMIN SERPL ELPH-MCNC: 4 G/DL
ALP BLD-CCNC: 43 U/L
ALT SERPL-CCNC: 36 U/L
ANION GAP SERPL CALC-SCNC: 15 MMOL/L
APPEARANCE: CLEAR
AST SERPL-CCNC: 22 U/L
BASOPHILS # BLD AUTO: 0.02 K/UL
BASOPHILS NFR BLD AUTO: 0.2 %
BILIRUB SERPL-MCNC: 0.5 MG/DL
BILIRUBIN URINE: NEGATIVE
BLOOD URINE: NEGATIVE
BUN SERPL-MCNC: 14 MG/DL
CALCIUM SERPL-MCNC: 9.3 MG/DL
CHLORIDE SERPL-SCNC: 101 MMOL/L
CO2 SERPL-SCNC: 27 MMOL/L
COLOR: YELLOW
CREAT SERPL-MCNC: 1.04 MG/DL
CRP SERPL-MCNC: 0.2 MG/DL
EOSINOPHIL # BLD AUTO: 0.13 K/UL
EOSINOPHIL NFR BLD AUTO: 1.5 %
ERYTHROCYTE [SEDIMENTATION RATE] IN BLOOD BY WESTERGREN METHOD: 9 MM/HR
GLUCOSE QUALITATIVE U: NEGATIVE MG/DL
GLUCOSE SERPL-MCNC: 125 MG/DL
HBV CORE IGG+IGM SER QL: NONREACTIVE
HBV SURFACE AB SER QL: NONREACTIVE
HBV SURFACE AG SER QL: NONREACTIVE
HCT VFR BLD CALC: 42 %
HCV AB SER QL: NONREACTIVE
HCV S/CO RATIO: 0.06 S/CO
HGB BLD-MCNC: 13.4 G/DL
IMM GRANULOCYTES NFR BLD AUTO: 0.2 %
KETONES URINE: NEGATIVE
LEUKOCYTE ESTERASE URINE: NEGATIVE
LYMPHOCYTES # BLD AUTO: 3.75 K/UL
LYMPHOCYTES NFR BLD AUTO: 43.4 %
MAN DIFF?: NORMAL
MCHC RBC-ENTMCNC: 30.2 PG
MCHC RBC-ENTMCNC: 31.9 GM/DL
MCV RBC AUTO: 94.8 FL
MONOCYTES # BLD AUTO: 0.87 K/UL
MONOCYTES NFR BLD AUTO: 10.1 %
NEUTROPHILS # BLD AUTO: 3.85 K/UL
NEUTROPHILS NFR BLD AUTO: 44.6 %
NITRITE URINE: NEGATIVE
PH URINE: 7
PLATELET # BLD AUTO: 257 K/UL
POTASSIUM SERPL-SCNC: 4 MMOL/L
PROT SERPL-MCNC: 6.5 G/DL
PROTEIN URINE: NEGATIVE MG/DL
RBC # BLD: 4.43 M/UL
RBC # FLD: 14 %
SODIUM SERPL-SCNC: 143 MMOL/L
SPECIFIC GRAVITY URINE: 1.02
UROBILINOGEN URINE: NEGATIVE MG/DL
WBC # FLD AUTO: 8.64 K/UL

## 2018-06-29 PROCEDURE — 99214 OFFICE O/P EST MOD 30 MIN: CPT

## 2018-06-29 RX ORDER — RANITIDINE 150 MG/1
150 TABLET ORAL
Qty: 180 | Refills: 0 | Status: DISCONTINUED | COMMUNITY
Start: 2017-12-07 | End: 2018-06-29

## 2018-06-30 LAB — 25(OH)D3 SERPL-MCNC: 35.3 NG/ML

## 2018-07-02 LAB
ADJUSTED MITOGEN: >10 IU/ML
ADJUSTED TB AG: 0.02 IU/ML
M TB IFN-G BLD-IMP: NEGATIVE
QUANTIFERON GOLD NIL: 0.04 IU/ML

## 2018-07-05 ENCOUNTER — APPOINTMENT (OUTPATIENT)
Dept: UROLOGY | Facility: CLINIC | Age: 43
End: 2018-07-05

## 2018-07-05 ENCOUNTER — APPOINTMENT (OUTPATIENT)
Dept: OTOLARYNGOLOGY | Facility: CLINIC | Age: 43
End: 2018-07-05
Payer: COMMERCIAL

## 2018-07-05 VITALS — BODY MASS INDEX: 32.73 KG/M2 | HEIGHT: 74 IN | WEIGHT: 255 LBS

## 2018-07-05 PROCEDURE — 99214 OFFICE O/P EST MOD 30 MIN: CPT | Mod: 25

## 2018-07-05 PROCEDURE — 31579 LARYNGOSCOPY TELESCOPIC: CPT

## 2018-07-06 ENCOUNTER — RX RENEWAL (OUTPATIENT)
Age: 43
End: 2018-07-06

## 2018-07-13 ENCOUNTER — APPOINTMENT (OUTPATIENT)
Dept: UROLOGY | Facility: CLINIC | Age: 43
End: 2018-07-13

## 2018-07-25 ENCOUNTER — RX RENEWAL (OUTPATIENT)
Age: 43
End: 2018-07-25

## 2018-07-31 PROBLEM — I25.10 ATHEROSCLEROTIC HEART DISEASE OF NATIVE CORONARY ARTERY WITHOUT ANGINA PECTORIS: Chronic | Status: ACTIVE | Noted: 2018-06-20

## 2018-07-31 PROBLEM — M31.30 WEGENER'S GRANULOMATOSIS WITHOUT RENAL INVOLVEMENT: Chronic | Status: ACTIVE | Noted: 2018-04-25

## 2018-07-31 PROBLEM — E66.9 OBESITY, UNSPECIFIED: Chronic | Status: ACTIVE | Noted: 2018-06-20

## 2018-07-31 PROBLEM — F17.200 NICOTINE DEPENDENCE, UNSPECIFIED, UNCOMPLICATED: Chronic | Status: ACTIVE | Noted: 2017-04-14

## 2018-09-13 ENCOUNTER — APPOINTMENT (OUTPATIENT)
Dept: OTOLARYNGOLOGY | Facility: CLINIC | Age: 43
End: 2018-09-13
Payer: COMMERCIAL

## 2018-09-13 VITALS
SYSTOLIC BLOOD PRESSURE: 147 MMHG | HEIGHT: 74 IN | HEART RATE: 86 BPM | DIASTOLIC BLOOD PRESSURE: 97 MMHG | WEIGHT: 255 LBS | BODY MASS INDEX: 32.73 KG/M2

## 2018-09-13 PROCEDURE — 31579 LARYNGOSCOPY TELESCOPIC: CPT

## 2018-09-13 PROCEDURE — 99214 OFFICE O/P EST MOD 30 MIN: CPT | Mod: 25

## 2018-09-18 ENCOUNTER — APPOINTMENT (OUTPATIENT)
Dept: RHEUMATOLOGY | Facility: CLINIC | Age: 43
End: 2018-09-18
Payer: COMMERCIAL

## 2018-09-18 PROCEDURE — 99213 OFFICE O/P EST LOW 20 MIN: CPT

## 2018-09-18 RX ORDER — ATORVASTATIN CALCIUM 80 MG/1
TABLET, FILM COATED ORAL
Refills: 0 | Status: DISCONTINUED | COMMUNITY
End: 2018-09-18

## 2018-09-18 RX ORDER — METOPROLOL SUCCINATE 50 MG/1
50 TABLET, EXTENDED RELEASE ORAL
Qty: 30 | Refills: 0 | Status: DISCONTINUED | COMMUNITY
Start: 2018-02-07 | End: 2018-09-18

## 2018-09-19 ENCOUNTER — LABORATORY RESULT (OUTPATIENT)
Age: 43
End: 2018-09-19

## 2018-09-19 ENCOUNTER — APPOINTMENT (OUTPATIENT)
Dept: RHEUMATOLOGY | Facility: CLINIC | Age: 43
End: 2018-09-19
Payer: COMMERCIAL

## 2018-09-19 PROCEDURE — 36415 COLL VENOUS BLD VENIPUNCTURE: CPT

## 2018-09-19 PROCEDURE — 96415 CHEMO IV INFUSION ADDL HR: CPT

## 2018-09-19 PROCEDURE — 96375 TX/PRO/DX INJ NEW DRUG ADDON: CPT

## 2018-09-19 PROCEDURE — 96413 CHEMO IV INFUSION 1 HR: CPT

## 2018-09-20 ENCOUNTER — OUTPATIENT (OUTPATIENT)
Dept: OUTPATIENT SERVICES | Facility: HOSPITAL | Age: 43
LOS: 1 days | Discharge: HOME | End: 2018-09-20

## 2018-09-20 ENCOUNTER — APPOINTMENT (OUTPATIENT)
Dept: SPEECH THERAPY | Facility: CLINIC | Age: 43
End: 2018-09-20

## 2018-09-20 ENCOUNTER — RX RENEWAL (OUTPATIENT)
Age: 43
End: 2018-09-20

## 2018-09-20 DIAGNOSIS — M31.30 WEGENER'S GRANULOMATOSIS WITHOUT RENAL INVOLVEMENT: Chronic | ICD-10-CM

## 2018-09-20 DIAGNOSIS — Z87.19 PERSONAL HISTORY OF OTHER DISEASES OF THE DIGESTIVE SYSTEM: Chronic | ICD-10-CM

## 2018-09-20 LAB
ALBUMIN SERPL ELPH-MCNC: 3.9 G/DL
ALP BLD-CCNC: 52 U/L
ALT SERPL-CCNC: 25 U/L
ANION GAP SERPL CALC-SCNC: 14 MMOL/L
APPEARANCE: CLEAR
AST SERPL-CCNC: 19 U/L
BASOPHILS # BLD AUTO: 0.02 K/UL
BASOPHILS NFR BLD AUTO: 0.4 %
BILIRUB SERPL-MCNC: 0.4 MG/DL
BILIRUBIN URINE: NEGATIVE
BLOOD URINE: NEGATIVE
BUN SERPL-MCNC: 16 MG/DL
CALCIUM SERPL-MCNC: 9.2 MG/DL
CHLORIDE SERPL-SCNC: 102 MMOL/L
CO2 SERPL-SCNC: 23 MMOL/L
COLOR: YELLOW
CREAT SERPL-MCNC: 1.05 MG/DL
CRP SERPL-MCNC: 0.85 MG/DL
EOSINOPHIL # BLD AUTO: 0.2 K/UL
EOSINOPHIL NFR BLD AUTO: 4 %
ERYTHROCYTE [SEDIMENTATION RATE] IN BLOOD BY WESTERGREN METHOD: 11 MM/HR
GLUCOSE QUALITATIVE U: 500 MG/DL
GLUCOSE SERPL-MCNC: 360 MG/DL
HBA1C MFR BLD HPLC: 9.1 %
HCT VFR BLD CALC: 42.1 %
HGB BLD-MCNC: 12.9 G/DL
IMM GRANULOCYTES NFR BLD AUTO: 0 %
KETONES URINE: NEGATIVE
LEUKOCYTE ESTERASE URINE: NEGATIVE
LYMPHOCYTES # BLD AUTO: 2.46 K/UL
LYMPHOCYTES NFR BLD AUTO: 49.3 %
MAN DIFF?: NORMAL
MCHC RBC-ENTMCNC: 29.6 PG
MCHC RBC-ENTMCNC: 30.6 GM/DL
MCV RBC AUTO: 96.6 FL
MONOCYTES # BLD AUTO: 0.52 K/UL
MONOCYTES NFR BLD AUTO: 10.4 %
NEUTROPHILS # BLD AUTO: 1.79 K/UL
NEUTROPHILS NFR BLD AUTO: 35.9 %
NITRITE URINE: NEGATIVE
PH URINE: 5.5
PLATELET # BLD AUTO: 222 K/UL
POTASSIUM SERPL-SCNC: 4.6 MMOL/L
PROT SERPL-MCNC: 6.7 G/DL
PROTEIN URINE: ABNORMAL MG/DL
RBC # BLD: 4.36 M/UL
RBC # FLD: 13.5 %
SODIUM SERPL-SCNC: 139 MMOL/L
SPECIFIC GRAVITY URINE: 1.03
UROBILINOGEN URINE: NEGATIVE MG/DL
WBC # FLD AUTO: 4.99 K/UL

## 2018-09-24 ENCOUNTER — RX RENEWAL (OUTPATIENT)
Age: 43
End: 2018-09-24

## 2018-09-25 DIAGNOSIS — R49.0 DYSPHONIA: ICD-10-CM

## 2018-09-26 ENCOUNTER — APPOINTMENT (OUTPATIENT)
Dept: SPEECH THERAPY | Facility: CLINIC | Age: 43
End: 2018-09-26

## 2018-09-27 ENCOUNTER — APPOINTMENT (OUTPATIENT)
Dept: RHEUMATOLOGY | Facility: CLINIC | Age: 43
End: 2018-09-27
Payer: COMMERCIAL

## 2018-09-27 ENCOUNTER — RX RENEWAL (OUTPATIENT)
Age: 43
End: 2018-09-27

## 2018-09-27 ENCOUNTER — LABORATORY RESULT (OUTPATIENT)
Age: 43
End: 2018-09-27

## 2018-09-27 PROCEDURE — 96413 CHEMO IV INFUSION 1 HR: CPT

## 2018-09-27 PROCEDURE — 96415 CHEMO IV INFUSION ADDL HR: CPT

## 2018-09-27 PROCEDURE — 36415 COLL VENOUS BLD VENIPUNCTURE: CPT

## 2018-09-27 PROCEDURE — 96375 TX/PRO/DX INJ NEW DRUG ADDON: CPT

## 2018-10-03 ENCOUNTER — LABORATORY RESULT (OUTPATIENT)
Age: 43
End: 2018-10-03

## 2018-10-03 ENCOUNTER — APPOINTMENT (OUTPATIENT)
Dept: RHEUMATOLOGY | Facility: CLINIC | Age: 43
End: 2018-10-03
Payer: COMMERCIAL

## 2018-10-03 PROCEDURE — 96415 CHEMO IV INFUSION ADDL HR: CPT

## 2018-10-03 PROCEDURE — 96413 CHEMO IV INFUSION 1 HR: CPT

## 2018-10-03 PROCEDURE — 36415 COLL VENOUS BLD VENIPUNCTURE: CPT

## 2018-10-03 PROCEDURE — 96375 TX/PRO/DX INJ NEW DRUG ADDON: CPT

## 2018-10-13 ENCOUNTER — LABORATORY RESULT (OUTPATIENT)
Age: 43
End: 2018-10-13

## 2018-10-13 ENCOUNTER — APPOINTMENT (OUTPATIENT)
Dept: RHEUMATOLOGY | Facility: CLINIC | Age: 43
End: 2018-10-13
Payer: COMMERCIAL

## 2018-10-13 PROCEDURE — 96413 CHEMO IV INFUSION 1 HR: CPT

## 2018-10-13 PROCEDURE — 96375 TX/PRO/DX INJ NEW DRUG ADDON: CPT

## 2018-10-13 PROCEDURE — 36415 COLL VENOUS BLD VENIPUNCTURE: CPT

## 2018-10-13 PROCEDURE — 96415 CHEMO IV INFUSION ADDL HR: CPT

## 2018-10-15 ENCOUNTER — APPOINTMENT (OUTPATIENT)
Dept: SPEECH THERAPY | Facility: CLINIC | Age: 43
End: 2018-10-15

## 2018-10-22 ENCOUNTER — APPOINTMENT (OUTPATIENT)
Dept: SPEECH THERAPY | Facility: CLINIC | Age: 43
End: 2018-10-22

## 2018-11-09 ENCOUNTER — APPOINTMENT (OUTPATIENT)
Dept: RHEUMATOLOGY | Facility: CLINIC | Age: 43
End: 2018-11-09

## 2018-11-15 ENCOUNTER — APPOINTMENT (OUTPATIENT)
Dept: OTOLARYNGOLOGY | Facility: CLINIC | Age: 43
End: 2018-11-15
Payer: COMMERCIAL

## 2018-11-15 VITALS
WEIGHT: 255 LBS | SYSTOLIC BLOOD PRESSURE: 147 MMHG | HEIGHT: 74 IN | HEART RATE: 91 BPM | DIASTOLIC BLOOD PRESSURE: 93 MMHG | BODY MASS INDEX: 32.73 KG/M2

## 2018-11-15 PROCEDURE — 31579 LARYNGOSCOPY TELESCOPIC: CPT

## 2018-11-15 PROCEDURE — 99214 OFFICE O/P EST MOD 30 MIN: CPT | Mod: 25

## 2018-11-15 RX ORDER — METHOTREXATE 2.5 MG/1
2.5 TABLET ORAL
Qty: 32 | Refills: 3 | Status: DISCONTINUED | COMMUNITY
Start: 2017-09-07 | End: 2018-11-15

## 2018-11-15 RX ORDER — NYSTATIN 100000 [USP'U]/ML
100000 SUSPENSION ORAL 3 TIMES DAILY
Qty: 500 | Refills: 0 | Status: DISCONTINUED | COMMUNITY
Start: 2018-03-01 | End: 2018-11-15

## 2018-11-15 RX ORDER — INSULIN GLARGINE 100 [IU]/ML
100 INJECTION, SOLUTION SUBCUTANEOUS
Qty: 30 | Refills: 0 | Status: DISCONTINUED | COMMUNITY
Start: 2017-10-31 | End: 2018-11-15

## 2018-11-15 RX ORDER — SILDENAFIL 20 MG/1
20 TABLET ORAL
Qty: 30 | Refills: 3 | Status: DISCONTINUED | COMMUNITY
Start: 2018-02-09 | End: 2018-11-15

## 2018-11-15 RX ORDER — ATORVASTATIN CALCIUM 40 MG/1
40 TABLET, FILM COATED ORAL
Qty: 90 | Refills: 0 | Status: DISCONTINUED | COMMUNITY
Start: 2018-04-09 | End: 2018-11-15

## 2018-11-15 RX ORDER — FOLIC ACID 1 MG/1
1 TABLET ORAL DAILY
Qty: 90 | Refills: 3 | Status: DISCONTINUED | COMMUNITY
Start: 2017-09-07 | End: 2018-11-15

## 2019-01-25 ENCOUNTER — APPOINTMENT (OUTPATIENT)
Dept: RHEUMATOLOGY | Facility: CLINIC | Age: 44
End: 2019-01-25
Payer: COMMERCIAL

## 2019-01-25 VITALS
SYSTOLIC BLOOD PRESSURE: 133 MMHG | HEART RATE: 94 BPM | OXYGEN SATURATION: 97 % | DIASTOLIC BLOOD PRESSURE: 90 MMHG | WEIGHT: 260 LBS | TEMPERATURE: 98.7 F | HEIGHT: 74 IN | BODY MASS INDEX: 33.37 KG/M2

## 2019-01-25 DIAGNOSIS — Z79.899 OTHER LONG TERM (CURRENT) DRUG THERAPY: ICD-10-CM

## 2019-01-25 LAB
ALBUMIN SERPL ELPH-MCNC: 4.4 G/DL
ALP BLD-CCNC: 50 U/L
ALT SERPL-CCNC: 25 U/L
ANION GAP SERPL CALC-SCNC: 15 MMOL/L
AST SERPL-CCNC: 15 U/L
BASOPHILS # BLD AUTO: 0.02 K/UL
BASOPHILS NFR BLD AUTO: 0.5 %
BILIRUB SERPL-MCNC: 0.3 MG/DL
BUN SERPL-MCNC: 13 MG/DL
CALCIUM SERPL-MCNC: 9.3 MG/DL
CHLORIDE SERPL-SCNC: 103 MMOL/L
CO2 SERPL-SCNC: 22 MMOL/L
CREAT SERPL-MCNC: 0.92 MG/DL
CRP SERPL-MCNC: 0.31 MG/DL
EOSINOPHIL # BLD AUTO: 0.23 K/UL
EOSINOPHIL NFR BLD AUTO: 5.2 %
ERYTHROCYTE [SEDIMENTATION RATE] IN BLOOD BY WESTERGREN METHOD: 15 MM/HR
GLUCOSE SERPL-MCNC: 269 MG/DL
HCT VFR BLD CALC: 41.3 %
HGB BLD-MCNC: 13.2 G/DL
IMM GRANULOCYTES NFR BLD AUTO: 0.2 %
LYMPHOCYTES # BLD AUTO: 1.92 K/UL
LYMPHOCYTES NFR BLD AUTO: 43.7 %
MAN DIFF?: NORMAL
MCHC RBC-ENTMCNC: 29.3 PG
MCHC RBC-ENTMCNC: 32 GM/DL
MCV RBC AUTO: 91.8 FL
MONOCYTES # BLD AUTO: 0.53 K/UL
MONOCYTES NFR BLD AUTO: 12.1 %
NEUTROPHILS # BLD AUTO: 1.68 K/UL
NEUTROPHILS NFR BLD AUTO: 38.3 %
PLATELET # BLD AUTO: 219 K/UL
POTASSIUM SERPL-SCNC: 4.4 MMOL/L
PROT SERPL-MCNC: 6.7 G/DL
RBC # BLD: 4.5 M/UL
RBC # FLD: 13.3 %
SODIUM SERPL-SCNC: 140 MMOL/L
WBC # FLD AUTO: 4.39 K/UL

## 2019-01-25 PROCEDURE — 99213 OFFICE O/P EST LOW 20 MIN: CPT

## 2019-01-27 LAB — 25(OH)D3 SERPL-MCNC: 24.4 NG/ML

## 2019-02-07 ENCOUNTER — APPOINTMENT (OUTPATIENT)
Dept: OTOLARYNGOLOGY | Facility: CLINIC | Age: 44
End: 2019-02-07

## 2019-03-08 ENCOUNTER — APPOINTMENT (OUTPATIENT)
Dept: RHEUMATOLOGY | Facility: CLINIC | Age: 44
End: 2019-03-08
Payer: COMMERCIAL

## 2019-03-08 VITALS
HEART RATE: 99 BPM | HEIGHT: 74 IN | TEMPERATURE: 98.3 F | SYSTOLIC BLOOD PRESSURE: 146 MMHG | OXYGEN SATURATION: 98 % | WEIGHT: 259 LBS | DIASTOLIC BLOOD PRESSURE: 92 MMHG | BODY MASS INDEX: 33.24 KG/M2

## 2019-03-08 DIAGNOSIS — L30.9 DERMATITIS, UNSPECIFIED: ICD-10-CM

## 2019-03-08 LAB
APPEARANCE: CLEAR
BASOPHILS # BLD AUTO: 0.03 K/UL
BASOPHILS NFR BLD AUTO: 0.6 %
BILIRUBIN URINE: NEGATIVE
BLOOD URINE: NEGATIVE
COLOR: YELLOW
EOSINOPHIL # BLD AUTO: 0.24 K/UL
EOSINOPHIL NFR BLD AUTO: 5.1 %
ERYTHROCYTE [SEDIMENTATION RATE] IN BLOOD BY WESTERGREN METHOD: 15 MM/HR
GLUCOSE QUALITATIVE U: NEGATIVE
HCT VFR BLD CALC: 45.3 %
HGB BLD-MCNC: 14.3 G/DL
IMM GRANULOCYTES NFR BLD AUTO: 0.2 %
KETONES URINE: NEGATIVE
LEUKOCYTE ESTERASE URINE: NEGATIVE
LYMPHOCYTES # BLD AUTO: 1.94 K/UL
LYMPHOCYTES NFR BLD AUTO: 41.4 %
MAN DIFF?: NORMAL
MCHC RBC-ENTMCNC: 29.6 PG
MCHC RBC-ENTMCNC: 31.6 GM/DL
MCV RBC AUTO: 93.8 FL
MONOCYTES # BLD AUTO: 0.52 K/UL
MONOCYTES NFR BLD AUTO: 11.1 %
NEUTROPHILS # BLD AUTO: 1.95 K/UL
NEUTROPHILS NFR BLD AUTO: 41.6 %
NITRITE URINE: NEGATIVE
PH URINE: 6
PLATELET # BLD AUTO: 199 K/UL
PROTEIN URINE: NORMAL
RBC # BLD: 4.83 M/UL
RBC # FLD: 13.1 %
SPECIFIC GRAVITY URINE: 1.03
UROBILINOGEN URINE: NORMAL
WBC # FLD AUTO: 4.69 K/UL

## 2019-03-08 PROCEDURE — 99214 OFFICE O/P EST MOD 30 MIN: CPT

## 2019-03-08 RX ORDER — HYDRALAZINE HYDROCHLORIDE 10 MG/1
10 TABLET ORAL EVERY 6 HOURS
Refills: 0 | Status: DISCONTINUED | COMMUNITY
Start: 2019-03-08 | End: 2019-03-08

## 2019-03-08 RX ORDER — METOPROLOL SUCCINATE 100 MG/1
100 TABLET, EXTENDED RELEASE ORAL
Qty: 90 | Refills: 0 | Status: DISCONTINUED | COMMUNITY
Start: 2018-04-11 | End: 2019-03-08

## 2019-03-09 LAB
25(OH)D3 SERPL-MCNC: 21.1 NG/ML
ALBUMIN SERPL ELPH-MCNC: 4.2 G/DL
ALP BLD-CCNC: 46 U/L
ALT SERPL-CCNC: 20 U/L
ANION GAP SERPL CALC-SCNC: 13 MMOL/L
AST SERPL-CCNC: 15 U/L
BILIRUB SERPL-MCNC: 0.4 MG/DL
BUN SERPL-MCNC: 15 MG/DL
CALCIUM SERPL-MCNC: 9.3 MG/DL
CHLORIDE SERPL-SCNC: 103 MMOL/L
CHOLEST SERPL-MCNC: 120 MG/DL
CHOLEST/HDLC SERPL: 2.9 RATIO
CO2 SERPL-SCNC: 24 MMOL/L
CREAT SERPL-MCNC: 1.01 MG/DL
CRP SERPL-MCNC: 0.25 MG/DL
GLUCOSE SERPL-MCNC: 183 MG/DL
HBA1C MFR BLD HPLC: 10 %
HBV CORE IGG+IGM SER QL: NONREACTIVE
HBV SURFACE AB SER QL: NONREACTIVE
HBV SURFACE AG SER QL: NONREACTIVE
HCV AB SER QL: NONREACTIVE
HCV S/CO RATIO: 0.06 S/CO
HDLC SERPL-MCNC: 42 MG/DL
LDLC SERPL CALC-MCNC: 62 MG/DL
POTASSIUM SERPL-SCNC: 4.5 MMOL/L
PROT SERPL-MCNC: 6.8 G/DL
SODIUM SERPL-SCNC: 140 MMOL/L
TRIGL SERPL-MCNC: 78 MG/DL

## 2019-03-13 LAB
M TB IFN-G BLD-IMP: NEGATIVE
QUANTIFERON TB PLUS MITOGEN MINUS NIL: >10 IU/ML
QUANTIFERON TB PLUS NIL: 0.1 IU/ML
QUANTIFERON TB PLUS TB1 MINUS NIL: -0.01 IU/ML
QUANTIFERON TB PLUS TB2 MINUS NIL: 0.01 IU/ML

## 2019-04-04 ENCOUNTER — APPOINTMENT (OUTPATIENT)
Dept: OTOLARYNGOLOGY | Facility: CLINIC | Age: 44
End: 2019-04-04
Payer: COMMERCIAL

## 2019-04-04 VITALS — HEART RATE: 78 BPM | SYSTOLIC BLOOD PRESSURE: 146 MMHG | DIASTOLIC BLOOD PRESSURE: 96 MMHG

## 2019-04-04 DIAGNOSIS — J38.4 EDEMA OF LARYNX: ICD-10-CM

## 2019-04-04 PROCEDURE — 31579 LARYNGOSCOPY TELESCOPIC: CPT

## 2019-04-04 PROCEDURE — 99214 OFFICE O/P EST MOD 30 MIN: CPT | Mod: 25

## 2019-04-04 NOTE — HISTORY OF PRESENT ILLNESS
[de-identified] : 42yo M here for f/u dysphonia. Voice got worse but now after two weeks of nystatin it has improved. As day goes on, voice improves. Seeing rheumatologist for presumed Wegener's, Rituxan infusion pending. BS control is still a struggle. Has stopped VT. Taking ranitidine twice a day. Using the nystatin swish and swallow and noticed some improvement with the voice. No dysphagia. No SOB. No hemoptysis. Has changed insulin regimen but still problems. Denies dysphagia. Nasal spray has helped in the past. Off steroids for a number of months. \par \par \par \par \par

## 2019-04-04 NOTE — PHYSICAL EXAM
[Midline] : trachea located in midline position [Normal] : no rashes [Laryngoscopy Performed] : laryngoscopy was performed, see procedure section for findings [de-identified] : thrush  [de-identified] : improved voice, moderately raspy, not breathy

## 2019-04-04 NOTE — CONSULT LETTER
[Dear  ___] : Dear  [unfilled], [Consult Letter:] : I had the pleasure of evaluating your patient, [unfilled]. [Please see my note below.] : Please see my note below. [Consult Closing:] : Thank you very much for allowing me to participate in the care of this patient.  If you have any questions, please do not hesitate to contact me. [Sincerely,] : Sincerely, [DrMarcellus  ___] : Dr. CERNA [FreeTextEntry3] : Olvin Onofre MD, PhD\par Chief, Division of Laryngology\par Department of Otolaryngology\par [facility]\par Pediatric Otolaryngology, [facility]\par  of Otolaryngology\par [facility] [employer] [facility]\par \par \par

## 2019-05-13 ENCOUNTER — APPOINTMENT (OUTPATIENT)
Dept: RHEUMATOLOGY | Facility: CLINIC | Age: 44
End: 2019-05-13
Payer: COMMERCIAL

## 2019-05-13 PROCEDURE — 96375 TX/PRO/DX INJ NEW DRUG ADDON: CPT

## 2019-05-13 PROCEDURE — 96413 CHEMO IV INFUSION 1 HR: CPT

## 2019-05-13 PROCEDURE — 96415 CHEMO IV INFUSION ADDL HR: CPT

## 2019-05-23 ENCOUNTER — LABORATORY RESULT (OUTPATIENT)
Age: 44
End: 2019-05-23

## 2019-05-23 ENCOUNTER — APPOINTMENT (OUTPATIENT)
Dept: RHEUMATOLOGY | Facility: CLINIC | Age: 44
End: 2019-05-23
Payer: COMMERCIAL

## 2019-05-23 PROCEDURE — 96415 CHEMO IV INFUSION ADDL HR: CPT

## 2019-05-23 PROCEDURE — 96413 CHEMO IV INFUSION 1 HR: CPT

## 2019-05-23 PROCEDURE — 96375 TX/PRO/DX INJ NEW DRUG ADDON: CPT

## 2019-05-30 ENCOUNTER — APPOINTMENT (OUTPATIENT)
Dept: RHEUMATOLOGY | Facility: CLINIC | Age: 44
End: 2019-05-30
Payer: COMMERCIAL

## 2019-05-30 PROCEDURE — 96415 CHEMO IV INFUSION ADDL HR: CPT

## 2019-05-30 PROCEDURE — 96413 CHEMO IV INFUSION 1 HR: CPT

## 2019-05-30 PROCEDURE — 96375 TX/PRO/DX INJ NEW DRUG ADDON: CPT

## 2019-06-06 ENCOUNTER — APPOINTMENT (OUTPATIENT)
Dept: RHEUMATOLOGY | Facility: CLINIC | Age: 44
End: 2019-06-06
Payer: COMMERCIAL

## 2019-06-06 ENCOUNTER — LABORATORY RESULT (OUTPATIENT)
Age: 44
End: 2019-06-06

## 2019-06-06 PROCEDURE — 96413 CHEMO IV INFUSION 1 HR: CPT

## 2019-06-06 PROCEDURE — 96415 CHEMO IV INFUSION ADDL HR: CPT

## 2019-06-06 PROCEDURE — 36415 COLL VENOUS BLD VENIPUNCTURE: CPT

## 2019-06-06 PROCEDURE — 96375 TX/PRO/DX INJ NEW DRUG ADDON: CPT

## 2019-07-18 ENCOUNTER — APPOINTMENT (OUTPATIENT)
Dept: OTOLARYNGOLOGY | Facility: CLINIC | Age: 44
End: 2019-07-18
Payer: COMMERCIAL

## 2019-07-18 VITALS
WEIGHT: 260 LBS | SYSTOLIC BLOOD PRESSURE: 135 MMHG | HEART RATE: 85 BPM | BODY MASS INDEX: 33.37 KG/M2 | DIASTOLIC BLOOD PRESSURE: 98 MMHG | HEIGHT: 74 IN

## 2019-07-18 DIAGNOSIS — J38.7 OTHER DISEASES OF LARYNX: ICD-10-CM

## 2019-07-18 PROCEDURE — 99214 OFFICE O/P EST MOD 30 MIN: CPT | Mod: 25

## 2019-07-18 PROCEDURE — 31579 LARYNGOSCOPY TELESCOPIC: CPT

## 2019-07-18 RX ORDER — OMEPRAZOLE 20 MG/1
20 CAPSULE, DELAYED RELEASE ORAL
Qty: 90 | Refills: 1 | Status: DISCONTINUED | COMMUNITY
Start: 2017-03-23 | End: 2019-07-18

## 2019-07-18 RX ORDER — FLUTICASONE PROPIONATE 50 UG/1
50 SPRAY, METERED NASAL TWICE DAILY
Qty: 1 | Refills: 0 | Status: DISCONTINUED | COMMUNITY
Start: 2019-04-04 | End: 2019-07-18

## 2019-07-18 RX ORDER — NYSTATIN 100000 [USP'U]/ML
100000 SUSPENSION ORAL 3 TIMES DAILY
Qty: 2 | Refills: 2 | Status: DISCONTINUED | COMMUNITY
Start: 2018-07-05 | End: 2019-07-18

## 2019-07-18 RX ORDER — RANITIDINE 150 MG/1
150 TABLET ORAL
Qty: 60 | Refills: 5 | Status: DISCONTINUED | COMMUNITY
Start: 2018-06-29 | End: 2019-07-18

## 2019-07-18 NOTE — REASON FOR VISIT
[Subsequent Evaluation] : a subsequent evaluation for [Other: _____] : [unfilled] [FreeTextEntry2] : follow up for dysphonia and laryngeal mass.

## 2019-07-18 NOTE — CONSULT LETTER
[Dear  ___] : Dear  [unfilled], [Consult Letter:] : I had the pleasure of evaluating your patient, [unfilled]. [Please see my note below.] : Please see my note below. [Consult Closing:] : Thank you very much for allowing me to participate in the care of this patient.  If you have any questions, please do not hesitate to contact me. [Sincerely,] : Sincerely, [FreeTextEntry3] : Olvin Onofre MD, PhD\par Chief, Division of Laryngology\par Department of Otolaryngology\par [facility]\par Pediatric Otolaryngology, [facility]\par  of Otolaryngology\par [facility] [employer] [facility]\par \par \par  [DrMarcellus  ___] : Dr. CERNA

## 2019-07-18 NOTE — PHYSICAL EXAM
[FreeTextEntry1] : mildly raspy voice, much improved [Midline] : trachea located in midline position [Laryngoscopy Performed] : laryngoscopy was performed, see procedure section for findings [de-identified] : thrush  [Normal] : no rashes [de-identified] : improved voice, moderately raspy, not breathy

## 2019-07-18 NOTE — HISTORY OF PRESENT ILLNESS
[de-identified] : 43 year old male follow up for dysphonia and laryngeal mass.  Patient states he is feeling well, states voice has improved.  Denies dysphagia, odynophagia, bleeding, hemoptysis, dyspnea and fevers.  Tolerating eating and dirnking with no choking/aspiration.  States Flonase helped in the past, no longer using. C/o significantly worse heartburn, using OTC meds- prilosec and zantac. No hemoptysis. Off all rheum meds. F/u pending. No otalgia. Better halitosis.\par Missed endo appointment, BS not well controlled. Not taking steroids.\par \par

## 2019-07-22 RX ORDER — OMEPRAZOLE 20 MG/1
20 CAPSULE, DELAYED RELEASE ORAL DAILY
Qty: 90 | Refills: 0 | Status: DISCONTINUED | COMMUNITY
Start: 2019-07-18 | End: 2019-07-22

## 2019-08-13 ENCOUNTER — APPOINTMENT (OUTPATIENT)
Dept: RHEUMATOLOGY | Facility: CLINIC | Age: 44
End: 2019-08-13
Payer: COMMERCIAL

## 2019-08-13 VITALS
WEIGHT: 256 LBS | DIASTOLIC BLOOD PRESSURE: 97 MMHG | OXYGEN SATURATION: 97 % | SYSTOLIC BLOOD PRESSURE: 137 MMHG | TEMPERATURE: 98.9 F | BODY MASS INDEX: 32.85 KG/M2 | HEIGHT: 74 IN | HEART RATE: 106 BPM

## 2019-08-13 LAB
ALBUMIN SERPL ELPH-MCNC: 4.5 G/DL
ALP BLD-CCNC: 44 U/L
ALT SERPL-CCNC: 25 U/L
ANION GAP SERPL CALC-SCNC: 14 MMOL/L
APPEARANCE: CLEAR
AST SERPL-CCNC: 17 U/L
BASOPHILS # BLD AUTO: 0.02 K/UL
BASOPHILS NFR BLD AUTO: 0.4 %
BILIRUB SERPL-MCNC: 0.4 MG/DL
BILIRUBIN URINE: NEGATIVE
BLOOD URINE: NEGATIVE
BUN SERPL-MCNC: 11 MG/DL
CALCIUM SERPL-MCNC: 9.4 MG/DL
CHLORIDE SERPL-SCNC: 104 MMOL/L
CO2 SERPL-SCNC: 22 MMOL/L
COLOR: YELLOW
CREAT SERPL-MCNC: 0.96 MG/DL
CRP SERPL-MCNC: 0.54 MG/DL
DEPRECATED KAPPA LC FREE/LAMBDA SER: 0.91 RATIO
EOSINOPHIL # BLD AUTO: 0.19 K/UL
EOSINOPHIL NFR BLD AUTO: 3.6 %
ERYTHROCYTE [SEDIMENTATION RATE] IN BLOOD BY WESTERGREN METHOD: 20 MM/HR
ESTIMATED AVERAGE GLUCOSE: 249 MG/DL
FOLATE SERPL-MCNC: 11.2 NG/ML
GLUCOSE QUALITATIVE U: ABNORMAL
GLUCOSE SERPL-MCNC: 239 MG/DL
HBA1C MFR BLD HPLC: 10.3 %
HCT VFR BLD CALC: 40.2 %
HGB BLD-MCNC: 13.2 G/DL
IGA SER QL IEP: 361 MG/DL
IGG SER QL IEP: 826 MG/DL
IGM SER QL IEP: 52 MG/DL
IMM GRANULOCYTES NFR BLD AUTO: 0.2 %
IRON SATN MFR SERPL: 37 %
IRON SERPL-MCNC: 96 UG/DL
KAPPA LC CSF-MCNC: 1.49 MG/DL
KAPPA LC SERPL-MCNC: 1.35 MG/DL
KETONES URINE: NEGATIVE
LEUKOCYTE ESTERASE URINE: NEGATIVE
LYMPHOCYTES # BLD AUTO: 1.72 K/UL
LYMPHOCYTES NFR BLD AUTO: 32.2 %
MAN DIFF?: NORMAL
MCHC RBC-ENTMCNC: 30.6 PG
MCHC RBC-ENTMCNC: 32.8 GM/DL
MCV RBC AUTO: 93.3 FL
MONOCYTES # BLD AUTO: 0.57 K/UL
MONOCYTES NFR BLD AUTO: 10.7 %
NEUTROPHILS # BLD AUTO: 2.83 K/UL
NEUTROPHILS NFR BLD AUTO: 52.9 %
NITRITE URINE: NEGATIVE
PH URINE: 6
PLATELET # BLD AUTO: 222 K/UL
POTASSIUM SERPL-SCNC: 4.7 MMOL/L
PROT SERPL-MCNC: 7 G/DL
PROTEIN URINE: NORMAL
RBC # BLD: 4.31 M/UL
RBC # FLD: 13 %
SODIUM SERPL-SCNC: 140 MMOL/L
SPECIFIC GRAVITY URINE: 1.03
TIBC SERPL-MCNC: 263 UG/DL
TSH SERPL-ACNC: 0.35 UIU/ML
UIBC SERPL-MCNC: 167 UG/DL
UROBILINOGEN URINE: NORMAL
VIT B12 SERPL-MCNC: 635 PG/ML
WBC # FLD AUTO: 5.34 K/UL

## 2019-08-13 PROCEDURE — 99214 OFFICE O/P EST MOD 30 MIN: CPT

## 2019-08-14 LAB — 25(OH)D3 SERPL-MCNC: 22.8 NG/ML

## 2019-09-20 NOTE — H&P PST ADULT - NS SC CAGE ALCOHOL CUT DOWN
Consent: The patient's consent was obtained including but not limited to risks of crusting, scabbing, blistering, scarring, darker or lighter pigmentary change, recurrence, incomplete removal and infection. Detail Level: Detailed Duration Of Freeze Thaw-Cycle (Seconds): 0 Post-Care Instructions: I reviewed with the patient in detail post-care instructions. Patient is to wear sunprotection, and avoid picking at any of the treated lesions. Pt may apply Vaseline to crusted or scabbing areas. Render Note In Bullet Format When Appropriate: No no

## 2019-10-17 ENCOUNTER — RX RENEWAL (OUTPATIENT)
Age: 44
End: 2019-10-17

## 2019-11-07 ENCOUNTER — APPOINTMENT (OUTPATIENT)
Dept: OTOLARYNGOLOGY | Facility: CLINIC | Age: 44
End: 2019-11-07

## 2020-01-30 ENCOUNTER — APPOINTMENT (OUTPATIENT)
Dept: RHEUMATOLOGY | Facility: CLINIC | Age: 45
End: 2020-01-30
Payer: COMMERCIAL

## 2020-01-30 VITALS
SYSTOLIC BLOOD PRESSURE: 156 MMHG | WEIGHT: 262 LBS | HEART RATE: 92 BPM | BODY MASS INDEX: 33.62 KG/M2 | OXYGEN SATURATION: 97 % | DIASTOLIC BLOOD PRESSURE: 108 MMHG | TEMPERATURE: 98.3 F | HEIGHT: 74 IN

## 2020-01-30 DIAGNOSIS — R22.1 LOCALIZED SWELLING, MASS AND LUMP, NECK: ICD-10-CM

## 2020-01-30 PROCEDURE — 99213 OFFICE O/P EST LOW 20 MIN: CPT

## 2020-01-30 RX ORDER — RANITIDINE 300 MG/1
300 TABLET ORAL
Qty: 90 | Refills: 0 | Status: DISCONTINUED | COMMUNITY
Start: 2019-07-18 | End: 2020-01-30

## 2020-01-31 LAB
ALBUMIN SERPL ELPH-MCNC: 4.5 G/DL
ALP BLD-CCNC: 46 U/L
ALT SERPL-CCNC: 25 U/L
ANION GAP SERPL CALC-SCNC: 15 MMOL/L
APPEARANCE: CLEAR
AST SERPL-CCNC: 18 U/L
BASOPHILS # BLD AUTO: 0.03 K/UL
BASOPHILS NFR BLD AUTO: 0.5 %
BILIRUB SERPL-MCNC: 0.4 MG/DL
BILIRUBIN URINE: NEGATIVE
BLOOD URINE: NEGATIVE
BUN SERPL-MCNC: 13 MG/DL
CALCIUM SERPL-MCNC: 10 MG/DL
CHLORIDE SERPL-SCNC: 103 MMOL/L
CO2 SERPL-SCNC: 26 MMOL/L
COLOR: NORMAL
CREAT SERPL-MCNC: 1.06 MG/DL
CRP SERPL-MCNC: 0.36 MG/DL
EOSINOPHIL # BLD AUTO: 0.23 K/UL
EOSINOPHIL NFR BLD AUTO: 3.8 %
ERYTHROCYTE [SEDIMENTATION RATE] IN BLOOD BY WESTERGREN METHOD: 11 MM/HR
GLUCOSE QUALITATIVE U: ABNORMAL
GLUCOSE SERPL-MCNC: 225 MG/DL
HCT VFR BLD CALC: 46 %
HGB BLD-MCNC: 14.2 G/DL
IMM GRANULOCYTES NFR BLD AUTO: 0.2 %
KETONES URINE: NEGATIVE
LEUKOCYTE ESTERASE URINE: NEGATIVE
LYMPHOCYTES # BLD AUTO: 2.64 K/UL
LYMPHOCYTES NFR BLD AUTO: 44 %
MAN DIFF?: NORMAL
MCHC RBC-ENTMCNC: 30 PG
MCHC RBC-ENTMCNC: 30.9 GM/DL
MCV RBC AUTO: 97 FL
MONOCYTES # BLD AUTO: 0.54 K/UL
MONOCYTES NFR BLD AUTO: 9 %
NEUTROPHILS # BLD AUTO: 2.55 K/UL
NEUTROPHILS NFR BLD AUTO: 42.5 %
NITRITE URINE: NEGATIVE
PH URINE: 6
PLATELET # BLD AUTO: 205 K/UL
POTASSIUM SERPL-SCNC: 4.4 MMOL/L
PROT SERPL-MCNC: 7 G/DL
PROTEIN URINE: NORMAL
RBC # BLD: 4.74 M/UL
RBC # FLD: 13 %
SODIUM SERPL-SCNC: 144 MMOL/L
SPECIFIC GRAVITY URINE: 1.02
UROBILINOGEN URINE: NORMAL
WBC # FLD AUTO: 6 K/UL

## 2020-05-27 ENCOUNTER — APPOINTMENT (OUTPATIENT)
Dept: OTOLARYNGOLOGY | Facility: CLINIC | Age: 45
End: 2020-05-27

## 2020-07-22 ENCOUNTER — TRANSCRIPTION ENCOUNTER (OUTPATIENT)
Age: 45
End: 2020-07-22

## 2020-08-18 ENCOUNTER — APPOINTMENT (OUTPATIENT)
Dept: RHEUMATOLOGY | Facility: CLINIC | Age: 45
End: 2020-08-18
Payer: COMMERCIAL

## 2020-08-18 VITALS
HEART RATE: 83 BPM | HEIGHT: 74 IN | WEIGHT: 258 LBS | BODY MASS INDEX: 33.11 KG/M2 | TEMPERATURE: 98 F | OXYGEN SATURATION: 97 % | RESPIRATION RATE: 18 BRPM | DIASTOLIC BLOOD PRESSURE: 93 MMHG | SYSTOLIC BLOOD PRESSURE: 162 MMHG

## 2020-08-18 DIAGNOSIS — Z11.59 ENCOUNTER FOR SCREENING FOR OTHER VIRAL DISEASES: ICD-10-CM

## 2020-08-18 PROCEDURE — 99213 OFFICE O/P EST LOW 20 MIN: CPT

## 2020-08-18 RX ORDER — HYDRALAZINE HYDROCHLORIDE 50 MG/1
50 TABLET ORAL 3 TIMES DAILY
Refills: 0 | Status: DISCONTINUED | COMMUNITY
Start: 2019-01-25 | End: 2020-08-18

## 2020-08-18 RX ORDER — ATORVASTATIN CALCIUM 40 MG/1
40 TABLET, FILM COATED ORAL
Qty: 90 | Refills: 3 | Status: DISCONTINUED | COMMUNITY
Start: 2019-03-08 | End: 2020-08-18

## 2020-08-18 RX ORDER — TADALAFIL 20 MG/1
20 TABLET ORAL
Qty: 12 | Refills: 0 | Status: ACTIVE | COMMUNITY
Start: 2020-07-20

## 2020-08-18 RX ORDER — AZITHROMYCIN 250 MG/1
250 TABLET, FILM COATED ORAL
Qty: 6 | Refills: 0 | Status: DISCONTINUED | COMMUNITY
Start: 2020-02-22 | End: 2020-08-18

## 2020-08-19 LAB
ALBUMIN SERPL ELPH-MCNC: 4.2 G/DL
ALP BLD-CCNC: 44 U/L
ALT SERPL-CCNC: 17 U/L
ANION GAP SERPL CALC-SCNC: 14 MMOL/L
AST SERPL-CCNC: 15 U/L
BASOPHILS # BLD AUTO: 0.03 K/UL
BASOPHILS NFR BLD AUTO: 0.4 %
BILIRUB SERPL-MCNC: 0.3 MG/DL
BUN SERPL-MCNC: 11 MG/DL
CALCIUM SERPL-MCNC: 9.6 MG/DL
CHLORIDE SERPL-SCNC: 106 MMOL/L
CO2 SERPL-SCNC: 25 MMOL/L
CREAT SERPL-MCNC: 1.2 MG/DL
CRP SERPL-MCNC: 0.41 MG/DL
EOSINOPHIL # BLD AUTO: 0.21 K/UL
EOSINOPHIL NFR BLD AUTO: 3 %
ERYTHROCYTE [SEDIMENTATION RATE] IN BLOOD BY WESTERGREN METHOD: 15 MM/HR
GLUCOSE SERPL-MCNC: 82 MG/DL
HCT VFR BLD CALC: 39.6 %
HGB BLD-MCNC: 12.5 G/DL
IMM GRANULOCYTES NFR BLD AUTO: 0.1 %
LYMPHOCYTES # BLD AUTO: 2.82 K/UL
LYMPHOCYTES NFR BLD AUTO: 39.9 %
MAN DIFF?: NORMAL
MCHC RBC-ENTMCNC: 30.1 PG
MCHC RBC-ENTMCNC: 31.6 GM/DL
MCV RBC AUTO: 95.4 FL
MONOCYTES # BLD AUTO: 0.6 K/UL
MONOCYTES NFR BLD AUTO: 8.5 %
NEUTROPHILS # BLD AUTO: 3.39 K/UL
NEUTROPHILS NFR BLD AUTO: 48.1 %
PLATELET # BLD AUTO: 219 K/UL
POTASSIUM SERPL-SCNC: 4.8 MMOL/L
PROT SERPL-MCNC: 6.5 G/DL
RBC # BLD: 4.15 M/UL
RBC # FLD: 13.2 %
SARS-COV-2 IGG SERPL IA-ACNC: 0.09 INDEX
SARS-COV-2 IGG SERPL QL IA: NEGATIVE
SODIUM SERPL-SCNC: 145 MMOL/L
WBC # FLD AUTO: 7.06 K/UL

## 2020-10-01 ENCOUNTER — APPOINTMENT (OUTPATIENT)
Dept: OTOLARYNGOLOGY | Facility: CLINIC | Age: 45
End: 2020-10-01
Payer: COMMERCIAL

## 2020-10-01 VITALS — SYSTOLIC BLOOD PRESSURE: 147 MMHG | DIASTOLIC BLOOD PRESSURE: 90 MMHG | HEART RATE: 84 BPM

## 2020-10-01 PROCEDURE — 31579 LARYNGOSCOPY TELESCOPIC: CPT

## 2020-10-01 PROCEDURE — 99214 OFFICE O/P EST MOD 30 MIN: CPT | Mod: 25

## 2020-10-01 NOTE — CONSULT LETTER
[Dear  ___] : Dear  [unfilled], [Consult Letter:] : I had the pleasure of evaluating your patient, [unfilled]. [Please see my note below.] : Please see my note below. [Consult Closing:] : Thank you very much for allowing me to participate in the care of this patient.  If you have any questions, please do not hesitate to contact me. [Sincerely,] : Sincerely, [DrMarcellus  ___] : Dr. CERNA [FreeTextEntry3] : Olvin Onofre MD, PhD\par Chief, Division of Laryngology\par Department of Otolaryngology\par [facility]\par Pediatric Otolaryngology, [facility]\par  of Otolaryngology\par [facility]\par \par \par

## 2020-10-01 NOTE — HISTORY OF PRESENT ILLNESS
[de-identified] : 44 year old male follow up for dysphonia and laryngeal mass.  Patient states he is feeling well, states voice has been improving.  Denies dysphagia, odynophagia, dyspnea and fevers.  Tolerating eating and drinking with no choking/aspiration.  Taking famotidine for reflux was rxed by Carmen Newman. No longer on omeprazole. BS under control. No hemoptysis. Had yellow phlegm was concerned something is coming back. Voice was on and off but for the most part has been good. Was using nystatin PRN for thrush. Off all rheum meds. Seen in August. Using flonase PRN for nasal congestion and sinus pain/pressure and it helps. \par

## 2020-10-01 NOTE — PHYSICAL EXAM
[Midline] : trachea located in midline position [Laryngoscopy Performed] : laryngoscopy was performed, see procedure section for findings [Normal] : no rashes [FreeTextEntry1] : mildly raspy voice, much improved [de-identified] : thrush  [de-identified] : improved voice, moderately raspy, not breathy

## 2020-10-26 ENCOUNTER — TRANSCRIPTION ENCOUNTER (OUTPATIENT)
Age: 45
End: 2020-10-26

## 2020-12-10 DIAGNOSIS — M54.5 LOW BACK PAIN: ICD-10-CM

## 2020-12-11 ENCOUNTER — NON-APPOINTMENT (OUTPATIENT)
Age: 45
End: 2020-12-11

## 2021-01-20 ENCOUNTER — APPOINTMENT (OUTPATIENT)
Dept: OTOLARYNGOLOGY | Facility: CLINIC | Age: 46
End: 2021-01-20
Payer: COMMERCIAL

## 2021-01-20 PROCEDURE — 99072 ADDL SUPL MATRL&STAF TM PHE: CPT

## 2021-01-20 PROCEDURE — 31579 LARYNGOSCOPY TELESCOPIC: CPT

## 2021-01-20 PROCEDURE — 99214 OFFICE O/P EST MOD 30 MIN: CPT | Mod: 25

## 2021-01-20 NOTE — CONSULT LETTER
[Dear  ___] : Dear  [unfilled], [Consult Letter:] : I had the pleasure of evaluating your patient, [unfilled]. [Please see my note below.] : Please see my note below. [Consult Closing:] : Thank you very much for allowing me to participate in the care of this patient.  If you have any questions, please do not hesitate to contact me. [Sincerely,] : Sincerely, [DrMarcellus  ___] : Dr. CERNA [FreeTextEntry3] : Olvin Onofre MD, PhD\par Chief, Division of Laryngology\par Department of Otolaryngology\par [facility]\par Pediatric Otolaryngology, [facility]\par  of Otolaryngology\par [facility] [employer] [facility]\par \par \par

## 2021-01-20 NOTE — HISTORY OF PRESENT ILLNESS
[de-identified] : 45 year old male follow up for dysphonia and laryngeal mass.  Patient states he is feeling well, states voice has getting better.  Denies dysphagia, odynophagia, dyspnea. No choking/aspiratio. On nexium for reflux and nystatin for thrush but not consistent. Taking famotidine at night. BS under control. No hemoptysis. Off all rheum meds. Seen in August. Using flonase PRN for nasal congestion. Spoke with LEYDI Newman about back pain, better now. Better halitosis.\par \par \par

## 2021-01-20 NOTE — PHYSICAL EXAM
[Midline] : trachea located in midline position [Laryngoscopy Performed] : laryngoscopy was performed, see procedure section for findings [Normal] : no rashes [FreeTextEntry1] : mildly raspy voice, much improved [de-identified] : thrush  [de-identified] : improved voice, moderately raspy, not breathy

## 2021-01-25 ENCOUNTER — EMERGENCY (EMERGENCY)
Facility: HOSPITAL | Age: 46
LOS: 1 days | Discharge: HOME | End: 2021-01-25
Attending: EMERGENCY MEDICINE | Admitting: EMERGENCY MEDICINE
Payer: SELF-PAY

## 2021-01-25 VITALS
OXYGEN SATURATION: 99 % | TEMPERATURE: 99 F | DIASTOLIC BLOOD PRESSURE: 83 MMHG | RESPIRATION RATE: 16 BRPM | SYSTOLIC BLOOD PRESSURE: 160 MMHG | HEART RATE: 90 BPM

## 2021-01-25 VITALS
SYSTOLIC BLOOD PRESSURE: 183 MMHG | WEIGHT: 259.93 LBS | TEMPERATURE: 98 F | HEIGHT: 75 IN | RESPIRATION RATE: 14 BRPM | DIASTOLIC BLOOD PRESSURE: 112 MMHG | HEART RATE: 87 BPM | OXYGEN SATURATION: 97 %

## 2021-01-25 DIAGNOSIS — M31.30 WEGENER'S GRANULOMATOSIS WITHOUT RENAL INVOLVEMENT: Chronic | ICD-10-CM

## 2021-01-25 DIAGNOSIS — Z87.19 PERSONAL HISTORY OF OTHER DISEASES OF THE DIGESTIVE SYSTEM: Chronic | ICD-10-CM

## 2021-01-25 PROCEDURE — 99284 EMERGENCY DEPT VISIT MOD MDM: CPT

## 2021-01-25 PROCEDURE — 99284 EMERGENCY DEPT VISIT MOD MDM: CPT | Mod: 25

## 2021-01-25 PROCEDURE — 72110 X-RAY EXAM L-2 SPINE 4/>VWS: CPT

## 2021-01-25 PROCEDURE — 96372 THER/PROPH/DIAG INJ SC/IM: CPT

## 2021-01-25 PROCEDURE — 72110 X-RAY EXAM L-2 SPINE 4/>VWS: CPT | Mod: 26

## 2021-01-25 RX ORDER — KETOROLAC TROMETHAMINE 30 MG/ML
30 SYRINGE (ML) INJECTION ONCE
Refills: 0 | Status: DISCONTINUED | OUTPATIENT
Start: 2021-01-25 | End: 2021-01-25

## 2021-01-25 RX ORDER — IBUPROFEN 200 MG
1 TABLET ORAL
Qty: 21 | Refills: 0
Start: 2021-01-25 | End: 2021-01-31

## 2021-01-25 RX ORDER — MORPHINE SULFATE 50 MG/1
4 CAPSULE, EXTENDED RELEASE ORAL ONCE
Refills: 0 | Status: DISCONTINUED | OUTPATIENT
Start: 2021-01-25 | End: 2021-01-25

## 2021-01-25 RX ORDER — DIAZEPAM 5 MG
5 TABLET ORAL ONCE
Refills: 0 | Status: DISCONTINUED | OUTPATIENT
Start: 2021-01-25 | End: 2021-01-25

## 2021-01-25 RX ORDER — METHOCARBAMOL 500 MG/1
2 TABLET, FILM COATED ORAL
Qty: 30 | Refills: 0
Start: 2021-01-25 | End: 2021-01-29

## 2021-01-25 RX ORDER — LIDOCAINE 4 G/100G
1 CREAM TOPICAL ONCE
Refills: 0 | Status: COMPLETED | OUTPATIENT
Start: 2021-01-25 | End: 2021-01-25

## 2021-01-25 RX ADMIN — Medication 30 MILLIGRAM(S): at 13:59

## 2021-01-25 RX ADMIN — Medication 5 MILLIGRAM(S): at 13:59

## 2021-01-25 RX ADMIN — LIDOCAINE 1 PATCH: 4 CREAM TOPICAL at 13:59

## 2021-01-25 RX ADMIN — MORPHINE SULFATE 4 MILLIGRAM(S): 50 CAPSULE, EXTENDED RELEASE ORAL at 15:26

## 2021-01-25 NOTE — ED PROVIDER NOTE - CLINICAL SUMMARY MEDICAL DECISION MAKING FREE TEXT BOX
46 yo male with h/o DM, HTN, HLD, obesity BIBA c/o left sided lower back pain occurred while lifting a heavy blow to put into his truck at work prior to arrival. Patient admits to having previous back pain however denies having any imaging or follow up. Pain is sharp, constant, worse with movement, nonradiating. Patient states he pain was sudden, severe and lowered himself to the ground. Denies fever, chills, chest pain, sob, abd pain, N/V, urinary sxs, flank pain. no UE/LE weakness or paresthesias, no saddle anesthesia, no bowel or bladder incontinence/retention, no prior back surgery. PE: as above. A/P: meds, lumbar spine xray, reassess.

## 2021-01-25 NOTE — ED ADULT NURSE NOTE - CHIEF COMPLAINT QUOTE
Rosacea      Azelaic acid 15 % gel apply two times per day    Doxycycline 50 mg one cap 1-2 times per day\    Recheck in one year    Leg dermatitis :      Triamcinolone 0.1% cream apply two times when needed  
low back pain while putting plows on at work

## 2021-01-25 NOTE — ED PROVIDER NOTE - CARE PROVIDER_API CALL
Nate Tovar  ORTHOPAEDIC SURGERY  [address], [address]  [place] [address]  Phone: [phone]  Fax: [phone]  Follow Up Time: 1-3 Days

## 2021-01-25 NOTE — ED PROVIDER NOTE - MUSCULOSKELETAL MINIMAL EXAM
+ TTP left lumbar paraspinal muscles. no midline vertebral tenderness. LE strength 5/5 bilaterally. dp pulses equal and intact.

## 2021-01-25 NOTE — ED PROVIDER NOTE - OBJECTIVE STATEMENT
44 yo male with h/o DM, HTN, HLD, obesity BIBA c/o left sided lower back pain occurred while lifting a heavy blow to put into his truck at work prior to arrival. Patient admits to having previous back pain however denies having any imaging or follow up. Pain is sharp, constant, worse with movement, nonradiating. Patient states he pain was sudden, severe and lowered himself to the ground. Denies fever, chills, chest pain, sob, abd pain, N/V, urinary sxs, flank pain. no UE/LE weakness or paresthesias, no saddle anesthesia, no bowel or bladder incontinence/retention, no prior back surgery.

## 2021-01-25 NOTE — ED PROVIDER NOTE - PATIENT PORTAL LINK FT
You can access the FollowMyHealth Patient Portal offered by [facility] by registering at the following website: [email or web address]. By joining [employer]’s FollowMyHealth portal, you will also be able to view your health information using other applications (apps) compatible with our system.

## 2021-01-25 NOTE — ED PROVIDER NOTE - PMH
CAD (coronary artery disease)    Diabetes mellitus    Hyperlipidemia    Hypertension    Obesity    Seasonal allergies    Smoking    Wegener's granulomatosis  throat, treated surgically in 4/2017, presently on Methotrexate

## 2021-01-25 NOTE — ED PROVIDER NOTE - PROGRESS NOTE DETAILS
Krish NEWTON for Dr. Lemon: 46 y/o M with hx of CAD, DM, HTN, HLD, presents to the ED BIBA c/o lower back pain after lifting snow plow on his truck today. Denies other injury. PCP: Dr. Corona in [place].   Physical exam: Vital signs normal, afebrile, no distress. Back: non-tender, no deformity, FROM intact. Extremities: atraumatic. Neuro: no deficits.  MDM: back pain, plan; x ray, pain meds, ortho f/u Reevaluated patient at bedside.  Patient feeling much improved.  Discussed the results of all diagnostic testing in ED and copies of all available reports given.   An opportunity to ask questions was provided.  Discussed the importance of prompt, close medical follow-up.  Patient will return with any changes, concerns or persistent/worsening symptoms.  Understanding of all instructions verbalized.

## 2021-01-25 NOTE — ED PROVIDER NOTE - NEUROLOGICAL, MLM
Alert and oriented, no focal deficits, no motor or sensory deficits. LE sensation equal and intact bilaterally. LE strength 5/5 bilaterally. neurovascular intact.

## 2021-02-02 ENCOUNTER — APPOINTMENT (OUTPATIENT)
Dept: RHEUMATOLOGY | Facility: CLINIC | Age: 46
End: 2021-02-02
Payer: COMMERCIAL

## 2021-02-02 DIAGNOSIS — J38.01 PARALYSIS OF VOCAL CORDS AND LARYNX, UNILATERAL: ICD-10-CM

## 2021-02-02 PROCEDURE — 99442: CPT

## 2021-02-02 NOTE — HISTORY OF PRESENT ILLNESS
[___ Month(s) Ago] : [unfilled] month(s) ago [FreeTextEntry1] : s/p Rituxan in 06/2019 - seen by ENT -stable disease with no regrowth - last rituxan infusion 06/2019\par doing well on no medication\par seen by ENT - current thrush - now on medication\par hurt his back at work last week - has been off since then improving pain

## 2021-02-02 NOTE — ASSESSMENT
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 [place]- stable after 2nd dose of Rituxan -  stable disease - no regrowth -  voice is stable - f/u with ENT\par not currently on any medications- patient to call if symptoms return\par 2.Diabetes - under the care of PCP - reports that is better controlled\par \par \par \par I reviewed previous labs results with patients.\par labs to be done at PCP - will request copy\par Diagnosis and Prognosis discussed\par Continue with current medications\par f/u 6 months\par \par This was a Telephonic encounter in which audio communication was utilized. Risks and benefits of receiving Telehealth services has been discussed with the patient. The patient has been given ample opportunity to discuss any questions regarding [facility]’s telehealth services. All of the patients questions have been answered to satisfaction.\par Verbal consent was obtain\par Provider Location: [address] [place] [address]\par Patient Location: [facility]\par Duration:  15 minutes\par

## 2021-02-02 NOTE — PHYSICAL EXAM
[General Appearance - Alert] : alert [General Appearance - In No Acute Distress] : in no acute distress [Heart Sounds Gallop] : no gallops [Edema] : there was no peripheral edema [Cervical Lymph Nodes Enlarged Posterior Bilaterally] : posterior cervical [Cervical Lymph Nodes Enlarged Anterior Bilaterally] : anterior cervical [Supraclavicular Lymph Nodes Enlarged Bilaterally] : supraclavicular [Abnormal Walk] : normal gait [Nail Clubbing] : no clubbing  or cyanosis of the fingernails [Musculoskeletal - Swelling] : no joint swelling seen [FreeTextEntry1] : as per patient report [Motor Tone] : muscle strength and tone were normal [Skin Color & Pigmentation] : normal skin color and pigmentation [Skin Turgor] : normal skin turgor [] : no rash [Impaired Insight] : insight and judgment were intact [Oriented To Time, Place, And Person] : oriented to person, place, and time [Affect] : the affect was normal

## 2021-02-09 ENCOUNTER — APPOINTMENT (OUTPATIENT)
Dept: MRI IMAGING | Facility: CLINIC | Age: 46
End: 2021-02-09

## 2021-02-13 NOTE — H&P PST ADULT - BACK
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 Tran was admitted from 1/3/2020 to 1/5/2020 for abdominal pain, which was attributed to acute diverticuliti. He was also found to be with pancytopenia s/p blood transfusion. We rechecked his labs today and his ANC>500, and platelets >75k, and we resumed treatment. Patient denies change in swallowing. He has a good appetite and taking po'; He has no change in breathing. He denies GERD symptoms.     OBJECTIVE:   VITALS  /63; HR 55 O2 Sat 96% weight 72kg  General: alert and oriented times three, not in acute distress.   lungs: he is breathing normally.   skin: no erythema within XRT field.     ASSESSMENT/PLAN:  Patient is tolerating radiation without significant side-effects. The pancytopenia is likely from patient's CLL and not from XRT.  Continue XRT as planned.    dosimetry, films reviewed.  medications reviewed and reconciled.              Electronically Signed On 01.14.2020 12:33  ___________________________________________________   Gillian Doll MD     No deformity or limitation of movement

## 2021-03-18 ENCOUNTER — EMERGENCY (EMERGENCY)
Facility: HOSPITAL | Age: 46
LOS: 1 days | Discharge: HOME | End: 2021-03-18
Attending: EMERGENCY MEDICINE | Admitting: EMERGENCY MEDICINE
Payer: COMMERCIAL

## 2021-03-18 VITALS
RESPIRATION RATE: 18 BRPM | SYSTOLIC BLOOD PRESSURE: 116 MMHG | OXYGEN SATURATION: 96 % | HEART RATE: 124 BPM | HEIGHT: 75 IN | TEMPERATURE: 103 F | DIASTOLIC BLOOD PRESSURE: 64 MMHG

## 2021-03-18 DIAGNOSIS — Z87.19 PERSONAL HISTORY OF OTHER DISEASES OF THE DIGESTIVE SYSTEM: Chronic | ICD-10-CM

## 2021-03-18 DIAGNOSIS — M31.30 WEGENER'S GRANULOMATOSIS WITHOUT RENAL INVOLVEMENT: Chronic | ICD-10-CM

## 2021-03-18 LAB
ALBUMIN SERPL ELPH-MCNC: 3.4 G/DL — SIGNIFICANT CHANGE UP (ref 3.3–5)
ALP SERPL-CCNC: 39 U/L — LOW (ref 40–120)
ALT FLD-CCNC: 32 U/L — SIGNIFICANT CHANGE UP (ref 4–41)
ANION GAP SERPL CALC-SCNC: 15 MMOL/L — HIGH (ref 7–14)
AST SERPL-CCNC: 50 U/L — HIGH (ref 4–40)
BASE EXCESS BLDV CALC-SCNC: 4.3 MMOL/L — HIGH (ref -3–2)
BILIRUB SERPL-MCNC: 0.3 MG/DL — SIGNIFICANT CHANGE UP (ref 0.2–1.2)
BLOOD GAS VENOUS - CREATININE: 1.5 MG/DL — HIGH (ref 0.5–1.3)
BLOOD GAS VENOUS COMPREHENSIVE RESULT: SIGNIFICANT CHANGE UP
BUN SERPL-MCNC: 20 MG/DL — SIGNIFICANT CHANGE UP (ref 7–23)
CALCIUM SERPL-MCNC: 9 MG/DL — SIGNIFICANT CHANGE UP (ref 8.4–10.5)
CHLORIDE BLDV-SCNC: 105 MMOL/L — SIGNIFICANT CHANGE UP (ref 96–108)
CHLORIDE SERPL-SCNC: 96 MMOL/L — LOW (ref 98–107)
CO2 SERPL-SCNC: 23 MMOL/L — SIGNIFICANT CHANGE UP (ref 22–31)
CREAT SERPL-MCNC: 1.37 MG/DL — HIGH (ref 0.5–1.3)
GAS PNL BLDV: 133 MMOL/L — LOW (ref 136–146)
GLUCOSE BLDV-MCNC: 279 MG/DL — HIGH (ref 70–99)
GLUCOSE SERPL-MCNC: 281 MG/DL — HIGH (ref 70–99)
HCO3 BLDV-SCNC: 27 MMOL/L — SIGNIFICANT CHANGE UP (ref 20–27)
HCT VFR BLD CALC: 36 % — LOW (ref 39–50)
HCT VFR BLDA CALC: 38.4 % — LOW (ref 39–51)
HGB BLD CALC-MCNC: 12.5 G/DL — LOW (ref 13–17)
HGB BLD-MCNC: 11.7 G/DL — LOW (ref 13–17)
LACTATE BLDV-MCNC: 2 MMOL/L — SIGNIFICANT CHANGE UP (ref 0.5–2)
MCHC RBC-ENTMCNC: 29.5 PG — SIGNIFICANT CHANGE UP (ref 27–34)
MCHC RBC-ENTMCNC: 32.5 GM/DL — SIGNIFICANT CHANGE UP (ref 32–36)
MCV RBC AUTO: 90.9 FL — SIGNIFICANT CHANGE UP (ref 80–100)
NRBC # BLD: 0 /100 WBCS — SIGNIFICANT CHANGE UP
NRBC # FLD: 0 K/UL — SIGNIFICANT CHANGE UP
PCO2 BLDV: 40 MMHG — LOW (ref 41–51)
PH BLDV: 7.46 — HIGH (ref 7.32–7.43)
PLATELET # BLD AUTO: 273 K/UL — SIGNIFICANT CHANGE UP (ref 150–400)
PO2 BLDV: 24 MMHG — LOW (ref 35–40)
POTASSIUM BLDV-SCNC: 4 MMOL/L — SIGNIFICANT CHANGE UP (ref 3.4–4.5)
POTASSIUM SERPL-MCNC: 4.1 MMOL/L — SIGNIFICANT CHANGE UP (ref 3.5–5.3)
POTASSIUM SERPL-SCNC: 4.1 MMOL/L — SIGNIFICANT CHANGE UP (ref 3.5–5.3)
PROT SERPL-MCNC: 7.1 G/DL — SIGNIFICANT CHANGE UP (ref 6–8.3)
RBC # BLD: 3.96 M/UL — LOW (ref 4.2–5.8)
RBC # FLD: 12.4 % — SIGNIFICANT CHANGE UP (ref 10.3–14.5)
SAO2 % BLDV: 36.9 % — LOW (ref 60–85)
SODIUM SERPL-SCNC: 134 MMOL/L — LOW (ref 135–145)
WBC # BLD: 6.26 K/UL — SIGNIFICANT CHANGE UP (ref 3.8–10.5)
WBC # FLD AUTO: 6.26 K/UL — SIGNIFICANT CHANGE UP (ref 3.8–10.5)

## 2021-03-18 PROCEDURE — 99285 EMERGENCY DEPT VISIT HI MDM: CPT

## 2021-03-18 PROCEDURE — 71045 X-RAY EXAM CHEST 1 VIEW: CPT | Mod: 26

## 2021-03-18 RX ORDER — ACETAMINOPHEN 500 MG
975 TABLET ORAL ONCE
Refills: 0 | Status: COMPLETED | OUTPATIENT
Start: 2021-03-18 | End: 2021-03-18

## 2021-03-18 RX ORDER — IBUPROFEN 200 MG
600 TABLET ORAL ONCE
Refills: 0 | Status: COMPLETED | OUTPATIENT
Start: 2021-03-18 | End: 2021-03-18

## 2021-03-18 RX ORDER — SODIUM CHLORIDE 9 MG/ML
1000 INJECTION INTRAMUSCULAR; INTRAVENOUS; SUBCUTANEOUS ONCE
Refills: 0 | Status: COMPLETED | OUTPATIENT
Start: 2021-03-18 | End: 2021-03-18

## 2021-03-18 RX ADMIN — SODIUM CHLORIDE 1000 MILLILITER(S): 9 INJECTION INTRAMUSCULAR; INTRAVENOUS; SUBCUTANEOUS at 22:57

## 2021-03-18 RX ADMIN — Medication 975 MILLIGRAM(S): at 22:57

## 2021-03-18 RX ADMIN — Medication 600 MILLIGRAM(S): at 22:57

## 2021-03-18 NOTE — ED PROVIDER NOTE - HIV OFFER
Patient called [facility] and asked that Dr Mark call the [facility] Provider to get her worked in sooner than there 1st available 6-15-17 appointment.     I did advise patient that Dr Mark will not be in Clinic until 5-15-17 and that I would check with him and see if he feels patient needs a sooner than available work in appt at the [facility].     Zena Lyon RN           Previously Declined (within the last year)

## 2021-03-18 NOTE — ED PROVIDER NOTE - PHYSICAL EXAMINATION
GEN - NAD; well appearing; A+O x3   HEAD - NC/AT   EYES- PERRL, EOMI  ENT: Airway patent, mmm  NECK: Neck supple  PULMONARY - CTA b/l, symmetric breath sounds.   CARDIAC -s1s2, RRR, no M,G,R  ABDOMEN - +BS, ND, NT, soft, no guarding, no rebound, no masses   BACK - no CVA tenderness, Normal  spine   EXTREMITIES - FROM, symmetric pulses, capillary refill < 2 seconds, no edema   SKIN - no rash or bruising   NEUROLOGIC - alert, speech clear, no focal deficits  PSYCH -nl mood/affect, nl insight.

## 2021-03-18 NOTE — ED PROVIDER NOTE - OBJECTIVE STATEMENT
45M h/o HTN, DM covid positive 3/10 presents with persistent fevers, decreased PO intake. Pt reports cough is improving, denies cp or sob. He's concerned that hes continued to have persisitent fevers, has not taken tylenol or motrin in over 12 hours. No abd pain, n/v.

## 2021-03-18 NOTE — ED ADULT NURSE NOTE - NSIMPLEMENTINTERV_GEN_ALL_ED
Implemented All Universal Safety Interventions:  [place] to call system. Call bell, personal items and telephone within reach. Instruct patient to call for assistance. Room bathroom lighting operational. Non-slip footwear when patient is off stretcher. Physically safe environment: no spills, clutter or unnecessary equipment. Stretcher in lowest position, wheels locked, appropriate side rails in place.

## 2021-03-18 NOTE — ED ADULT TRIAGE NOTE - CHIEF COMPLAINT QUOTE
COVID+ on 3/10 with worsening fever and cough. Denies chest pain, sob, dizziness. PMHx CHF, DM, HTN.

## 2021-03-18 NOTE — ED PROVIDER NOTE - CLINICAL SUMMARY MEDICAL DECISION MAKING FREE TEXT BOX
Pt with DM, HTN presents with persistent fevers with covid. Well appearing, no resp distress. Pending labs, cxr, reasses, likely dispo home with return precautions.

## 2021-03-18 NOTE — ED ADULT NURSE NOTE - OBJECTIVE STATEMENT
Received patient to [facility] for covid. A&o4, ambulatory, patient tested covid positive 3/10, presenting to ER for worsening fevers. Febrile at this time, denies chest pain, n/v/d, sob at this time but endorsing "bad cough".  Pt stating everyone at home has covid, tylenol has not been helping. Sinus tachycardia on monitor, MD Jacobs at bedside.  Right 18G AC placed, medicated as per MD orders. Breathing equal and unlabored, vitals as noted. Hx of HTN, DM and CHF. Otherwise offers no other complaints at this time.

## 2021-03-18 NOTE — ED PROVIDER NOTE - PATIENT PORTAL LINK FT
You can access the FollowMyHealth Patient Portal offered by [facility] by registering at the following website: [email or web address]. By joining [employer]’s FollowMyHealth portal, you will also be able to view your health information using other applications (apps) compatible with our system.

## 2021-03-19 VITALS
OXYGEN SATURATION: 97 % | SYSTOLIC BLOOD PRESSURE: 137 MMHG | HEART RATE: 105 BPM | TEMPERATURE: 100 F | RESPIRATION RATE: 18 BRPM | DIASTOLIC BLOOD PRESSURE: 79 MMHG

## 2021-03-20 ENCOUNTER — TRANSCRIPTION ENCOUNTER (OUTPATIENT)
Age: 46
End: 2021-03-20

## 2021-03-27 ENCOUNTER — INPATIENT (INPATIENT)
Facility: HOSPITAL | Age: 46
LOS: 1 days | Discharge: HOME | End: 2021-03-29
Attending: INTERNAL MEDICINE | Admitting: INTERNAL MEDICINE
Payer: COMMERCIAL

## 2021-03-27 VITALS
HEIGHT: 75 IN | SYSTOLIC BLOOD PRESSURE: 148 MMHG | RESPIRATION RATE: 16 BRPM | DIASTOLIC BLOOD PRESSURE: 93 MMHG | TEMPERATURE: 99 F | HEART RATE: 111 BPM | OXYGEN SATURATION: 99 %

## 2021-03-27 DIAGNOSIS — I26.99 OTHER PULMONARY EMBOLISM WITHOUT ACUTE COR PULMONALE: ICD-10-CM

## 2021-03-27 DIAGNOSIS — Z87.19 PERSONAL HISTORY OF OTHER DISEASES OF THE DIGESTIVE SYSTEM: Chronic | ICD-10-CM

## 2021-03-27 DIAGNOSIS — M31.30 WEGENER'S GRANULOMATOSIS WITHOUT RENAL INVOLVEMENT: Chronic | ICD-10-CM

## 2021-03-27 DIAGNOSIS — R09.89 OTHER SPECIFIED SYMPTOMS AND SIGNS INVOLVING THE CIRCULATORY AND RESPIRATORY SYSTEMS: ICD-10-CM

## 2021-03-27 LAB

## 2021-03-27 PROCEDURE — 71045 X-RAY EXAM CHEST 1 VIEW: CPT | Mod: 26

## 2021-03-27 PROCEDURE — 71275 CT ANGIOGRAPHY CHEST: CPT | Mod: 26

## 2021-03-27 PROCEDURE — 99291 CRITICAL CARE FIRST HOUR: CPT

## 2021-03-27 RX ORDER — ENOXAPARIN SODIUM 100 MG/ML
100 INJECTION SUBCUTANEOUS
Refills: 0 | Status: DISCONTINUED | OUTPATIENT
Start: 2021-03-28 | End: 2021-03-28

## 2021-03-27 RX ORDER — FOLIC ACID 0.8 MG
1 TABLET ORAL
Qty: 0 | Refills: 0 | DISCHARGE

## 2021-03-27 RX ORDER — INSULIN GLARGINE 100 [IU]/ML
21 INJECTION, SOLUTION SUBCUTANEOUS AT BEDTIME
Refills: 0 | Status: DISCONTINUED | OUTPATIENT
Start: 2021-03-27 | End: 2021-03-27

## 2021-03-27 RX ORDER — DIPHENHYDRAMINE HCL 50 MG
50 CAPSULE ORAL ONCE
Refills: 0 | Status: COMPLETED | OUTPATIENT
Start: 2021-03-27 | End: 2021-03-27

## 2021-03-27 RX ORDER — SPIRONOLACTONE 25 MG/1
0 TABLET, FILM COATED ORAL
Qty: 0 | Refills: 1 | DISCHARGE

## 2021-03-27 RX ORDER — DEXTROSE 50 % IN WATER 50 %
25 SYRINGE (ML) INTRAVENOUS ONCE
Refills: 0 | Status: DISCONTINUED | OUTPATIENT
Start: 2021-03-27 | End: 2021-03-29

## 2021-03-27 RX ORDER — CARVEDILOL PHOSPHATE 80 MG/1
25 CAPSULE, EXTENDED RELEASE ORAL EVERY 12 HOURS
Refills: 0 | Status: DISCONTINUED | OUTPATIENT
Start: 2021-03-27 | End: 2021-03-29

## 2021-03-27 RX ORDER — INSULIN LISPRO 100/ML
VIAL (ML) SUBCUTANEOUS AT BEDTIME
Refills: 0 | Status: DISCONTINUED | OUTPATIENT
Start: 2021-03-27 | End: 2021-03-29

## 2021-03-27 RX ORDER — INSULIN GLARGINE 100 [IU]/ML
30 INJECTION, SOLUTION SUBCUTANEOUS AT BEDTIME
Refills: 0 | Status: DISCONTINUED | OUTPATIENT
Start: 2021-03-27 | End: 2021-03-29

## 2021-03-27 RX ORDER — SACUBITRIL AND VALSARTAN 24; 26 MG/1; MG/1
1 TABLET, FILM COATED ORAL
Refills: 0 | Status: DISCONTINUED | OUTPATIENT
Start: 2021-03-27 | End: 2021-03-29

## 2021-03-27 RX ORDER — DEXTROSE 50 % IN WATER 50 %
12.5 SYRINGE (ML) INTRAVENOUS ONCE
Refills: 0 | Status: DISCONTINUED | OUTPATIENT
Start: 2021-03-27 | End: 2021-03-29

## 2021-03-27 RX ORDER — HYDRALAZINE HCL 50 MG
0 TABLET ORAL
Qty: 0 | Refills: 0 | DISCHARGE

## 2021-03-27 RX ORDER — SACUBITRIL AND VALSARTAN 24; 26 MG/1; MG/1
1 TABLET, FILM COATED ORAL
Refills: 0 | Status: DISCONTINUED | OUTPATIENT
Start: 2021-03-27 | End: 2021-03-27

## 2021-03-27 RX ORDER — ATORVASTATIN CALCIUM 80 MG/1
40 TABLET, FILM COATED ORAL AT BEDTIME
Refills: 0 | Status: DISCONTINUED | OUTPATIENT
Start: 2021-03-27 | End: 2021-03-29

## 2021-03-27 RX ORDER — INSULIN GLARGINE 100 [IU]/ML
0 INJECTION, SOLUTION SUBCUTANEOUS
Qty: 0 | Refills: 0 | DISCHARGE

## 2021-03-27 RX ORDER — SODIUM CHLORIDE 9 MG/ML
1000 INJECTION, SOLUTION INTRAVENOUS
Refills: 0 | Status: DISCONTINUED | OUTPATIENT
Start: 2021-03-27 | End: 2021-03-29

## 2021-03-27 RX ORDER — SODIUM CHLORIDE 9 MG/ML
1000 INJECTION INTRAMUSCULAR; INTRAVENOUS; SUBCUTANEOUS ONCE
Refills: 0 | Status: COMPLETED | OUTPATIENT
Start: 2021-03-27 | End: 2021-03-27

## 2021-03-27 RX ORDER — DEXTROSE 50 % IN WATER 50 %
15 SYRINGE (ML) INTRAVENOUS ONCE
Refills: 0 | Status: DISCONTINUED | OUTPATIENT
Start: 2021-03-27 | End: 2021-03-29

## 2021-03-27 RX ORDER — METOPROLOL TARTRATE 50 MG
1 TABLET ORAL
Qty: 0 | Refills: 0 | DISCHARGE

## 2021-03-27 RX ORDER — ASPIRIN/CALCIUM CARB/MAGNESIUM 324 MG
81 TABLET ORAL DAILY
Refills: 0 | Status: DISCONTINUED | OUTPATIENT
Start: 2021-03-27 | End: 2021-03-29

## 2021-03-27 RX ORDER — INSULIN LISPRO 100/ML
VIAL (ML) SUBCUTANEOUS
Refills: 0 | Status: DISCONTINUED | OUTPATIENT
Start: 2021-03-27 | End: 2021-03-29

## 2021-03-27 RX ORDER — METHOTREXATE 2.5 MG/1
8 TABLET ORAL
Qty: 0 | Refills: 0 | DISCHARGE

## 2021-03-27 RX ORDER — ENOXAPARIN SODIUM 100 MG/ML
100 INJECTION SUBCUTANEOUS ONCE
Refills: 0 | Status: COMPLETED | OUTPATIENT
Start: 2021-03-27 | End: 2021-03-27

## 2021-03-27 RX ORDER — METOPROLOL TARTRATE 50 MG
50 TABLET ORAL DAILY
Refills: 0 | Status: DISCONTINUED | OUTPATIENT
Start: 2021-03-27 | End: 2021-03-27

## 2021-03-27 RX ORDER — GLUCAGON INJECTION, SOLUTION 0.5 MG/.1ML
1 INJECTION, SOLUTION SUBCUTANEOUS ONCE
Refills: 0 | Status: DISCONTINUED | OUTPATIENT
Start: 2021-03-27 | End: 2021-03-29

## 2021-03-27 RX ORDER — CARVEDILOL PHOSPHATE 80 MG/1
25 CAPSULE, EXTENDED RELEASE ORAL EVERY 12 HOURS
Refills: 0 | Status: DISCONTINUED | OUTPATIENT
Start: 2021-03-27 | End: 2021-03-27

## 2021-03-27 RX ADMIN — ENOXAPARIN SODIUM 100 MILLIGRAM(S): 100 INJECTION SUBCUTANEOUS at 19:36

## 2021-03-27 RX ADMIN — Medication 3: at 23:07

## 2021-03-27 RX ADMIN — Medication 50 MILLIGRAM(S): at 17:08

## 2021-03-27 RX ADMIN — Medication 40 MILLIGRAM(S): at 14:18

## 2021-03-27 RX ADMIN — INSULIN GLARGINE 30 UNIT(S): 100 INJECTION, SOLUTION SUBCUTANEOUS at 23:44

## 2021-03-27 RX ADMIN — SODIUM CHLORIDE 1000 MILLILITER(S): 9 INJECTION INTRAMUSCULAR; INTRAVENOUS; SUBCUTANEOUS at 19:15

## 2021-03-27 NOTE — ED PROVIDER NOTE - ATTENDING CONTRIBUTION TO CARE
I performed a history and physical exam of the patient and discussed their management with the resident and /or advanced care provider. I reviewed the resident and /or ACP's note and agree with the documented findings and plan of care. My medical decision making and observations are found above.  Lungs clear abd soft, speaking in full sentences.

## 2021-03-27 NOTE — H&P ADULT - ATTENDING COMMENTS
Patient seen and examined on 3/28/2021, case discussed with Lionel Flores. This is a 45M with history of CAD (no stents, mild disease as per cardiac cath in 2018), "Weak Heart" (pt does not know if he has CHF but is on medications suggestive of CHF, said that his heart was functioning at 40% prior to these meds and now is functioning at 98%), DM2, Granulomatosis with polyangitis (involvement of throat, s/p surgery, immunotherapy), and GERD who presents to the hospital from his PCP's office for tachycardia. Patient was diagnosed with COVID19 PNA earlier in March and had symptoms of cough with fevers/chills. Was being managed conservatively at home. Said that his fevers/chills had resolved and his cough was improving but still present. Over the past week he started to note TINOCO which was unusual for him but thought it might be 2/2 COVID19. Denied any chest pain/palpitations with his TINOCO. Went to his PCP for a regular follow up on Saturday and there was noted to have a fast HR and was sent to [facility] for evaluation for a PE. Work up here significant for R segmental PE with persistent COVID19 PNA and new R hilar/mediastinal LAD. Lab work with anemia, elevated d-dimer, hyperglycemia, and indeterminant trops. Patient currently denies any acute complaints.   - Would c/w lovenox injections for now for patient's PE, trend trops, monitor on telemetry, check TTE  - R hilar LAD of unclear significance, patient is a former smoker and quit 1 year ago, would likely benefit from pulmonary evaluation and establishment of follow up, encouraged patient to have LAD re-evalauted when his acute issues improve  - Seems to have persistent COVID19 PNA with positive RVP swab, symptoms improving and patient saturating well on RA, currently does not meet criteria for treatment with remdesivir/dexamethasone, will monitor off these meds  - c/w cardiac medications as above  - Anemia work up as above  - Other management as above

## 2021-03-27 NOTE — H&P ADULT - PROBLEM SELECTOR PROBLEM 7
Prophylactic measure Hypertension Coronary artery disease involving native heart without angina pectoris, unspecified vessel or lesion type

## 2021-03-27 NOTE — H&P ADULT - NSICDXPASTMEDICALHX_GEN_ALL_CORE_FT
PAST MEDICAL HISTORY:  CAD (coronary artery disease)     COVID-19 COVID PCR positive on 3/10/21    Diabetes mellitus     Hyperlipidemia     Hypertension     Obesity     Seasonal allergies     Smoking     Wegener's granulomatosis throat, treated surgically in 4/2017, presently on Methotrexate     PAST MEDICAL HISTORY:  CAD (coronary artery disease)     COVID-19 COVID PCR positive on 3/10/21    Diabetes mellitus     GERD (gastroesophageal reflux disease)     Hyperlipidemia     Hypertension     Obesity     Seasonal allergies     Smoking     Wegener's granulomatosis throat, treated surgically in 4/2017, presently on Methotrexate

## 2021-03-27 NOTE — H&P ADULT - PROBLEM SELECTOR PLAN 5
BP stable 130-150s  - C/w carvedilol. Hold Hydralazine for now. Hyperglycemic to 300s since noon in ED. Takes premixed novolog 70/30 28 units in AM and 24 units before dinner, Metformin 1000mg BID, Glipizide 10mg daily.  - TDD 52 units at home  - Lantus 30 units tonight  - Correctional low dose TID AC and HS Numbness of L 2nd toe x 1 week.  Possibly peripheral neuropathy from granulomatosis with polyangiitis.  - Outpatient f/u with Rheumatologist  - If worsening, can consider checking labs: C, P-ANCA Numbness of L 2nd toe x 1 week.  Possibly peripheral neuropathy from granulomatosis with polyangiitis. Currently resolved as per patient.  - Outpatient f/u with Rheumatologist  - If worsening, can consider checking labs: C, P-ANCA

## 2021-03-27 NOTE — H&P ADULT - PROBLEM SELECTOR PLAN 6
- c/w home atorvastatin 40mg daily CAD with possible hx of CHF. Patient reports "weak heart."  - C/w Entresto  BID  - C/w Carvedilol 25mg BID  - Hold hydralazine 25mg TID for now given BP stable  - C/w ASA 81mg daily, Atorvastatin 40mg daily  - Obtain TTE  - Monitor I/Os Hyperglycemic to 300s since noon in ED. Takes premixed novolog 70/30 28 units in AM and 24 units before dinner, Metformin 1000mg BID, Glipizide 10mg daily.  - TDD 52 units at home  - Lantus 30 units tonight  - Correctional low dose TID AC and HS  - Check A1c

## 2021-03-27 NOTE — ED ADULT NURSE NOTE - NSIMPLEMENTINTERV_GEN_ALL_ED
Implemented All Universal Safety Interventions:  [place] to call system. Call bell, personal items and telephone within reach. Instruct patient to call for assistance. Room bathroom lighting operational. Non-slip footwear when patient is off stretcher. Physically safe environment: no spills, clutter or unnecessary equipment. Stretcher in lowest position, wheels locked, appropriate side rails in place.

## 2021-03-27 NOTE — H&P ADULT - PROBLEM SELECTOR PROBLEM 4
Coronary artery disease involving native heart without angina pectoris, unspecified vessel or lesion type Numbness of toes Anemia

## 2021-03-27 NOTE — H&P ADULT - NSICDXPASTSURGICALHX_GEN_ALL_CORE_FT
PAST SURGICAL HISTORY:  History of rectal abscess s/p surgery    History of vasectomy     Wegener's granulomatosis treated surgically in 4/2017

## 2021-03-27 NOTE — ED ADULT TRIAGE NOTE - CHIEF COMPLAINT QUOTE
pt dx positive covid on march 10/ pt having sob since last Saturday/ pt seen by pmd today and sent in for r/o emboli/ pt having slight sob, cough/

## 2021-03-27 NOTE — H&P ADULT - NSHPREVIEWOFSYSTEMS_GEN_ALL_CORE
General: No fevers, chills, weight change  HEENT: No headaches, acute visual changes, rhinorrhea, sore throat  CVS: No chest pain, palpitations  Resp: + cough, TINOCO  GI: No abdominal pain, nausea, vomiting, constipation, diarrhea, hematochezia, melena  : No hematuria  MSK: No joint pain or swelling  Ext: No leg pain, edema, no claudication  Skin: No rashes or itching  Heme: No easy bruising or petechiae  Neuro: + numbness in L foot 2nd digit.  No confusion, focal weakness, or loss of consciousness  Endocrine: No excessive heat or cold symptoms, polyuria, polydipsia General: No fevers, chills, weight change  HEENT: No headaches, acute visual changes, rhinorrhea, sore throat  CVS: No chest pain, palpitations  Resp: + cough, TINOCO  GI: No abdominal pain, nausea, vomiting, constipation, diarrhea, hematochezia, melena  : No hematuria, no dysuria, no frequency, no flank pain  MSK: No joint pain or swelling  Ext: No leg pain, edema, no claudication  Skin: No rashes or itching  Heme: No easy bruising or petechiae  Neuro: + numbness in L foot 2nd digit x2 weeks.  No confusion, focal weakness, or loss of consciousness  Endocrine: No excessive heat or cold symptoms, polyuria, polydipsia

## 2021-03-27 NOTE — H&P ADULT - ASSESSMENT
45M w/ hx of CAD, "heart weakness," HTN, insulin dependent T2DM, hx of Granulomatosis with polyangiitis in throat, GERD, obesity, and recent COVID-19 pneumonia (3/10) who presents with tachycardia, likely secondary to RLL segmental PE iso recent COVID19 infection and sedentary habits from recent back injury.

## 2021-03-27 NOTE — H&P ADULT - PROBLEM SELECTOR PLAN 1
RLL segmental PE likely provoked from COVID19 infection and sedentary habits from recent back injury. No previous history of VTE.  Tachycardic, no hypoxia.   - S/p Lovenox 100mg SC x1 in ED. c/w Lovenox 100mg BID  - TTE to rule out stasis  - BLE duplex to rule out DVT's  - Monitor on telemetry RLL segmental PE likely provoked from COVID19 infection and sedentary habits from recent back injury. No previous history of VTE.  Tachycardic, no hypoxia.   - S/p Lovenox 100mg SC x1 in ED. c/w Lovenox 100mg BID  - TTE to assess for R heart strain  - BLE duplex to rule out DVT's  - Monitor on telemetry  - Trop and pro-BNP unremarkable, will trend in AM

## 2021-03-27 NOTE — H&P ADULT - PROBLEM SELECTOR PROBLEM 5
Hypertension Type 2 diabetes mellitus without complication, with long-term current use of insulin Numbness of toes

## 2021-03-27 NOTE — H&P ADULT - NSHPSOCIALHISTORY_GEN_ALL_CORE
Lives at home with wife, children  Has not worked since January due to back injury    Tobacco: Quit 1 year ago  Alcohol: Once a week, 3 cups of liquor  Recreational drug use: None Lives at home with wife, children  Has not worked since January due to back injury    Tobacco: Quit 1 year ago  Alcohol: Once a week, 3 cups of liquor  Recreational drug use: None    Ind Ambulator

## 2021-03-27 NOTE — H&P ADULT - PROBLEM SELECTOR PLAN 9
DVT ppx: on full dose AC lovenox SC  Diet: Carb controlled DASH diet - c/w home atorvastatin 40mg daily

## 2021-03-27 NOTE — ED PROVIDER NOTE - NS ED ROS FT
GENERAL: No fever or chills  EYES: No change in vision  HEENT: No trouble swallowing or speaking  CARDIAC: No chest pain  PULMONARY: + intermittent cough, + exertional dyspnea.  GI: No abdominal pain, no nausea or no vomiting, no diarrhea or constipation  : No changes in urination  SKIN: No rashes  NEURO: No headache, no numbness  MSK: No joint pain  Otherwise as HPI or negative.

## 2021-03-27 NOTE — ED ADULT NURSE NOTE - OBJECTIVE STATEMENT
Pt received to spot 22 presents with SOB, advised to come to ER by PMD to r/o PE. Pt a&ox4, ambulatory at baseline, skin intact, respirations even and unlabored, sating at 100% RA, pt sinus tachy on CM. Pt denies CP at rest however reports "sometimes when deep breathing I feel pain". Pt endorses SOB on exertion. 18G IV placed in rt arm, labs drawn and sent. Pt reports diagnosed with COVID in early March. Will continue to monitor.

## 2021-03-27 NOTE — H&P ADULT - PROBLEM SELECTOR PLAN 2
PCR positive on 3/10/21, with improving TINOCO and fevers since.  However, new onset of cough x 1 week.  Multifocal pneumonia noted on CT. Initial 1st COVID PCR negative, but RVP positive for COVID.  - Repeat COVID PCR  - Keep on isolation  - No hypoxia, no indication to start remdesivir, dexamethasone  - Supportive symptomatic treatment with tessalon perles Multifocal pneumonia noted on CT and positive COVID on RVP, likely due to residual infection. More than 2 weeks from diagnosis (3/10/21).  - D/c isolation  - No hypoxia, no indication to start remdesivir, dexamethasone  - Supportive symptomatic treatment with tessalon perles  - Will check labs: LDH, Procalcitonin, CPK, Troponin, CRP, Ferritin Multifocal pneumonia noted on CT and positive COVID on RVP, likely due to residual infection. More than 2 weeks from diagnosis (3/10/21).  - No hypoxia on RA currently, no indication to start remdesivir, dexamethasone  - Supportive symptomatic treatment with tessalon perles  - Will check labs: LDH, Procalcitonin, CPK, Troponin, CRP, Ferritin  - Patient reports 2 episodes of liquid diarrhea recently, possibly 2/2 COVID19 infection, would monitor, if recurs then consider additional work up.  - Has mild ALT elevation, will trend for now

## 2021-03-27 NOTE — H&P ADULT - HISTORY OF PRESENT ILLNESS
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  Reports cough x 1 week, initially productive with yellow to brown sputum, progressively dry.  Denies chest pain, palpitations.  Admits to TINOCO and fevers since samira COVID 2 weeks ago but symptoms have been improving since.  Denies leg swelling/pain.  Denies blood in urine or stool, dark stools, easy bruising/bleeding.  Denies hx of blood clots in legs or PE.  Pt reports that he had a back injury at work in January, and since then he's been more sedentary and lying/sitting at home more during the day.  However he does report that he gets up frequently throughout the day and his back pain has been significantly improved in last several weeks. Reports began physical therapy in recent weeks.  Denies long flights/travel.  Reports mother had hx of blood clot.  Denies family hx of hypercoagulable disorders.    In ED: Afebrile, Tachycardic in 100-111, -150/60-90s, RR 16-20, SpO2 % on RA. CTA chest showed RLL segmental PE, given Lovenox 100mg SC x1.

## 2021-03-27 NOTE — H&P ADULT - NSHPPHYSICALEXAM_GEN_ALL_CORE
Vital Signs Last 24 Hrs  T(C): 37.1 (27 Mar 2021 18:26), Max: 37.3 (27 Mar 2021 16:01)  T(F): 98.8 (27 Mar 2021 18:26), Max: 99.2 (27 Mar 2021 16:01)  HR: 104 (27 Mar 2021 18:26) (101 - 111)  BP: 148/87 (27 Mar 2021 18:26) (133/62 - 149/101)  BP(mean): --  RR: 18 (27 Mar 2021 18:26) (16 - 18)  SpO2: 100% (27 Mar 2021 18:26) (99% - 100%)    Physical Exam:  Gen: Alert, well-developed, NAD  HEENT: NCAT, PERRL, EOMI, clear conjunctiva, no scleral icterus, no erythema or exudates in the oropharynx, mmm  Neck: Supple, no JVD, no LAD  CV: RRR, S1S2, no m/r/g  Resp: CTAB, normal respiratory effort  Abd: Soft, NT, ND, normal bowel sounds  Ext: no edema, no clubbing or cyanosis  Neuro: AOx3, CN2-12 grossly intact, GRIFFITH  Skin: warm, perfused Vital Signs Last 24 Hrs  T(C): 37.1 (27 Mar 2021 18:26), Max: 37.3 (27 Mar 2021 16:01)  T(F): 98.8 (27 Mar 2021 18:26), Max: 99.2 (27 Mar 2021 16:01)  HR: 104 (27 Mar 2021 18:26) (101 - 111)  BP: 148/87 (27 Mar 2021 18:26) (133/62 - 149/101)  BP(mean): --  RR: 18 (27 Mar 2021 18:26) (16 - 18)  SpO2: 100% (27 Mar 2021 18:26) (99% - 100%)    Physical Exam:  Gen: Alert, well-developed, NAD, overweight habitus  HEENT: NCAT, EOMI, clear conjunctiva, no erythema or exudates in the oropharynx, mmm  Neck: Supple, no LAD  CV: slight tachycardia, S1S2, no m/r/g  Resp: Mild crackles in R base otherwise CTAB, normal respiratory effort  Abd: Soft, NT, mildly distended  Ext: BLE symmetric, no edema, no clubbing or cyanosis  Neuro: AOx3, CN2-12 grossly intact, GRIFFITH  Skin: warm, perfused Vital Signs Last 24 Hrs  T(C): 37.1 (27 Mar 2021 18:26), Max: 37.3 (27 Mar 2021 16:01)  T(F): 98.8 (27 Mar 2021 18:26), Max: 99.2 (27 Mar 2021 16:01)  HR: 104 (27 Mar 2021 18:26) (101 - 111)  BP: 148/87 (27 Mar 2021 18:26) (133/62 - 149/101)  BP(mean): --  RR: 18 (27 Mar 2021 18:26) (16 - 18)  SpO2: 100% (27 Mar 2021 18:26) (99% - 100%)    Physical Exam:  Gen: Alert, well-developed, NAD, overweight habitus  HEENT: NCAT, EOMI, clear conjunctiva, no erythema or exudates in the oropharynx, mmm  Neck: Supple, no LAD  CV: slight tachycardia, S1S2, no m/r/g  Resp: Mild crackles in R base otherwise CTAB, normal respiratory effort  Abd: Soft, NT, mildly distended  Ext: BLE symmetric, no edema, no clubbing or cyanosis, 2+ DP/PT pulses b/l  Neuro: AOx3, CN2-12 grossly intact, GRIFFITH, no decreased sensation of L second toe currently  Skin: warm, perfused

## 2021-03-27 NOTE — H&P ADULT - PROBLEM SELECTOR PLAN 4
CAD with possible hx of CHF. Patient reports "weak heart."  - C/w Entresto  BID  - C/w Carvedilol 25mg BID  - Hold hydralazine 25mg TID for now given BP stable  - C/w ASA 81mg daily, Atorvastatin 40mg daily  - Obtain TTE  - Monitor I/Os Numbness of L 2nd toe x 1 week.  Possibly peripheral neuropathy from granulomatosis with polyangiitis.  - Outpatient f/u with Rheumatologist  - If worsening, can consider checking labs: C, P-ANCA Normocytic anemia with Hgb 12.1, baseline previously 13.  No active bleeding.  - Will check reticulocyte count and irons studies  - monitor CBC

## 2021-03-27 NOTE — ED PROVIDER NOTE - CLINICAL SUMMARY MEDICAL DECISION MAKING FREE TEXT BOX
45 Y M presenting from doctors office due to tachycardia. Low clinical concern for PE in setting of no focal symptoms, well appearing, no desaturation, improving symptoms post-covid. Common labs, D-dimer, Likely DC. 45 Y M presenting from doctors office due to tachycardia. Low clinical concern for PE in setting of no focal symptoms, well appearing, no desaturation, improving symptoms post-covid. Common labs, D-dimer, Likely DC.  Lilliam: came in with unexplained tachycardia, has sob and decreased appetite which has been getting better since dx of covid. Will check labs and consider CT scan of chest for PE.

## 2021-03-27 NOTE — H&P ADULT - NSHPLABSRESULTS_GEN_ALL_CORE
LABS: Personally reviewed labs, imaging, and ECG                          12.1   9.22  )-----------( 423      ( 27 Mar 2021 12:20 )             38.0       03-27    139  |  103  |  13  ----------------------------<  375<H>  4.5   |  24  |  1.02    Ca    9.5      27 Mar 2021 12:20    TPro  7.3  /  Alb  3.6  /  TBili  0.5  /  DBili  x   /  AST  26  /  ALT  58<H>  /  AlkPhos  65  03-27       LIVER FUNCTIONS - ( 27 Mar 2021 12:20 )  Alb: 3.6 g/dL / Pro: 7.3 g/dL / ALK PHOS: 65 U/L / ALT: 58 U/L / AST: 26 U/L / GGT: x                        PT/INR - ( 27 Mar 2021 12:20 )   PT: 14.0 sec;   INR: 1.24 ratio         PTT - ( 27 Mar 2021 12:20 )  PTT:30.6 sec    Lactate Trend            CAPILLARY BLOOD GLUCOSE                RADIOLOGY & ADDITIONAL TESTS: LABS: Personally reviewed labs, imaging, and ECG                          12.1   9.22  )-----------( 423      ( 27 Mar 2021 12:20 )             38.0       03-27    139  |  103  |  13  ----------------------------<  375<H>  4.5   |  24  |  1.02    Ca    9.5      27 Mar 2021 12:20    TPro  7.3  /  Alb  3.6  /  TBili  0.5  /  DBili  x   /  AST  26  /  ALT  58<H>  /  AlkPhos  65  03-27       LIVER FUNCTIONS - ( 27 Mar 2021 12:20 )  Alb: 3.6 g/dL / Pro: 7.3 g/dL / ALK PHOS: 65 U/L / ALT: 58 U/L / AST: 26 U/L / GGT: x           PT/INR - ( 27 Mar 2021 12:20 )   PT: 14.0 sec;   INR: 1.24 ratio    PTT - ( 27 Mar 2021 12:20 )  PTT:30.6 sec    CAPILLARY BLOOD GLUCOSE  POCT Blood Glucose.: 355 mg/dL (27 Mar 2021 23:01)      RADIOLOGY & ADDITIONAL TESTS:   < from: CT Angio Chest w/ IV Cont (03.27.21 @ 18:37) >  IMPRESSION:  Right lower lobe segmental pulmonary emboli.  Multifocal pneumonia consistent with Covid diagnosis  Right hilar lymphadenopathy.  < end of copied text >      EKG:  Sinus tachycardia   TWI in Leads I, II, III, AVF, V4-V6 LABS: Personally reviewed labs, imaging, and ECG                        12.1   9.22  )-----------( 423      ( 27 Mar 2021 12:20 )             38.0   Complete Blood Count + Automated Diff (03.27.21 @ 12:20)   Mean Cell Volume: 92.5 fL   Mean Cell Hemoglobin: 29.4 pg   Mean Cell Hemoglobin Conc: 31.8 gm/dL     03-27    139  |  103  |  13  ----------------------------<  375<H>  4.5   |  24  |  1.02    Ca    9.5      27 Mar 2021 12:20    TPro  7.3  /  Alb  3.6  /  TBili  0.5  /  DBili  x   /  AST  26  /  ALT  58<H>  /  AlkPhos  65  03-27    LIVER FUNCTIONS - ( 27 Mar 2021 12:20 )  Alb: 3.6 g/dL / Pro: 7.3 g/dL / ALK PHOS: 65 U/L / ALT: 58 U/L / AST: 26 U/L / GGT: x           PT/INR - ( 27 Mar 2021 12:20 )   PT: 14.0 sec;   INR: 1.24 ratio    PTT - ( 27 Mar 2021 12:20 )  PTT:30.6 sec    CAPILLARY BLOOD GLUCOSE  POCT Blood Glucose.: 355 mg/dL (27 Mar 2021 23:01)      RADIOLOGY & ADDITIONAL TESTS:   < from: CT Angio Chest w/ IV Cont (03.27.21 @ 18:37) >  IMPRESSION:  Right lower lobe segmental pulmonary emboli.  Multifocal pneumonia consistent with Covid diagnosis  Right hilar lymphadenopathy.  < end of copied text >      EKG:  Sinus tachycardia , Qtc 489  TWI in Leads I, II, III, AVF, V4-V6 (TWI in II, aVF slightly worse than prior EKG otherwise stable findings)

## 2021-03-27 NOTE — H&P ADULT - PROBLEM SELECTOR PROBLEM 6
Hyperlipidemia Coronary artery disease involving native heart without angina pectoris, unspecified vessel or lesion type Type 2 diabetes mellitus without complication, with long-term current use of insulin

## 2021-03-27 NOTE — H&P ADULT - PROBLEM SELECTOR PLAN 3
Hyperglycemic to 300s since noon in ED. Takes premixed novolog 70/30 28 units in AM and 24 units before dinner, Metformin 1000mg BID, Glipizide 10mg daily.  - TDD 52 units at home  - Lantus 30 units tonight  - Correctional low dose TID AC and HS Normocytic anemia with Hgb 12.1, baseline previously 13.  No active bleeding.  - Will check reticulocyte count and irons studies  - monitor CBC R hilar lymphadenopathy, possibly reactive from COVID pneumonia.  - Pulm consult in AM to assess need for workup or for follow-up outpatient with repeat imaging post-resolution of COVID

## 2021-03-27 NOTE — H&P ADULT - PROBLEM SELECTOR PROBLEM 3
Type 2 diabetes mellitus without complication, with long-term current use of insulin Anemia Hilar lymphadenopathy

## 2021-03-27 NOTE — ED PROVIDER NOTE - OBJECTIVE STATEMENT
45 Y M H/O COVID-19 on 3/10, presenting from doctor's office for concerns of tachycardia after COVID-19 course. States improved fevers since prior ER visit on 3/18, improving exertional dyspnea. Denies any calf tenderness, CP, SOB, difficulty breathing, orthopnea.

## 2021-03-28 DIAGNOSIS — R20.0 ANESTHESIA OF SKIN: ICD-10-CM

## 2021-03-28 DIAGNOSIS — R59.0 LOCALIZED ENLARGED LYMPH NODES: ICD-10-CM

## 2021-03-28 DIAGNOSIS — Z29.9 ENCOUNTER FOR PROPHYLACTIC MEASURES, UNSPECIFIED: ICD-10-CM

## 2021-03-28 DIAGNOSIS — I10 ESSENTIAL (PRIMARY) HYPERTENSION: ICD-10-CM

## 2021-03-28 DIAGNOSIS — U07.1 COVID-19: ICD-10-CM

## 2021-03-28 DIAGNOSIS — E11.9 TYPE 2 DIABETES MELLITUS WITHOUT COMPLICATIONS: ICD-10-CM

## 2021-03-28 DIAGNOSIS — I25.10 ATHEROSCLEROTIC HEART DISEASE OF NATIVE CORONARY ARTERY WITHOUT ANGINA PECTORIS: ICD-10-CM

## 2021-03-28 DIAGNOSIS — I26.93 SINGLE SUBSEGMENTAL PULMONARY EMBOLISM WITHOUT ACUTE COR PULMONALE: ICD-10-CM

## 2021-03-28 DIAGNOSIS — D64.9 ANEMIA, UNSPECIFIED: ICD-10-CM

## 2021-03-28 DIAGNOSIS — E78.5 HYPERLIPIDEMIA, UNSPECIFIED: ICD-10-CM

## 2021-03-28 LAB
A1C WITH ESTIMATED AVERAGE GLUCOSE RESULT: 10.8 % — HIGH (ref 4–5.6)
ALBUMIN SERPL ELPH-MCNC: 3.6 G/DL — SIGNIFICANT CHANGE UP (ref 3.3–5)
ALP SERPL-CCNC: 59 U/L — SIGNIFICANT CHANGE UP (ref 40–120)
ALT FLD-CCNC: 47 U/L — HIGH (ref 4–41)
ANION GAP SERPL CALC-SCNC: 13 MMOL/L — SIGNIFICANT CHANGE UP (ref 7–14)
AST SERPL-CCNC: 17 U/L — SIGNIFICANT CHANGE UP (ref 4–40)
BILIRUB DIRECT SERPL-MCNC: <0.2 MG/DL — SIGNIFICANT CHANGE UP (ref 0–0.2)
BILIRUB INDIRECT FLD-MCNC: >0.2 MG/DL — SIGNIFICANT CHANGE UP (ref 0–1)
BILIRUB SERPL-MCNC: 0.4 MG/DL — SIGNIFICANT CHANGE UP (ref 0.2–1.2)
BUN SERPL-MCNC: 15 MG/DL — SIGNIFICANT CHANGE UP (ref 7–23)
CALCIUM SERPL-MCNC: 9.3 MG/DL — SIGNIFICANT CHANGE UP (ref 8.4–10.5)
CHLORIDE SERPL-SCNC: 104 MMOL/L — SIGNIFICANT CHANGE UP (ref 98–107)
CK SERPL-CCNC: 175 U/L — SIGNIFICANT CHANGE UP (ref 30–200)
CO2 SERPL-SCNC: 22 MMOL/L — SIGNIFICANT CHANGE UP (ref 22–31)
COVID-19 SPIKE DOMAIN AB INTERP: POSITIVE
COVID-19 SPIKE DOMAIN ANTIBODY RESULT: >250 U/ML — HIGH
CREAT SERPL-MCNC: 0.92 MG/DL — SIGNIFICANT CHANGE UP (ref 0.5–1.3)
CRP SERPL-MCNC: 43 MG/L — HIGH
ESTIMATED AVERAGE GLUCOSE: 263 MG/DL — HIGH (ref 68–114)
FERRITIN SERPL-MCNC: 484 NG/ML — HIGH (ref 30–400)
GLUCOSE SERPL-MCNC: 291 MG/DL — HIGH (ref 70–99)
HCT VFR BLD CALC: 35 % — LOW (ref 39–50)
HGB BLD-MCNC: 11 G/DL — LOW (ref 13–17)
IRON SATN MFR SERPL: 27 % — SIGNIFICANT CHANGE UP (ref 14–50)
IRON SATN MFR SERPL: 45 UG/DL — SIGNIFICANT CHANGE UP (ref 45–165)
LDH SERPL L TO P-CCNC: 258 U/L — HIGH (ref 135–225)
MCHC RBC-ENTMCNC: 29.2 PG — SIGNIFICANT CHANGE UP (ref 27–34)
MCHC RBC-ENTMCNC: 31.4 GM/DL — LOW (ref 32–36)
MCV RBC AUTO: 92.8 FL — SIGNIFICANT CHANGE UP (ref 80–100)
NRBC # BLD: 0 /100 WBCS — SIGNIFICANT CHANGE UP
NRBC # FLD: 0 K/UL — SIGNIFICANT CHANGE UP
NT-PROBNP SERPL-SCNC: 322 PG/ML — HIGH
PLATELET # BLD AUTO: 366 K/UL — SIGNIFICANT CHANGE UP (ref 150–400)
POTASSIUM SERPL-MCNC: 4.1 MMOL/L — SIGNIFICANT CHANGE UP (ref 3.5–5.3)
POTASSIUM SERPL-SCNC: 4.1 MMOL/L — SIGNIFICANT CHANGE UP (ref 3.5–5.3)
PROCALCITONIN SERPL-MCNC: 0.14 NG/ML — HIGH (ref 0.02–0.1)
PROT SERPL-MCNC: 6.8 G/DL — SIGNIFICANT CHANGE UP (ref 6–8.3)
RBC # BLD: 3.77 M/UL — LOW (ref 4.2–5.8)
RBC # BLD: 3.8 M/UL — LOW (ref 4.2–5.8)
RBC # FLD: 12.9 % — SIGNIFICANT CHANGE UP (ref 10.3–14.5)
RETICS #: 83.2 K/UL — SIGNIFICANT CHANGE UP (ref 25–125)
RETICS/RBC NFR: 2.2 % — SIGNIFICANT CHANGE UP (ref 0.5–2.5)
SARS-COV-2 IGG+IGM SERPL QL IA: >250 U/ML — HIGH
SARS-COV-2 IGG+IGM SERPL QL IA: POSITIVE
SODIUM SERPL-SCNC: 139 MMOL/L — SIGNIFICANT CHANGE UP (ref 135–145)
TIBC SERPL-MCNC: 168 UG/DL — LOW (ref 220–430)
TROPONIN T, HIGH SENSITIVITY RESULT: 17 NG/L — SIGNIFICANT CHANGE UP
UIBC SERPL-MCNC: 123 UG/DL — SIGNIFICANT CHANGE UP (ref 110–370)
WBC # BLD: 8.44 K/UL — SIGNIFICANT CHANGE UP (ref 3.8–10.5)
WBC # FLD AUTO: 8.44 K/UL — SIGNIFICANT CHANGE UP (ref 3.8–10.5)

## 2021-03-28 PROCEDURE — 99223 1ST HOSP IP/OBS HIGH 75: CPT | Mod: GC

## 2021-03-28 PROCEDURE — 93306 TTE W/DOPPLER COMPLETE: CPT | Mod: 26

## 2021-03-28 PROCEDURE — 12345: CPT | Mod: NC

## 2021-03-28 PROCEDURE — 93970 EXTREMITY STUDY: CPT | Mod: 26

## 2021-03-28 RX ORDER — HYDRALAZINE HCL 50 MG
25 TABLET ORAL THREE TIMES A DAY
Refills: 0 | Status: DISCONTINUED | OUTPATIENT
Start: 2021-03-28 | End: 2021-03-29

## 2021-03-28 RX ORDER — APIXABAN 2.5 MG/1
10 TABLET, FILM COATED ORAL EVERY 12 HOURS
Refills: 0 | Status: DISCONTINUED | OUTPATIENT
Start: 2021-03-28 | End: 2021-03-29

## 2021-03-28 RX ORDER — INFLUENZA VIRUS VACCINE 15; 15; 15; 15 UG/.5ML; UG/.5ML; UG/.5ML; UG/.5ML
0.5 SUSPENSION INTRAMUSCULAR ONCE
Refills: 0 | Status: DISCONTINUED | OUTPATIENT
Start: 2021-03-28 | End: 2021-03-29

## 2021-03-28 RX ADMIN — Medication 2: at 21:56

## 2021-03-28 RX ADMIN — INSULIN GLARGINE 30 UNIT(S): 100 INJECTION, SOLUTION SUBCUTANEOUS at 21:56

## 2021-03-28 RX ADMIN — ATORVASTATIN CALCIUM 40 MILLIGRAM(S): 80 TABLET, FILM COATED ORAL at 21:56

## 2021-03-28 RX ADMIN — APIXABAN 10 MILLIGRAM(S): 2.5 TABLET, FILM COATED ORAL at 17:37

## 2021-03-28 RX ADMIN — CARVEDILOL PHOSPHATE 25 MILLIGRAM(S): 80 CAPSULE, EXTENDED RELEASE ORAL at 17:38

## 2021-03-28 RX ADMIN — Medication 3: at 08:53

## 2021-03-28 RX ADMIN — CARVEDILOL PHOSPHATE 25 MILLIGRAM(S): 80 CAPSULE, EXTENDED RELEASE ORAL at 02:53

## 2021-03-28 RX ADMIN — Medication 3: at 17:37

## 2021-03-28 RX ADMIN — SACUBITRIL AND VALSARTAN 1 TABLET(S): 24; 26 TABLET, FILM COATED ORAL at 17:38

## 2021-03-28 RX ADMIN — SACUBITRIL AND VALSARTAN 1 TABLET(S): 24; 26 TABLET, FILM COATED ORAL at 05:40

## 2021-03-28 RX ADMIN — Medication 25 MILLIGRAM(S): at 21:56

## 2021-03-28 RX ADMIN — Medication 81 MILLIGRAM(S): at 11:55

## 2021-03-28 RX ADMIN — ENOXAPARIN SODIUM 100 MILLIGRAM(S): 100 INJECTION SUBCUTANEOUS at 05:40

## 2021-03-28 RX ADMIN — Medication 5: at 11:55

## 2021-03-28 NOTE — PROGRESS NOTE ADULT - PROBLEM SELECTOR PLAN 3
R hilar lymphadenopathy, possibly reactive from COVID pneumonia.  - Pulm consult in AM to assess need for workup or for follow-up outpatient with repeat imaging post-resolution of COVID

## 2021-03-28 NOTE — PROGRESS NOTE ADULT - PROBLEM SELECTOR PLAN 6
Hyperglycemic to 300s since noon in ED. Takes premixed novolog 70/30 28 units in AM and 24 units before dinner, Metformin 1000mg BID, Glipizide 10mg daily.  - TDD 52 units at home  - Lantus 30 units tonight  - Correctional low dose TID AC and HS  - Check A1c

## 2021-03-28 NOTE — PROGRESS NOTE ADULT - PROBLEM SELECTOR PLAN 8
BP stable 130-150s  - C/w carvedilol. Hold Hydralazine for now. BP stable 130-150s  - C/w carvedilol.  - restart hydralazine.

## 2021-03-28 NOTE — PROGRESS NOTE ADULT - PROBLEM SELECTOR PLAN 1
RLL segmental PE likely provoked from COVID19 infection and sedentary habits from recent back injury. No previous history of VTE.  Tachycardic, no hypoxia.   - S/p Lovenox 100mg SC x1 in ED. c/w Lovenox 100mg BID  - TTE with global LVDF. EF 40%. no RV strain.   - BLE duplex to rule out DVT's  - monitor on tele, NSVT overnight.

## 2021-03-28 NOTE — PROGRESS NOTE ADULT - PROBLEM SELECTOR PLAN 2
Multifocal pneumonia noted on CT and positive COVID on RVP, likely due to residual infection. More than 2 weeks from diagnosis (3/10/21).  - No hypoxia on RA currently, no indication to start remdesivir, dexamethasone  - Supportive symptomatic treatment with tessalon perles  - Will check labs: LDH, Procalcitonin, CPK, Troponin, CRP, Ferritin  - Patient reports 2 episodes of liquid diarrhea recently, possibly 2/2 COVID19 infection, would monitor, if recurs then consider additional work up.  - Has mild ALT elevation, will trend for now

## 2021-03-28 NOTE — PROGRESS NOTE ADULT - PROBLEM SELECTOR PLAN 7
CAD with possible hx of CHF. Patient reports "weak heart." Has TINOCO but denies any chest pain/palpitations, TINOCO likely in setting of COVID19 PNA and PE. EKG seems stable when compared to prior from June 20 2018.  - C/w Entresto  BID  - C/w Carvedilol 25mg BID  - Hold hydralazine 25mg TID for now given BP stable  - C/w ASA 81mg daily, Atorvastatin 40mg daily  - Obtain TTE  - Monitor I/Os CAD with possible hx of CHF. Patient reports "weak heart." Has TINOCO but denies any chest pain/palpitations, TINOCO likely in setting of COVID19 PNA and PE. EKG seems stable when compared to prior from June 20 2018.  - C/w Entresto  BID  - C/w Carvedilol 25mg BID  - C/w ASA 81mg daily, Atorvastatin 40mg daily  - echo with global LVDF.   - Monitor I/Os  - restart hydralazine.

## 2021-03-28 NOTE — PROGRESS NOTE ADULT - PROBLEM SELECTOR PLAN 5
Numbness of L 2nd toe x 1 week.  Possibly peripheral neuropathy from granulomatosis with polyangiitis. Currently resolved as per patient.  - Outpatient f/u with Rheumatologist  - If worsening, can consider checking labs: C, P-ANCA

## 2021-03-28 NOTE — PROVIDER CONTACT NOTE (OTHER) - ASSESSMENT
Patient asymptomatic - denies chest pain, shortness of breath. Patient resting in bed with no s/s of distress.

## 2021-03-28 NOTE — PROGRESS NOTE ADULT - PROBLEM SELECTOR PLAN 4
Normocytic anemia with Hgb 12.1, baseline previously 13.  No active bleeding.  - Will check reticulocyte count and irons studies  - monitor CBC

## 2021-03-28 NOTE — PROVIDER CONTACT NOTE (OTHER) - BACKGROUND
45 year old male admitted for other pulmonary embolism without acute cor pulmonale. PMH DM2, HTN, obesity, CAD. COVID positive.
no

## 2021-03-28 NOTE — PATIENT PROFILE ADULT - DEAF OR HARD OF HEARING?
51M w/ PMH of HTN, HLD, presenting with palpitations, "skipped beats," chest tightness with radiation to left shoulder/arm since this morning.  Patient states that he was sitting down when this started, resolved without intervention. 51M w/ PMH of HTN, HLD, presenting with palpitations, "skipped beats," chest tightness with radiation to left shoulder/arm since this morning.  Patient states that he was sitting down when this started, resolved without intervention.  reports left arm pain a few weeks ago but did not think much of it.  Also with some dyspnea and lightheadedness.  Had an angiogram done in 2016 which showed partial stenosis, but did not have any stents placed.     Cards- Dr. Hiram Macario  PMD- Chino Mason no

## 2021-03-28 NOTE — PATIENT PROFILE ADULT - IS THERE A SUSPICION OF ABUSE/NEGLIGENCE?
Patient sleeping in room.  When not given food patient comes out yells and screams and then goes back in his room and goes back to sleep.  Dose of Zyprexa is still at the bedside offered to give it when needed but so far patient does not require any further medication.  Patient will be signed out to the oncoming physician at 11 PM patient is still pending bed assignment due to need for higher level of care patient is still awaiting bed assignment by central intake.  Was seen and assessed by Inga from DEC.  Awaiting central intake bed assignment patient on a hold     Larry Toth MD  11/02/19 [id number]     no

## 2021-03-29 ENCOUNTER — TRANSCRIPTION ENCOUNTER (OUTPATIENT)
Age: 46
End: 2021-03-29

## 2021-03-29 VITALS
HEART RATE: 100 BPM | RESPIRATION RATE: 18 BRPM | TEMPERATURE: 98 F | SYSTOLIC BLOOD PRESSURE: 150 MMHG | DIASTOLIC BLOOD PRESSURE: 95 MMHG | OXYGEN SATURATION: 96 %

## 2021-03-29 LAB
ANION GAP SERPL CALC-SCNC: 10 MMOL/L — SIGNIFICANT CHANGE UP (ref 7–14)
BUN SERPL-MCNC: 18 MG/DL — SIGNIFICANT CHANGE UP (ref 7–23)
CALCIUM SERPL-MCNC: 9.1 MG/DL — SIGNIFICANT CHANGE UP (ref 8.4–10.5)
CHLORIDE SERPL-SCNC: 105 MMOL/L — SIGNIFICANT CHANGE UP (ref 98–107)
CO2 SERPL-SCNC: 25 MMOL/L — SIGNIFICANT CHANGE UP (ref 22–31)
CREAT SERPL-MCNC: 1.02 MG/DL — SIGNIFICANT CHANGE UP (ref 0.5–1.3)
GLUCOSE SERPL-MCNC: 207 MG/DL — HIGH (ref 70–99)
HCT VFR BLD CALC: 35.9 % — LOW (ref 39–50)
HGB BLD-MCNC: 11.3 G/DL — LOW (ref 13–17)
MAGNESIUM SERPL-MCNC: 1.9 MG/DL — SIGNIFICANT CHANGE UP (ref 1.6–2.6)
MCHC RBC-ENTMCNC: 29.2 PG — SIGNIFICANT CHANGE UP (ref 27–34)
MCHC RBC-ENTMCNC: 31.5 GM/DL — LOW (ref 32–36)
MCV RBC AUTO: 92.8 FL — SIGNIFICANT CHANGE UP (ref 80–100)
NRBC # BLD: 0 /100 WBCS — SIGNIFICANT CHANGE UP
NRBC # FLD: 0 K/UL — SIGNIFICANT CHANGE UP
PHOSPHATE SERPL-MCNC: 3.2 MG/DL — SIGNIFICANT CHANGE UP (ref 2.5–4.5)
PLATELET # BLD AUTO: 350 K/UL — SIGNIFICANT CHANGE UP (ref 150–400)
POTASSIUM SERPL-MCNC: 4 MMOL/L — SIGNIFICANT CHANGE UP (ref 3.5–5.3)
POTASSIUM SERPL-SCNC: 4 MMOL/L — SIGNIFICANT CHANGE UP (ref 3.5–5.3)
RBC # BLD: 3.87 M/UL — LOW (ref 4.2–5.8)
RBC # FLD: 12.9 % — SIGNIFICANT CHANGE UP (ref 10.3–14.5)
SODIUM SERPL-SCNC: 140 MMOL/L — SIGNIFICANT CHANGE UP (ref 135–145)
WBC # BLD: 6.22 K/UL — SIGNIFICANT CHANGE UP (ref 3.8–10.5)
WBC # FLD AUTO: 6.22 K/UL — SIGNIFICANT CHANGE UP (ref 3.8–10.5)

## 2021-03-29 PROCEDURE — 99239 HOSP IP/OBS DSCHRG MGMT >30: CPT

## 2021-03-29 RX ORDER — APIXABAN 2.5 MG/1
1 TABLET, FILM COATED ORAL
Qty: 60 | Refills: 0
Start: 2021-03-29 | End: 2021-04-27

## 2021-03-29 RX ORDER — TADALAFIL 10 MG/1
0 TABLET, FILM COATED ORAL
Qty: 0 | Refills: 0 | DISCHARGE

## 2021-03-29 RX ORDER — INSULIN LISPRO 100/ML
3 VIAL (ML) SUBCUTANEOUS
Refills: 0 | Status: DISCONTINUED | OUTPATIENT
Start: 2021-03-29 | End: 2021-03-29

## 2021-03-29 RX ORDER — RIVAROXABAN 15 MG-20MG
1 KIT ORAL
Qty: 1 | Refills: 0
Start: 2021-03-29

## 2021-03-29 RX ORDER — APIXABAN 2.5 MG/1
2 TABLET, FILM COATED ORAL
Qty: 24 | Refills: 0
Start: 2021-03-29 | End: 2021-04-03

## 2021-03-29 RX ADMIN — Medication 3: at 17:54

## 2021-03-29 RX ADMIN — SACUBITRIL AND VALSARTAN 1 TABLET(S): 24; 26 TABLET, FILM COATED ORAL at 17:55

## 2021-03-29 RX ADMIN — Medication 2: at 09:46

## 2021-03-29 RX ADMIN — Medication 3 UNIT(S): at 17:54

## 2021-03-29 RX ADMIN — CARVEDILOL PHOSPHATE 25 MILLIGRAM(S): 80 CAPSULE, EXTENDED RELEASE ORAL at 17:55

## 2021-03-29 RX ADMIN — APIXABAN 10 MILLIGRAM(S): 2.5 TABLET, FILM COATED ORAL at 05:51

## 2021-03-29 RX ADMIN — SACUBITRIL AND VALSARTAN 1 TABLET(S): 24; 26 TABLET, FILM COATED ORAL at 05:59

## 2021-03-29 RX ADMIN — Medication 25 MILLIGRAM(S): at 12:29

## 2021-03-29 RX ADMIN — Medication 25 MILLIGRAM(S): at 05:51

## 2021-03-29 RX ADMIN — CARVEDILOL PHOSPHATE 25 MILLIGRAM(S): 80 CAPSULE, EXTENDED RELEASE ORAL at 05:50

## 2021-03-29 RX ADMIN — Medication 81 MILLIGRAM(S): at 12:29

## 2021-03-29 RX ADMIN — Medication 2: at 12:29

## 2021-03-29 NOTE — PROGRESS NOTE ADULT - PROBLEM SELECTOR PLAN 4
Normocytic anemia with Hgb 12.1, baseline previously 13.  No active bleeding.  - Will check reticulocyte count and irons studies  - monitor CBC Multifocal pneumonia noted on CT and positive COVID on RVP, likely due to residual infection. More than 2 weeks from diagnosis (3/10/21).  - No hypoxia on RA currently, no indication to start remdesivir, dexamethasone  - Supportive symptomatic treatment with tessalon perles  - Will check labs: LDH, Procalcitonin, CPK, Troponin, CRP, Ferritin  - Patient reports 2 episodes of liquid diarrhea recently, possibly 2/2 COVID19 infection, would monitor, if recurs then consider additional work up.  - Has mild ALT elevation, will trend for now

## 2021-03-29 NOTE — PROGRESS NOTE ADULT - PROBLEM SELECTOR PLAN 7
CAD with possible hx of CHF. Patient reports "weak heart." Has TINOCO but denies any chest pain/palpitations, TINOCO likely in setting of COVID19 PNA and PE. EKG seems stable when compared to prior from June 20 2018.  - C/w Entresto  BID  - C/w Carvedilol 25mg BID  - C/w ASA 81mg daily, Atorvastatin 40mg daily  - echo with global LVDF.   - Monitor I/Os  - restart hydralazine. - Numbness of L 2nd toe x 1 week.  Possibly peripheral neuropathy from DM A1c-10.8 Currently resolved as per patient.

## 2021-03-29 NOTE — DISCHARGE NOTE NURSING/CASE MANAGEMENT/SOCIAL WORK - NSDCFUADDAPPT_GEN_ALL_CORE_FT
Follow up with your primary care doctor within one week of discharge. If you do not have one, you can call the medicine clinic at [phone] to make an appointment.    Please follow up with your cardiologist, Dr. MELLY Overton, within one week of discharge and call [phone] to arrange appointment.     Please follow up with your endocrinologist, Dr. IRIS Meng, within one week of discharge and call [phone] to arrange follow up appointment to discuss converting premix 70/30 insulin to basal bolus insulin.

## 2021-03-29 NOTE — PROGRESS NOTE ADULT - PROBLEM SELECTOR PROBLEM 3
Hilar lymphadenopathy Type 2 diabetes mellitus without complication, with long-term current use of insulin

## 2021-03-29 NOTE — PROGRESS NOTE ADULT - PROBLEM SELECTOR PLAN 1
RLL segmental PE likely provoked from COVID19 infection and sedentary habits from recent back injury. No previous history of VTE.  Tachycardic, no hypoxia.   - S/p Lovenox 100mg SC x1 in ED. c/w Lovenox 100mg BID  - TTE with global LVDF. EF 40%. no RV strain.   - BLE duplex to rule out DVT's  - change to eliquis if covered RLL segmental PE likely provoked from COVID19 infection and sedentary habits from recent back injury. No previous history of VTE.  Tachycardic, no hypoxia.   - S/p Lovenox 100mg SC x1 in ED. c/w Lovenox 100mg BID  - TTE with global LVDF. EF 40%. no RV strain.   - BLE duplex to rule out DVT's  - change to eliquis if covered. if not then can conisder Xarelto

## 2021-03-29 NOTE — PROGRESS NOTE ADULT - PROBLEM SELECTOR PROBLEM 1
Single subsegmental pulmonary embolism without acute cor pulmonale
Single subsegmental pulmonary embolism without acute cor pulmonale

## 2021-03-29 NOTE — PROGRESS NOTE ADULT - PROBLEM SELECTOR PLAN 8
BP stable 130-150s  - C/w carvedilol.  - restart hydralazine. BP stable 130-150s  - C/w carvedilol.  - hydralazine. 25 TID

## 2021-03-29 NOTE — PROGRESS NOTE ADULT - PROBLEM SELECTOR PROBLEM 7
Coronary artery disease involving native heart without angina pectoris, unspecified vessel or lesion type Numbness of toes

## 2021-03-29 NOTE — PROGRESS NOTE ADULT - PROBLEM SELECTOR PROBLEM 2
COVID-19 Coronary artery disease involving native heart without angina pectoris, unspecified vessel or lesion type

## 2021-03-29 NOTE — DISCHARGE NOTE PROVIDER - NSDCFUSCHEDAPPT_GEN_ALL_CORE_FT
MACK DAY ; 04/15/2021 ; [facility]  Opd MACK Echols ; 06/17/2021 ; [facility] [address] [place] [address]

## 2021-03-29 NOTE — PROGRESS NOTE ADULT - PROBLEM SELECTOR PLAN 3
R hilar lymphadenopathy, possibly reactive from COVID pneumonia.  - Pulm consult in AM to assess need for workup or for follow-up outpatient with repeat imaging post-resolution of COVID - Hyperglycemic to 200's  since noon in ED. Takes premixed novolog 70/30 28 units in AM and 24 units before dinner, Metformin 1000mg BID, Glipizide 10mg daily.  - TDD 52 units at home  - Lantus 30 units add 3 U ademalog with meals   - Correctional low dose TID AC and HS  - Check A1c-10.8  - has outpt endocrinologist Dr. Tammie Mirza.

## 2021-03-29 NOTE — DISCHARGE NOTE PROVIDER - CARE PROVIDER_API CALL
Karla Corona  [facility]  [address] [place] [address]  Phone: [phone]  Fax: [phone]  Follow Up Time:     Prasanna Overton  CARDIOVASCULAR DISEASE  [address] [place] [address]  Phone: [phone]  Fax: [phone]  Follow Up Time:    Karla Corona  [facility] MEDICINE  [address] [place] [address]  Phone: [phone]  Fax: [phone]  Follow Up Time:     Prasanna Overton  CARDIOVASCULAR DISEASE  [address] [place] [address]  Phone: [phone]  Fax: [phone]  Follow Up Time:     Tammie Mirza  ENDOCRINOLOGY/METAB/DIABETES  [address] [place] [address]  Phone: [phone]  Fax: [phone]  Follow Up Time:

## 2021-03-29 NOTE — PROGRESS NOTE ADULT - PROBLEM SELECTOR PLAN 6
Hyperglycemic to 300s since noon in ED. Takes premixed novolog 70/30 28 units in AM and 24 units before dinner, Metformin 1000mg BID, Glipizide 10mg daily.  - TDD 52 units at home  - Lantus 30 units add 3 U ademalog with meals   - Correctional low dose TID AC and HS  - Check A1c-10.8  - has outpt endocrinologist Normocytic anemia with Hgb 12.1, baseline previously 13.  No active bleeding.  - Fe studies reviewed. mildly elevated ferritin in setting of recent COVID infection   - no overt signs of bleeding   - monitor CBC

## 2021-03-29 NOTE — PROGRESS NOTE ADULT - SUBJECTIVE AND OBJECTIVE BOX
[facility] Division of Hospital Medicine  Vince Dykes MD  In House Pager [phone]    Patient is a 45y old  Male who presents with a chief complaint of Tachycardia, PE (27 Mar 2021 21:57)      SUBJECTIVE / OVERNIGHT EVENTS:  No overnight events. Labs and vitals reviewed.  Patient seen and examined at bedside, no acute complaints.  No fever, no chills, no SOB, no CP, no n/v/d, no abd pain, no dysuria      MEDICATIONS  (STANDING):  apixaban 10 milliGRAM(s) Oral every 12 hours  aspirin enteric coated 81 milliGRAM(s) Oral daily  atorvastatin 40 milliGRAM(s) Oral at bedtime  carvedilol 25 milliGRAM(s) Oral every 12 hours  dextrose 40% Gel 15 Gram(s) Oral once  dextrose 5%. 1000 milliLiter(s) (50 mL/Hr) IV Continuous <Continuous>  dextrose 5%. 1000 milliLiter(s) (100 mL/Hr) IV Continuous <Continuous>  dextrose 50% Injectable 25 Gram(s) IV Push once  dextrose 50% Injectable 12.5 Gram(s) IV Push once  dextrose 50% Injectable 25 Gram(s) IV Push once  glucagon  Injectable 1 milliGRAM(s) IntraMuscular once  influenza   Vaccine 0.5 milliLiter(s) IntraMuscular once  insulin glargine Injectable (LANTUS) 30 Unit(s) SubCutaneous at bedtime  insulin lispro (ADMELOG) corrective regimen sliding scale   SubCutaneous three times a day before meals  insulin lispro (ADMELOG) corrective regimen sliding scale   SubCutaneous at bedtime  sacubitril 97 mG/valsartan 103 mG 1 Tablet(s) Oral two times a day    MEDICATIONS  (PRN):    CAPILLARY BLOOD GLUCOSE      POCT Blood Glucose.: 384 mg/dL (28 Mar 2021 11:26)  POCT Blood Glucose.: 254 mg/dL (28 Mar 2021 08:28)  POCT Blood Glucose.: 355 mg/dL (27 Mar 2021 23:01)    I&O's Summary    28 Mar 2021 07:01  -  28 Mar 2021 16:24  --------------------------------------------------------  IN: 200 mL / OUT: 0 mL / NET: 200 mL        PHYSICAL EXAM:  Vital Signs Last 24 Hrs  T(C): 36.8 (28 Mar 2021 09:01), Max: 37.2 (28 Mar 2021 00:30)  T(F): 98.2 (28 Mar 2021 09:01), Max: 98.9 (28 Mar 2021 00:30)  HR: 98 (28 Mar 2021 09:01) (96 - 104)  BP: 156/95 (28 Mar 2021 09:01) (131/76 - 158/95)  BP(mean): --  RR: 18 (28 Mar 2021 09:01) (18 - 20)  SpO2: 94% (28 Mar 2021 09:01) (94% - 100%)    Gen: NAD; resting in bed.  Pulm: no respiratory distress; CTA b/l; no wheezing  Cards: RRR, nl S1/S2; no obvious murmurs  Abd: soft; NT on exam  Ext: no cyanosis; no edema  Skin: no rash; no cyanosis    LABS:                        11.0   8.44  )-----------( 366      ( 28 Mar 2021 06:56 )             35.0     03-28    139  |  104  |  15  ----------------------------<  291<H>  4.1   |  22  |  0.92    Ca    9.3      28 Mar 2021 06:56    TPro  6.8  /  Alb  3.6  /  TBili  0.4  /  DBili  <0.2  /  AST  17  /  ALT  47<H>  /  AlkPhos  59  03-28    PT/INR - ( 27 Mar 2021 12:20 )   PT: 14.0 sec;   INR: 1.24 ratio         PTT - ( 27 Mar 2021 12:20 )  PTT:30.6 sec  CARDIAC MARKERS ( 28 Mar 2021 06:56 )  x     / x     / 175 U/L / x     / x                RADIOLOGY & ADDITIONAL TESTS:  Results Reviewed: Y  Imaging Personally Reviewed: Y  Electrocardiogram Personally Reviewed: Y    COORDINATION OF CARE:  Care Discussed with Consultants/Other Providers [Y/N]: Y  Prior or Outpatient Records Reviewed [Y/N]: Y  
Patient is a 45y old  Male who presents with a chief complaint of Tachycardia, PE (28 Mar 2021 16:23)      SUBJECTIVE / OVERNIGHT EVENTS: Pt in NAD   ADDITIONAL REVIEW OF SYSTEMS: denies CP/SOB     MEDICATIONS  (STANDING):  apixaban 10 milliGRAM(s) Oral every 12 hours  aspirin enteric coated 81 milliGRAM(s) Oral daily  atorvastatin 40 milliGRAM(s) Oral at bedtime  carvedilol 25 milliGRAM(s) Oral every 12 hours  dextrose 40% Gel 15 Gram(s) Oral once  dextrose 5%. 1000 milliLiter(s) (50 mL/Hr) IV Continuous <Continuous>  dextrose 5%. 1000 milliLiter(s) (100 mL/Hr) IV Continuous <Continuous>  dextrose 50% Injectable 25 Gram(s) IV Push once  dextrose 50% Injectable 12.5 Gram(s) IV Push once  dextrose 50% Injectable 25 Gram(s) IV Push once  glucagon  Injectable 1 milliGRAM(s) IntraMuscular once  hydrALAZINE 25 milliGRAM(s) Oral three times a day  influenza   Vaccine 0.5 milliLiter(s) IntraMuscular once  insulin glargine Injectable (LANTUS) 30 Unit(s) SubCutaneous at bedtime  insulin lispro (ADMELOG) corrective regimen sliding scale   SubCutaneous three times a day before meals  insulin lispro (ADMELOG) corrective regimen sliding scale   SubCutaneous at bedtime  sacubitril 97 mG/valsartan 103 mG 1 Tablet(s) Oral two times a day    MEDICATIONS  (PRN):      CAPILLARY BLOOD GLUCOSE      POCT Blood Glucose.: 227 mg/dL (29 Mar 2021 12:23)  POCT Blood Glucose.: 215 mg/dL (29 Mar 2021 09:37)  POCT Blood Glucose.: 301 mg/dL (28 Mar 2021 21:13)  POCT Blood Glucose.: 276 mg/dL (28 Mar 2021 17:13)    I&O's Summary    28 Mar 2021 07:01  -  29 Mar 2021 07:00  --------------------------------------------------------  IN: 450 mL / OUT: 0 mL / NET: 450 mL    29 Mar 2021 07:01  -  29 Mar 2021 14:18  --------------------------------------------------------  IN: 200 mL / OUT: 0 mL / NET: 200 mL        PHYSICAL EXAM:  Vital Signs Last 24 Hrs  T(C): 36.3 (29 Mar 2021 09:49), Max: 37.2 (29 Mar 2021 05:47)  T(F): 97.4 (29 Mar 2021 09:49), Max: 99 (29 Mar 2021 05:47)  HR: 88 (29 Mar 2021 12:28) (87 - 96)  BP: 151/88 (29 Mar 2021 12:28) (131/86 - 158/98)  BP(mean): --  RR: 18 (29 Mar 2021 12:28) (18 - 18)  SpO2: 96% (29 Mar 2021 12:28) (94% - 97%)  CONSTITUTIONAL: NAD  EYES:  conjunctiva and sclera clear  ENMT: Moist oral mucosa,  NECK: Supple,   RESPIRATORY: Normal respiratory effort; lungs are clear to auscultation bilaterally  CARDIOVASCULAR: Regular rate and rhythm, normal S1 and S2  ABDOMEN: Nontender to palpation, normoactive bowel sounds, no rebound/guarding; MUSCULOSKELETAL:  moving all ext  PSYCH: A+O to person, place, and time; affect appropriate      LABS:                        11.3   6.22  )-----------( 350      ( 29 Mar 2021 06:34 )             35.9     03-29    140  |  105  |  18  ----------------------------<  207<H>  4.0   |  25  |  1.02    Ca    9.1      29 Mar 2021 06:34  Phos  3.2     03-29  Mg     1.9     03-29    TPro  6.8  /  Alb  3.6  /  TBili  0.4  /  DBili  <0.2  /  AST  17  /  ALT  47<H>  /  AlkPhos  59  03-28      CARDIAC MARKERS ( 28 Mar 2021 06:56 )  x     / x     / 175 U/L / x     / x                RADIOLOGY & ADDITIONAL TESTS:  Results Reviewed:   Imaging Personally Reviewed:  Electrocardiogram Personally Reviewed:    COORDINATION OF CARE:  Care Discussed with Consultants/Other Providers [Y/N]:  Prior or Outpatient Records Reviewed [Y/N]:

## 2021-03-29 NOTE — PROGRESS NOTE ADULT - ASSESSMENT
45M w/ hx of CAD, "heart weakness," HTN, insulin dependent T2DM, hx of Granulomatosis with polyangiitis in throat, GERD, obesity, and recent COVID-19 pneumonia (3/10) who presents with tachycardia, likely secondary to RLL segmental PE iso recent COVID19 infection and sedentary habits from recent back injury. no signs of RV strain or further LE DVT. 
45M w/ hx of CAD, "heart weakness," HTN, insulin dependent T2DM, hx of Granulomatosis with polyangiitis in throat, GERD, obesity, and recent COVID-19 pneumonia (3/10) who presents with tachycardia, likely secondary to RLL segmental PE iso recent COVID19 infection and sedentary habits from recent back injury. no signs of RV strain or further LE DVT.

## 2021-03-29 NOTE — DISCHARGE NOTE PROVIDER - NSDCFUADDAPPT_GEN_ALL_CORE_FT
Follow up with your primary care doctor within one week of discharge. If you do not have one, you can call the medicine clinic at [phone] to make an appointment.    Please follow up with your cardiologist, Dr. MELLY Overton, within one week of discharge and call [phone] to arrange appointment.     Please follow up with your endocrinologist, Dr. IRIS Meng, within one week of discharge and call [phone] to arrange follow up appointment to discuss converting premix 70/30 insulin to basal bolus insulin.

## 2021-03-29 NOTE — DISCHARGE NOTE PROVIDER - PROVIDER TOKENS
PROVIDER:[TOKEN:[[id number]]],PROVIDER:[TOKEN:[[id number]]] PROVIDER:[TOKEN:[[id number]]],PROVIDER:[TOKEN:[[id number]]],PROVIDER:[TOKEN:[[id number]]]

## 2021-03-29 NOTE — PROGRESS NOTE ADULT - PROBLEM SELECTOR PLAN 10
DVT ppx: on full dose AC lovenox SC  Diet: Carb controlled DASH diet
DVT ppx: on full dose AC lovenox SC  Diet: Carb controlled DASH diet

## 2021-03-29 NOTE — PROGRESS NOTE ADULT - PROBLEM SELECTOR PLAN 2
Multifocal pneumonia noted on CT and positive COVID on RVP, likely due to residual infection. More than 2 weeks from diagnosis (3/10/21).  - No hypoxia on RA currently, no indication to start remdesivir, dexamethasone  - Supportive symptomatic treatment with tessalon perles  - Will check labs: LDH, Procalcitonin, CPK, Troponin, CRP, Ferritin  - Patient reports 2 episodes of liquid diarrhea recently, possibly 2/2 COVID19 infection, would monitor, if recurs then consider additional work up.  - Has mild ALT elevation, will trend for now CAD with possible hx of CHF. Patient reports "weak heart." Has TINOCO but denies any chest pain/palpitations, TINOCO likely in setting of COVID19 PNA and PE. EKG seems stable when compared to prior from June 20 2018.  - C/w Entresto  BID  - C/w Carvedilol 25mg BID  - C/w ASA 81mg daily, Atorvastatin 40mg daily  - echo with global LVDF.   - Monitor I/Os  - restart hydralazine.  - will verify low EF if stable will need outpt f/u with cardiology

## 2021-03-29 NOTE — PROGRESS NOTE ADULT - PROBLEM SELECTOR PLAN 5
- Numbness of L 2nd toe x 1 week.  Possibly peripheral neuropathy from DM A1c-10.8 Currently resolved as per patient.    - If worsening, can consider checking labs: C, P-ANCA R hilar lymphadenopathy, possibly reactive from COVID pneumonia.  - Pulm consult in AM to assess need for workup or for follow-up outpatient with repeat imaging post-resolution of COVID

## 2021-03-29 NOTE — DISCHARGE NOTE PROVIDER - NSDCCPCAREPLAN_GEN_ALL_CORE_FT
PRINCIPAL DISCHARGE DIAGNOSIS  Diagnosis: Pulmonary embolism  Assessment and Plan of Treatment:        PRINCIPAL DISCHARGE DIAGNOSIS  Diagnosis: Pulmonary embolism  Assessment and Plan of Treatment: You presented to the hospital with shortness of breath and a CAT Scan was done.  You were diagnosed with a pulmonary embolism (right lower lobe) while admitted to the hospital, likely provoked from COVID19 infection and sedentary habits from recent back injury.  A doppler of your legs was negative for any deep vein thrombosis (DVT).  You were started on blood thinners while in the hospital, please continue your Xarelto as prescribed and follow up with your Primary Care Doctor and Cardiologist within one week of discharge for further managment.      SECONDARY DISCHARGE DIAGNOSES  Diagnosis: Type 2 diabetes mellitus without complication, with long-term current use of insulin  Assessment and Plan of Treatment: PLEASE SCHEDULE APPOINTMENT TO FOLLOW UP WITH YOUR ENDOCRINOLOGIST WITHIN ONE WEEK OF DISCHARGE TO DISCUSS CONVERTING PREMIX 70/30 TO BASAL BOLUS INSULIN.  UNTIL FOLLOW UP, PLEASE CONTINUE TO TAKE PREMIX 70/30 BEFORE BREAKFAST AND BEFORE DINNER AS PRESCRIBED.  Continue your medication regimen as prescribed at discharge and a consistent carbohydrate diet (Meaning eating the same amount of carbohydrates at the same time each day). Monitor blood glucose levels throughout the day before meals and at bedtime. Record blood sugars and bring to outpatient providers appointment in order to be reviewed by your doctor for management modifications. If your sugars are more than 400 or less than 70 you should contact your PCP immediately. Monitor for signs/symptoms of low blood glucose, such as, dizziness, altered mental status, or cool/clammy skin. In addition, monitor for signs/symptoms of high blood glucose, such as, feeling hot, dry, fatigued, or with increased thirst/urination. Make regular podiatry appointments in order to have feet checked for wounds and uncontrolled toe nail growth to prevent infections, as well as, appointments with an ophthalmologist to monitor your vision.    Diagnosis: Hilar lymphadenopathy  Assessment and Plan of Treatment: A CAT Scan of your chest revealed enlarged lymph nodes in the right side of your chest, possibly reactive from COVID pneumonia.  Please follow up with your primary care doctor to discuss repeat imaging to assess for resolution.    Diagnosis: Hypertension  Assessment and Plan of Treatment: Continue medications as currently prescribed. Follow up with your primary care doctor for further management of disease. Follow a low salt, low cholesterol diet.    Diagnosis: Heart failure  Assessment and Plan of Treatment: You have a history of heart failure.  While admitted to the hospital, you had an ultrasound of your heart (echocardiogram) that was similar to your most recent outpatient one done in April 2020. Your ejection fraction while admitted was found to be 40%.  There were no signed of acute heart failure during this admission (, 322).  Please continue your medications and follow up with your cardiologist within one week.    Diagnosis: COVID-19  Assessment and Plan of Treatment: You have been diagnosed with the COVID-19 virus during your hospital stay. You must self quarantine to complete a 10 day time period from your first positive test.  Monitor for fevers, shortness of breath and cough primarily.  Monitor your temperature daily to not any changes and increases.    It has been determined that you no longer need hospitalization and can recover while remaining in self-quarantine at home. You should follow the prevention steps below until a healthcare provider or local or state health department says you can return to your normal activities.  1. You should restrict activities outside your home, except for getting medical care.  2. Do not go to work, school, or public areas.  3. Avoid using public transportation, ride-sharing, or taxis.  4. Separate yourself from other people and animals in your home.  5. Call ahead before visiting your doctor.  6. Wear a facemask.  7. Cover your coughs and sneezes.  8. Clean your hands often.  9. Avoid sharing personal household items.  10. Clean all “high-touch” surfaces everyday.  11. Monitor your symptoms.  If you have a medical emergency and need to call 911, notify the dispatch personnel that you have COVID-19 If possible, put on a facemask before emergency medical services arrive.  12. Stopping home isolation.  Patients with confirmed COVID-19 should remain under home isolation precautions for 10 days since the positive COVID-19 test and until the risk of secondary transmission to others is thought to be low. The decision to discontinue home isolation precautions should be made on a case-by-case basis, in consultation with healthcare providers and state and local health departments. Your [facility] Department of Health can be reached at [phone] for further information about COVID-19.

## 2021-03-29 NOTE — DISCHARGE NOTE PROVIDER - HOSPITAL COURSE
45M w/ hx of CAD, "heart weakness," HTN, insulin dependent T2DM, hx of Granulomatosis with polyangiitis in throat, GERD, obesity, and recent COVID-19 pneumonia (3/10) who presents with tachycardia, likely secondary to RLL segmental PE iso recent COVID19 infection and sedentary habits from recent back injury. no signs of RV strain or further LE DVT.     Single subsegmental pulmonary embolism without acute cor pulmonale  - RLL segmental PE likely provoked from COVID19 infection and sedentary habits from recent back injury. No previous history of VTE.  Tachycardic, no hypoxia.   - TTE with global LVDF. EF 40%. no RV strain. Findings similar to most recent outpt echo 4/2020  - BLE duplex neg to rule out DVT's  - S/p Lovenox 100mg SC x1 in ED, Lovenox 100mg BID --> changed to Eliquis PO  - Eliquis not covered, changed to Xarelto loading dose at discharge, prescription sent to [facility] pharmacy verified $20 copayment, agreeable to patient.    Coronary artery disease involving native heart without angina pectoris, unspecified vessel or lesion type  - CAD with possible hx of CHF. Patient reports "weak heart." Has TINOCO but denies any chest pain/palpitations, TINOCO likely in setting of COVID19 PNA and PE.   - EKG seems stable when compared to prior from June 20 2018.  - C/w Entresto  BID, Carvedilol 25mg BID, ASA 81mg daily, Atorvastatin 40mg daily, Hydralazine 25 mg TID  - echo with global LVDF. Findings similar to most recent outpt echo 4/2020  - Plan for outpt f/u with cardiology.    Type 2 DM without complication, with long-term current use of insulin  - Hyperglycemic to 200's since noon in ED. Takes premixed novolog 70/30 28 units in AM and 24 units before dinner, Metformin 1000mg BID, Glipizide 10mg daily.  - Check A1c-10.8  - Will f/u w/ outpt endocrinologist Dr. Tammie Mirza within one week of discharge to discuss converting to basal bolus dosing  - Glipizide d/c'ed due to risk of hypoglycemic episodes while on PreMix 70/30.  Will remain on PreMix 70/30 until endocrinology follow up.    COVID-19  - Multifocal pneumonia noted on CT and positive COVID on RVP, likely due to residual infection. More than 2 weeks from diagnosis (3/10/21).  - No hypoxia on RA currently, no indication to start remdesivir, dexamethasone  - Supportive symptomatic treatment with tessalon perles  - Patient reports 2 episodes of liquid diarrhea recently, possibly 2/2 COVID19 infection, no episodes while inpatient.  - Has mild ALT elevation, will trend for now.    Hilar lymphadenopathy  - R hilar lymphadenopathy, possibly reactive from COVID pneumonia.  - Will f/u with PCP on discharge for repeat imaging as indicated    Anemia  - Normocytic anemia with Hgb 12.1, baseline previously 13.  No active bleeding.  - Fe studies reviewed. mildly elevated ferritin in setting of recent COVID infection   - no overt signs of bleeding     Numbness of toes  - Numbness of L 2nd toe x 1 week, possibly peripheral neuropathy from DM A1c-10.8   - Currently resolved as per patient.    Patient seen and evaluated. Reviewed discharge medications with patient and attending. All new medications requiring new prescriptions were sent to the pharmacy of patient's choice. Reviewed need for prescription for previous home medications and new prescriptions sent if requested. Medically cleared/stable for discharge as per Dr. Darnell on 3/29/2021 with appropriate follow up. Patient understands and agrees with plan of care.

## 2021-03-29 NOTE — DISCHARGE NOTE NURSING/CASE MANAGEMENT/SOCIAL WORK - PATIENT PORTAL LINK FT
You can access the FollowMyHealth Patient Portal offered by [facility] by registering at the following website: [email or web address]. By joining [employer]’s FollowMyHealth portal, you will also be able to view your health information using other applications (apps) compatible with our system.

## 2021-03-29 NOTE — DISCHARGE NOTE PROVIDER - NSDCMRMEDTOKEN_GEN_ALL_CORE_FT
apixaban 5 mg oral tablet: 2 tab(s) orally every 12 hours  ** VERIFY COST, PRICE CHECK **  apixaban 5 mg oral tablet: 1 tab(s) orally 2 times a day   ** WALLS CHECK, VERIFY COST **  Aspirin Enteric Coated 81 mg oral delayed release tablet: 1 tab(s) orally once a day  atorvastatin 40 mg oral tablet: 1 tab(s) orally once a day  carvedilol 25 mg oral tablet: 1 tab(s) orally 2 times a day  Entresto 97 mg-103 mg oral tablet: 1 tab(s) orally 2 times a day  FAMOTIDINE 20MG TABLETS: TAKE 1 TABLET BY MOUTH AT BEDTIME  glipiZIDE 10 mg oral tablet: 1 tab BID  HYDRALAZINE  25MG TABLETS(ORANGE): 1 each orally 3 times a day  metFORMIN 1000 mg oral tablet: 1 tab(s) orally 2 times a day  NovoLOG Mix 70/30 FlexPen subcutaneous suspension: 28 unit(s) subcutaneous in AM and 24 units before dinner  TADALAFIL 20MG TABLET: TAKE ONE TABLET BY MOUTH 1 HOUR PRIOR TO SEXUAL ACTIVITY   Aspirin Enteric Coated 81 mg oral delayed release tablet: 1 tab(s) orally once a day  atorvastatin 40 mg oral tablet: 1 tab(s) orally once a day  carvedilol 25 mg oral tablet: 1 tab(s) orally 2 times a day  Entresto 97 mg-103 mg oral tablet: 1 tab(s) orally 2 times a day  FAMOTIDINE 20MG TABLETS: TAKE 1 TABLET BY MOUTH AT BEDTIME  glipiZIDE 10 mg oral tablet: 1 tab BID  HYDRALAZINE  25MG TABLETS(ORANGE): 1 each orally 3 times a day  metFORMIN 1000 mg oral tablet: 1 tab(s) orally 2 times a day  NovoLOG Mix 70/30 FlexPen subcutaneous suspension: 28 unit(s) subcutaneous in AM and 24 units before dinner  TADALAFIL 20MG TABLET: TAKE ONE TABLET BY MOUTH 1 HOUR PRIOR TO SEXUAL ACTIVITY  Xarelto Starter Pack 15 mg-20 mg oral kit: TAKE AS DIRECTED  ** VERIFY COST, PRICE CHECK  ** Pager provider [phone]   Aspirin Enteric Coated 81 mg oral delayed release tablet: 1 tab(s) orally once a day  atorvastatin 40 mg oral tablet: 1 tab(s) orally once a day  carvedilol 25 mg oral tablet: 1 tab(s) orally 2 times a day  Entresto 97 mg-103 mg oral tablet: 1 tab(s) orally 2 times a day  FAMOTIDINE 20MG TABLETS: TAKE 1 TABLET BY MOUTH AT BEDTIME  HYDRALAZINE  25MG TABLETS(ORANGE): 1 each orally 3 times a day  metFORMIN 1000 mg oral tablet: 1 tab(s) orally 2 times a day  NovoLOG Mix 70/30 FlexPen subcutaneous suspension: 28 unit(s) subcutaneous in AM and 24 units before dinner  Xarelto Starter Pack 15 mg-20 mg oral kit: TAKE AS DIRECTED  ** VERIFY COST, PRICE CHECK  ** Pager provider [phone]

## 2021-03-29 NOTE — CHART NOTE - NSCHARTNOTEFT_GEN_A_CORE
Notified by RN for 3 beats of non-sustained VT's on telemetry.  Patient was asymptomatic.  VT possibly secondary to PE vs. CHF.  Patient likely has CHF (unbeknownst to patient) given home medication includes entresto, carvedilol, hydralazine (not on imdur).  Patient's carvedilol was restarted upon admission and scheduled to be given in PM of 3/27/21, but not administered in the ED.  Notified RN in [address] [place] to administer carvedilol.    Lionel Flores, PGY1  Internal Medicine
Pt follows w/ outpatient cardiology, Dr. Prasanna Gunn  Obtained most recent echocardiogram, TTE 4/22/2020    Report placed in chart, results as follows:  LV: Mildly enlarged LV with reduced LV systolic function.  Estimated LVE 40-45%. Evidence of diastolic LV dysfunction  RV: Not visualized  Atrial: Normal LA size, RA not visualized  Aortic Valve: Normal valvular function  Mitral Valve: Trace mitral regurgitation  Tricuspid Valve: Grossly, trace tricuspid regurgitation  Pulmonic Valve: Grossly, normal valvular function  Estimated PA systolic pressure: 20mmHg, normal      Maribel Rahman NP-BC  Department of Medicine  In House Pager #[phone]

## 2021-03-29 NOTE — DISCHARGE NOTE PROVIDER - CARE PROVIDERS DIRECT ADDRESSES
[email or web address]

## 2021-03-30 ENCOUNTER — TRANSCRIPTION ENCOUNTER (OUTPATIENT)
Age: 46
End: 2021-03-30

## 2021-04-15 ENCOUNTER — APPOINTMENT (OUTPATIENT)
Dept: MRI IMAGING | Facility: CLINIC | Age: 46
End: 2021-04-15
Payer: COMMERCIAL

## 2021-04-15 ENCOUNTER — OUTPATIENT (OUTPATIENT)
Dept: OUTPATIENT SERVICES | Facility: HOSPITAL | Age: 46
LOS: 1 days | End: 2021-04-15
Payer: COMMERCIAL

## 2021-04-15 DIAGNOSIS — Z87.19 PERSONAL HISTORY OF OTHER DISEASES OF THE DIGESTIVE SYSTEM: Chronic | ICD-10-CM

## 2021-04-15 DIAGNOSIS — M31.30 WEGENER'S GRANULOMATOSIS WITHOUT RENAL INVOLVEMENT: Chronic | ICD-10-CM

## 2021-04-15 DIAGNOSIS — I42.0 DILATED CARDIOMYOPATHY: ICD-10-CM

## 2021-04-15 DIAGNOSIS — I50.22 CHRONIC SYSTOLIC (CONGESTIVE) HEART FAILURE: ICD-10-CM

## 2021-04-15 PROBLEM — K21.9 GASTRO-ESOPHAGEAL REFLUX DISEASE WITHOUT ESOPHAGITIS: Chronic | Status: ACTIVE | Noted: 2021-03-28

## 2021-04-15 PROBLEM — U07.1 COVID-19: Chronic | Status: ACTIVE | Noted: 2021-03-27

## 2021-04-15 PROCEDURE — 75561 CARDIAC MRI FOR MORPH W/DYE: CPT | Mod: 26

## 2021-04-15 PROCEDURE — A9585: CPT

## 2021-04-15 PROCEDURE — 75561 CARDIAC MRI FOR MORPH W/DYE: CPT

## 2021-04-27 ENCOUNTER — NON-APPOINTMENT (OUTPATIENT)
Age: 46
End: 2021-04-27

## 2021-05-12 DIAGNOSIS — Z01.818 ENCOUNTER FOR OTHER PREPROCEDURAL EXAMINATION: ICD-10-CM

## 2021-05-14 ENCOUNTER — APPOINTMENT (OUTPATIENT)
Dept: DISASTER EMERGENCY | Facility: CLINIC | Age: 46
End: 2021-05-14

## 2021-05-14 LAB — SARS-COV-2 N GENE NPH QL NAA+PROBE: NOT DETECTED

## 2021-05-18 ENCOUNTER — APPOINTMENT (OUTPATIENT)
Dept: PULMONOLOGY | Facility: CLINIC | Age: 46
End: 2021-05-18
Payer: COMMERCIAL

## 2021-05-18 VITALS
DIASTOLIC BLOOD PRESSURE: 110 MMHG | WEIGHT: 244 LBS | TEMPERATURE: 98.4 F | BODY MASS INDEX: 31.32 KG/M2 | HEIGHT: 74 IN | OXYGEN SATURATION: 99 % | SYSTOLIC BLOOD PRESSURE: 164 MMHG | HEART RATE: 104 BPM

## 2021-05-18 VITALS — DIASTOLIC BLOOD PRESSURE: 105 MMHG | SYSTOLIC BLOOD PRESSURE: 159 MMHG

## 2021-05-18 DIAGNOSIS — Z00.00 ENCOUNTER FOR GENERAL ADULT MEDICAL EXAMINATION W/OUT ABNORMAL FINDINGS: ICD-10-CM

## 2021-05-18 DIAGNOSIS — I26.93 SINGLE SUBSEGMENTAL PULMONARY EMBOLISM W/OUT ACUTE COR PULMONALE: ICD-10-CM

## 2021-05-18 PROCEDURE — 94726 PLETHYSMOGRAPHY LUNG VOLUMES: CPT

## 2021-05-18 PROCEDURE — 94729 DIFFUSING CAPACITY: CPT

## 2021-05-18 PROCEDURE — ZZZZZ: CPT

## 2021-05-18 PROCEDURE — 99205 OFFICE O/P NEW HI 60 MIN: CPT | Mod: 25

## 2021-05-18 PROCEDURE — 94010 BREATHING CAPACITY TEST: CPT

## 2021-05-18 RX ORDER — RIVAROXABAN 15 MG-20MG
15 & 20 KIT ORAL
Qty: 51 | Refills: 0 | Status: DISCONTINUED | COMMUNITY
Start: 2021-03-29 | End: 2021-05-18

## 2021-05-18 NOTE — HISTORY OF PRESENT ILLNESS
[Former] : former [< 30 pack-years] : < 30 pack-years [Never] : never [Nasal Passage Blockage (Stuffiness)] : edema [Nonspecific Pain, Swelling, And Stiffness] : chest pain [Fever] : fever [Cough] : coughing [Wheezing] : wheezing [Difficulty Breathing During Exertion] : dyspnea on exertion [Feelings Of Weakness On Exertion] : exercise intolerance [0  -  Nothing at all] : 0, nothing at all [TextBox_4] : 45M DM2, HTN, HLD, and Granulomatosis with Polyangiitis (Wegener's) followed closely by Rheumatology and ENT given history of subglottic stenosis presenting for initial pulmonary evaluation after recent hospitalization at [facility] in the setting of COVID-19.\par \par He was hospitalized from 3/27-3/29 with tachycardia and shortness of breath. He was found to be COVID PCR positive and have a subsegemntal RLL PE on CTPA. He was started on systemic anticoagulation. He had negative LE dopplers. He has done well since his discharge home. He has not reported any problems with the medications and has no evidence of bleeding.\par \par He has a known, stable, reduction in his LV function (EF ~ 40%) and follows closely with his cardiologist. He has not noted any LE edema or weight changes. He is on Coreg, Entresto, Atorvastatin, Hydralazine, and spironolactone ofr his heart disease. \par \par He denies any pulmonary complaints. He denies coughing, shortness of breath, sputum production, or hemoptysis. He denies nausea, vomiting, or abdominal pain. He is reportedly not as active as he used to be but he downplays this. He does not have any LE edema. he follows up regularly with his cardiologist. \par \par He has a prior smoking history. He smoked a 1/4 ppd for about 25 years. He quit 1 year ago he tells me. He does not use E-cigarettes, Hookah, or other illicit drugs.\par \par With regards to his Wegeners - it was initially diagnosed when he started having voice changes. He has had significant improvement after prior excision surgeries and treatment with Rituxan. He recently saw his ENT (Dr. Onofre) who did a laryngoscopy which was good. Patient feels that his voice is normal. He denies any aspiration symptoms. He had PFTs in office today that do not demonstrate any flattening of his inspiratory or expiratory flow volume loop.\par \par He tells me he received his pneumococcal vaccine in 2021\par  [TextBox_11] : 0.25 [TextBox_13] : 25 [YearQuit] : 2020 [TextBox_12] : 3/27/2021 - \par FINDINGS:\par \par LUNGS AND AIRWAYS: Patent central airways. Scattered patchy groundglass opacities consistent with Covid pneumonia.\par PLEURA: No pleural effusion.\par MEDIASTINUM AND STEVEN: Several borderline mediastinal lymph nodes new since 07/20/2017. Right hilar lymphadenopathy 19 x 17 mm.\par VESSELS: Right lower lobe segmental pulmonary emboli.\par HEART: Heart size is normal. No pericardial effusion.\par CHEST WALL AND LOWER NECK: Within normal limits.\par VISUALIZED UPPER ABDOMEN: Within normal limits.\par BONES: Within normal limits.\par \par IMPRESSION:\par Right lower lobe segmental pulmonary emboli.\par Multifocal pneumonia consistent with Covid diagnosis\par Right hilar lymphadenopathy.\par  [TextBox_42] : 5/10/21 - R - CT with contrast - Lungs: No infiltrate or mass lesion visualized. No bronchiectasis demonstrated. Small focal paraseptal bulla noted right apex. Vasculature: unremarkable. Impression: No evidence of adenopathy or infiltrate.

## 2021-05-18 NOTE — REVIEW OF SYSTEMS
[SOB on Exertion] : sob on exertion [GERD] : gerd [Myalgias] : myalgias [Diabetes] : diabetes [Obesity] : obesity [Fever] : no fever [Fatigue] : no fatigue [Chills] : no chills [Poor Appetite] : no poor appetite [Dry Eyes] : no dry eyes [Ear Disturbance] : no ear disturbance [Epistaxis] : no epistaxis [Sore Throat] : no sore throat [Nasal Congestion] : no nasal congestion [Postnasal Drip] : no postnasal drip [Dry Mouth] : no dry mouth [Mouth Ulcers] : no mouth ulcers [Cough] : no cough [Hemoptysis] : no hemoptysis [Chest Tightness] : no chest tightness [Frequent URIs] : no frequent URIs [Sputum] : no sputum [Dyspnea] : no dyspnea [Wheezing] : no wheezing [Chest Discomfort] : no chest discomfort [Claudication] : no claudication [Edema] : no edema [Orthopnea] : no orthopnea [Watery Eyes] : no watery eyes [Itchy Eyes] : no itchy eyes [Abdominal Pain] : no abdominal pain [Nausea] : no nausea [Vomiting] : no vomiting [Dysuria] : no urgency [Arthralgias] : no arthralgias [Itch] : no itch [Headache] : no headache [Focal Weakness] : no focal weakness [Head Injury] : no head injury [Dizziness] : no dizziness [Numbness] : no numbness [Paralysis] : no paralysis [Confusion] : no confusion [Depression] : no depression [Anxiety] : no anxiety [Panic Attacks] : no panic attacks [Thyroid Problem] : no thyroid problem

## 2021-05-18 NOTE — PHYSICAL EXAM
[No Acute Distress] : no acute distress [Well Nourished] : well nourished [Well Groomed] : well groomed [No Deformities] : no deformities [Well Developed] : well developed [Normal Oropharynx] : normal oropharynx [Normal Appearance] : normal appearance [Supple] : supple [No Neck Mass] : no neck mass [No JVD] : no jvd [Normal Rate/Rhythm] : normal rate/rhythm [Normal S1, S2] : normal s1, s2 [No Resp Distress] : no resp distress [No Acc Muscle Use] : no acc muscle use [Clear to Auscultation Bilaterally] : clear to auscultation bilaterally [No Abnormalities] : no abnormalities [Benign] : benign [Not Tender] : not tender [Soft] : soft [Normal Gait] : normal gait [No Clubbing] : no clubbing [No Cyanosis] : no cyanosis [No Edema] : no edema [FROM] : FROM [Normal Color/ Pigmentation] : normal color/ pigmentation [No Focal Deficits] : no focal deficits [No Sensory Deficits] : no sensory deficits [No Motor Deficits] : no motor deficits [Oriented x3] : oriented x3 [Normal Mood] : normal mood [Normal Affect] : normal affect [TextBox_11] : NCAT, EOMI, anicteric, trachea midline

## 2021-05-18 NOTE — CONSULT LETTER
[Dear  ___] : Dear  [unfilled], [Consult Letter:] : I had the pleasure of evaluating your patient, [unfilled]. [Please see my note below.] : Please see my note below. [Consult Closing:] : Thank you very much for allowing me to participate in the care of this patient.  If you have any questions, please do not hesitate to contact me. [Sincerely,] : Sincerely, [FreeTextEntry3] : Pepe Sinha MD\par  [DrMarcellus  ___] : Dr. CERNA [DrMarcellus ___] : Dr. CERNA

## 2021-05-18 NOTE — ASSESSMENT
[FreeTextEntry1] : 45M DM2, HTN, HLD, and GPA (Wegener's) presenting for initial pulmonary evaluation after recent hospitalization at [facility] for COVID-19. During that hospitalization he was found to have a RLL PE and started on AC. He has been doing well since discharge and did not require supplemental O2 on return home.\par \par 1. Pulmonary Embolism - patient had a CTPA which showed a RLL PE. He had a repeat CT chest with contrast done 5/10 which does not comment on any vascular abnormalities. I called [facility] and spoke with the radiologist and they do not seen any evidence of PE. However, it was noted that this contrast timing was not optimal for PE evaluation as it was not protocoled as a PE study.\par - Patient will need to remain on AC for at least 3 months. My inclination given his prior negative LE dopplers during hospitalization and his repeat CT without evidence of right heart dysfunction or gross pulmonary thrombi would be to stop AC after 3 months. Patient tells me his cardiologist has discussed possibly continuing to 6 months which is also reasonable.\par \par 2. Post COVID-19 - patient now doing well. His repeat CT shows resolution of the previously noted opacities and no significant lymphadenopathy. He is not hypoxic.\par - He has received his COVID-19 vaccines x 2 - with the last dose about 1 week ago.\par - He needs to increase his activity.\par \par 3. Dyspnea - overall improving and likely related to his recent COVID illness and deconditioning. Patient had PFTs done today which show a mild-moderate reduction in diffusion capacity but no evidence of obstructive or restrictive defect.\par - He likely is deconditioned as he was not moving much after he lost his job in January. He needs to increase his activity level\par - Repeat PFTs with DLCO in 1 year. CT chest reviewed\par - Cardiology followup given extensive heart disease and LV dysfunction.\par \par 4. Vaccines - patient has received COVID-19 vaccine\par - Patient states he has received the pneumococcal vaccine which we will need to confirm with his PMD - possibly earlier this year\par \par 5. Granulomatosis with Polyangiitis (Wegeners) - continue followup with ENT and Rheum. Patient no longer on therapy for this.\par - PFTs today do not show evidence of intrathoracic or extrathoracic obstruction as he has a normal flow volume loop without flattening\par \par 6. Followup as needed.\par - Patient will call me with any questions or concerns\par - I discussed my thoughts and opinion with patient and a family member over the phone. All questions answered.

## 2021-06-15 ENCOUNTER — APPOINTMENT (OUTPATIENT)
Dept: RHEUMATOLOGY | Facility: CLINIC | Age: 46
End: 2021-06-15
Payer: COMMERCIAL

## 2021-06-15 PROCEDURE — 99213 OFFICE O/P EST LOW 20 MIN: CPT

## 2021-06-15 RX ORDER — BETAMETHASONE DIPROPIONATE 0.5 MG/G
0.05 CREAM TOPICAL
Qty: 1 | Refills: 5 | Status: DISCONTINUED | COMMUNITY
Start: 2019-03-08 | End: 2021-06-15

## 2021-06-15 NOTE — ASSESSMENT
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 Wegeners- stable after 2nd dose of Rituxan 06/2019 -  stable disease - no regrowth -  voice is stable - - had covid in 03/2021 - hospitalized for 3 days - completely recovered - had covid vaccine - completed - no complains today - voice is stable no dysphagia\par 2.Diabetes - now with A1C at 8 \par 3. S/p covid 19 - with PE now on blood thinner\par \par labsdone at pcp in  05/2021 \par A1C 8.7\par normal - cmp -  cr 1.07\par hepatic panel - normal\par normal cbc\par \par \par I reviewed previous labs results with patients.\par labs to be done at PCP - will request copy\par Diagnosis and Prognosis discussed\par Continue with current medications\par f/u 6 months\par \par

## 2021-06-15 NOTE — PHYSICAL EXAM
[General Appearance - Alert] : alert [General Appearance - In No Acute Distress] : in no acute distress [Neck Appearance] : the appearance of the neck was normal [Neck Cervical Mass (___cm)] : no neck mass was observed [Jugular Venous Distention Increased] : there was no jugular-venous distention [Thyroid Diffuse Enlargement] : the thyroid was not enlarged [Thyroid Nodule] : there were no palpable thyroid nodules [Auscultation Breath Sounds / Voice Sounds] : lungs were clear to auscultation bilaterally [Heart Sounds] : normal S1 and S2 [Heart Rate And Rhythm] : heart rate was normal and rhythm regular [Heart Sounds Gallop] : no gallops [Murmurs] : no murmurs [Heart Sounds Pericardial Friction Rub] : no pericardial rub [Full Pulse] : the pedal pulses are present [Edema] : there was no peripheral edema [Bowel Sounds] : normal bowel sounds [Abdomen Soft] : soft [Abdomen Tenderness] : non-tender [Abdomen Mass (___ Cm)] : no abdominal mass palpated [Cervical Lymph Nodes Enlarged Posterior Bilaterally] : posterior cervical [Supraclavicular Lymph Nodes Enlarged Bilaterally] : supraclavicular [Cervical Lymph Nodes Enlarged Anterior Bilaterally] : anterior cervical [Abnormal Walk] : normal gait [Nail Clubbing] : no clubbing  or cyanosis of the fingernails [Musculoskeletal - Swelling] : no joint swelling seen [Motor Tone] : muscle strength and tone were normal [Skin Turgor] : normal skin turgor [Skin Color & Pigmentation] : normal skin color and pigmentation [] : no rash [Oriented To Time, Place, And Person] : oriented to person, place, and time [Impaired Insight] : insight and judgment were intact [Affect] : the affect was normal

## 2021-06-17 ENCOUNTER — APPOINTMENT (OUTPATIENT)
Dept: OTOLARYNGOLOGY | Facility: CLINIC | Age: 46
End: 2021-06-17
Payer: COMMERCIAL

## 2021-06-17 VITALS
HEIGHT: 74 IN | SYSTOLIC BLOOD PRESSURE: 133 MMHG | WEIGHT: 235 LBS | BODY MASS INDEX: 30.16 KG/M2 | DIASTOLIC BLOOD PRESSURE: 87 MMHG | HEART RATE: 84 BPM

## 2021-06-17 PROCEDURE — 99214 OFFICE O/P EST MOD 30 MIN: CPT | Mod: 25

## 2021-06-17 PROCEDURE — 31579 LARYNGOSCOPY TELESCOPIC: CPT

## 2021-06-17 NOTE — CONSULT LETTER
[Dear  ___] : Dear  [unfilled], [Consult Letter:] : I had the pleasure of evaluating your patient, [unfilled]. [Please see my note below.] : Please see my note below. [Consult Closing:] : Thank you very much for allowing me to participate in the care of this patient.  If you have any questions, please do not hesitate to contact me. [Sincerely,] : Sincerely, [FreeTextEntry3] : Olvin Onofre MD, PhD\par Chief, Division of Laryngology\par Department of Otolaryngology\par [facility]\par Pediatric Otolaryngology, [facility]\par  of Otolaryngology\par [facility]\par

## 2021-06-17 NOTE — REASON FOR VISIT
[Subsequent Evaluation] : a subsequent evaluation for [FreeTextEntry2] : follow up for dysphonia and laryngeal mass. \par

## 2021-06-17 NOTE — HISTORY OF PRESENT ILLNESS
[de-identified] : 45 year old male follow up for dysphonia and laryngeal mass. States he is feeling well, states voice is strong, continues to get better. Denies dysphagia, odynophagia, dyspnea. No choking/aspiration. Continues famotidine for reflux and nystatin for thrush as needed. BS under control. No hemoptysis. Using flonase PRN for nasal congestion. Followed up with Dr. Newman, Rheumatologist 06/15/2021. States had COVID 03/2021, was in hospital with SOB and PNA, PE now on anticoag. States completed COVID vaccine (Pfizer) 05/2021. \par

## 2021-06-17 NOTE — PHYSICAL EXAM
[FreeTextEntry1] : mildly raspy voice, much improved [Midline] : trachea located in midline position [Laryngoscopy Performed] : laryngoscopy was performed, see procedure section for findings [de-identified] : thrush  [Normal] : no rashes [de-identified] : improved voice, moderately raspy, not breathy

## 2021-08-25 ENCOUNTER — APPOINTMENT (OUTPATIENT)
Dept: OTOLARYNGOLOGY | Facility: CLINIC | Age: 46
End: 2021-08-25

## 2021-12-14 ENCOUNTER — APPOINTMENT (OUTPATIENT)
Dept: RHEUMATOLOGY | Facility: CLINIC | Age: 46
End: 2021-12-14
Payer: COMMERCIAL

## 2021-12-14 VITALS
SYSTOLIC BLOOD PRESSURE: 137 MMHG | HEIGHT: 74 IN | TEMPERATURE: 98 F | HEART RATE: 93 BPM | WEIGHT: 245 LBS | OXYGEN SATURATION: 97 % | BODY MASS INDEX: 31.44 KG/M2 | DIASTOLIC BLOOD PRESSURE: 96 MMHG

## 2021-12-14 DIAGNOSIS — J03.91 ACUTE RECURRENT TONSILLITIS, UNSPECIFIED: ICD-10-CM

## 2021-12-14 PROCEDURE — 99214 OFFICE O/P EST MOD 30 MIN: CPT

## 2021-12-14 RX ORDER — RIVAROXABAN 20 MG/1
20 TABLET, FILM COATED ORAL
Qty: 90 | Refills: 0 | Status: DISCONTINUED | COMMUNITY
Start: 2021-04-20 | End: 2021-12-14

## 2021-12-14 RX ORDER — FAMOTIDINE 20 MG/1
20 TABLET, FILM COATED ORAL
Qty: 180 | Refills: 0 | Status: DISCONTINUED | COMMUNITY
Start: 2020-10-01 | End: 2021-12-14

## 2021-12-14 RX ORDER — OXYCODONE AND ACETAMINOPHEN 5; 325 MG/1; MG/1
5-325 TABLET ORAL
Qty: 12 | Refills: 0 | Status: DISCONTINUED | COMMUNITY
Start: 2021-01-25 | End: 2021-12-14

## 2021-12-14 NOTE — ASSESSMENT
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 Wegeners- stable after 2nd dose of Rituxan 06/2019 -  stable disease - no regrowth -  voice is stable - - had covid in 03/2021 - hospitalized for 3 days - completely recovered - had covid vaccine - recent uri - send for amoxicillin - tonsil are enlarged on examination - re-start budesonide - will reach out to ent for a f/u\par 2.Diabetes - now with A1C at 8 - check levels again\par 3. S/p covid 19 - resolved!\par \par \par \par \par I reviewed previous labs results with patients.\par labs to be done at PCP - will request copy\par Diagnosis and Prognosis discussed\par Continue with current medications\par f/u 6 months\par \par

## 2021-12-14 NOTE — HISTORY OF PRESENT ILLNESS
[___ Month(s) Ago] : [unfilled] month(s) ago [FreeTextEntry1] : has fully recovered from covid 19 - \par now with 2-3 weeks of a URI with ongoing throat itching\par had a cough until 2-3 days ago with chest congestion and mucous\par no fever\par no change in voice - \par \par

## 2021-12-14 NOTE — PHYSICAL EXAM
[General Appearance - Alert] : alert [General Appearance - In No Acute Distress] : in no acute distress [Neck Appearance] : the appearance of the neck was normal [Neck Cervical Mass (___cm)] : no neck mass was observed [Jugular Venous Distention Increased] : there was no jugular-venous distention [Thyroid Diffuse Enlargement] : the thyroid was not enlarged [Thyroid Nodule] : there were no palpable thyroid nodules [Auscultation Breath Sounds / Voice Sounds] : lungs were clear to auscultation bilaterally [Heart Rate And Rhythm] : heart rate was normal and rhythm regular [Heart Sounds] : normal S1 and S2 [Heart Sounds Gallop] : no gallops [Murmurs] : no murmurs [Heart Sounds Pericardial Friction Rub] : no pericardial rub [Edema] : there was no peripheral edema [Full Pulse] : the pedal pulses are present [Bowel Sounds] : normal bowel sounds [Abdomen Soft] : soft [Abdomen Tenderness] : non-tender [Abdomen Mass (___ Cm)] : no abdominal mass palpated [Cervical Lymph Nodes Enlarged Posterior Bilaterally] : posterior cervical [Cervical Lymph Nodes Enlarged Anterior Bilaterally] : anterior cervical [Supraclavicular Lymph Nodes Enlarged Bilaterally] : supraclavicular [Abnormal Walk] : normal gait [Nail Clubbing] : no clubbing  or cyanosis of the fingernails [Musculoskeletal - Swelling] : no joint swelling seen [Motor Tone] : muscle strength and tone were normal [Skin Color & Pigmentation] : normal skin color and pigmentation [Skin Turgor] : normal skin turgor [] : no rash [Oriented To Time, Place, And Person] : oriented to person, place, and time [Impaired Insight] : insight and judgment were intact [Affect] : the affect was normal [FreeTextEntry1] : mildly red throat with enlarged tonsils

## 2021-12-21 LAB
ALBUMIN SERPL ELPH-MCNC: 4.7 G/DL
ALP BLD-CCNC: 53 U/L
ALT SERPL-CCNC: 27 U/L
ANION GAP SERPL CALC-SCNC: 16 MMOL/L
AST SERPL-CCNC: 15 U/L
BASOPHILS # BLD AUTO: 0.04 K/UL
BASOPHILS NFR BLD AUTO: 0.6 %
BILIRUB SERPL-MCNC: 0.4 MG/DL
BUN SERPL-MCNC: 17 MG/DL
CALCIUM SERPL-MCNC: 10 MG/DL
CHLORIDE SERPL-SCNC: 102 MMOL/L
CO2 SERPL-SCNC: 20 MMOL/L
CREAT SERPL-MCNC: 1.34 MG/DL
DEPRECATED KAPPA LC FREE/LAMBDA SER: 1.06 RATIO
EOSINOPHIL # BLD AUTO: 0.11 K/UL
EOSINOPHIL NFR BLD AUTO: 1.5 %
ESTIMATED AVERAGE GLUCOSE: 252 MG/DL
GLUCOSE SERPL-MCNC: 231 MG/DL
HBA1C MFR BLD HPLC: 10.4 %
HCT VFR BLD CALC: 46 %
HGB BLD-MCNC: 14.7 G/DL
IGA SER QL IEP: 478 MG/DL
IGG SER QL IEP: 1278 MG/DL
IGM SER QL IEP: 87 MG/DL
IMM GRANULOCYTES NFR BLD AUTO: 0.1 %
KAPPA LC CSF-MCNC: 1.86 MG/DL
KAPPA LC SERPL-MCNC: 1.97 MG/DL
LYMPHOCYTES # BLD AUTO: 3.68 K/UL
LYMPHOCYTES NFR BLD AUTO: 50.7 %
MAN DIFF?: NORMAL
MCHC RBC-ENTMCNC: 30.1 PG
MCHC RBC-ENTMCNC: 32 GM/DL
MCV RBC AUTO: 94.1 FL
MONOCYTES # BLD AUTO: 0.53 K/UL
MONOCYTES NFR BLD AUTO: 7.3 %
NEUTROPHILS # BLD AUTO: 2.89 K/UL
NEUTROPHILS NFR BLD AUTO: 39.8 %
PLATELET # BLD AUTO: 255 K/UL
POTASSIUM SERPL-SCNC: 5.3 MMOL/L
PROT SERPL-MCNC: 7.6 G/DL
RBC # BLD: 4.89 M/UL
RBC # FLD: 13.2 %
SODIUM SERPL-SCNC: 138 MMOL/L
WBC # FLD AUTO: 7.26 K/UL

## 2022-04-11 PROBLEM — Z11.59 SCREENING FOR VIRAL DISEASE: Status: ACTIVE | Noted: 2020-08-18

## 2022-04-27 ENCOUNTER — APPOINTMENT (OUTPATIENT)
Dept: OTOLARYNGOLOGY | Facility: CLINIC | Age: 47
End: 2022-04-27
Payer: COMMERCIAL

## 2022-04-27 PROCEDURE — 31579 LARYNGOSCOPY TELESCOPIC: CPT

## 2022-04-27 PROCEDURE — 99214 OFFICE O/P EST MOD 30 MIN: CPT | Mod: 25

## 2022-04-27 RX ORDER — AMOXICILLIN 875 MG/1
875 TABLET, FILM COATED ORAL
Qty: 10 | Refills: 0 | Status: DISCONTINUED | COMMUNITY
Start: 2021-12-14 | End: 2022-04-27

## 2022-04-27 RX ORDER — BUDESONIDE 0.5 MG/2ML
0.5 INHALANT ORAL TWICE DAILY
Qty: 1 | Refills: 2 | Status: ACTIVE | COMMUNITY
Start: 2022-04-27 | End: 1900-01-01

## 2022-04-27 RX ORDER — NYSTATIN 100000 [USP'U]/ML
100000 SUSPENSION ORAL 3 TIMES DAILY
Qty: 1 | Refills: 1 | Status: DISCONTINUED | COMMUNITY
Start: 2020-10-01 | End: 2022-04-27

## 2022-05-01 ENCOUNTER — RX RENEWAL (OUTPATIENT)
Age: 47
End: 2022-05-01

## 2022-06-04 NOTE — REASON FOR VISIT
[Subsequent Evaluation] : a subsequent evaluation for [FreeTextEntry2] : dysphonia and laryngeal mass. \par dysphonia and laryngeal mass. \par dysphonia and laryngeal mass. \par dysphonia and laryngeal mass. \par dysphonia and laryngeal mass

## 2022-06-04 NOTE — CONSULT LETTER
[Dear  ___] : Dear  [unfilled], [Consult Letter:] : I had the pleasure of evaluating your patient, [unfilled]. [Please see my note below.] : Please see my note below. [Consult Closing:] : Thank you very much for allowing me to participate in the care of this patient.  If you have any questions, please do not hesitate to contact me. [Sincerely,] : Sincerely, [FreeTextEntry3] : Olvin Onofre MD, PhD\par Chief, Division of Laryngology\par Department of Otolaryngology\par [facility]\par Pediatric Otolaryngology, [facility]\par  of Otolaryngology\par [facility] [employer]\par

## 2022-06-04 NOTE — ADDENDUM
[FreeTextEntry1] : Documented by Jorje Honeycutt acting as scribe for Dr. Onofre on 04/27/2022.\par \par All Medical record entries made by the Scribe were at my, Dr. Onofre's, direction and personally dictated by me on 04/27/2022 . I have reviewed the chart and agree that the record accurately reflects my personal performance of the history, physical exam, assessment and plan. I have also personally directed, reviewed, and agreed with the discharge instructions.\par

## 2022-06-04 NOTE — HISTORY OF PRESENT ILLNESS
[de-identified] : 46 year old male follow up for dysphonia and laryngeal mass. reported very raspy about two month ago but doing better after the nebulizer. States he is feeling well, voice has been strong, continues to get better. Denies dysphagia, odynophagia, dyspnea. No choking/aspiration. BS under control. No hemoptysis. \par Thick thrush on tongue has gotten better.\par Continues famotidine for reflux, Uses saline spray. Followed up with Dr. Newman, Rheumatologist 12/14/2022. \par Patient denies otalgia, otorrhea, ear infections, hearing loss, tinnitus, dizziness, vertigo, headaches related to hearing. \par no recent infection or fever. \par \par  \par

## 2022-07-06 ENCOUNTER — RX RENEWAL (OUTPATIENT)
Age: 47
End: 2022-07-06

## 2022-07-15 NOTE — H&P ADULT - NSRESEARCHGRANT_MLMHIDDEN_GEN_A_CORE
Date: 7/15/2022      Lisseth Hudson  [address] [id number]        Dear Ms. Michaelnicholas,    This letter is to inform you that your biopsies from your recent colonoscopy are benign (non cancerous).  It is recommended to have a repeat colonoscopy in 10 years. Please call the [facility] at [phone] if you have any questions or concerns.     Sincerely,        Dr. Ariel Oconnor  Gastroenterology   [address] [address]   yes

## 2022-10-03 NOTE — H&P PST ADULT - VENOUS THROMBOEMBOLISM BMI
31-40 (obesity) Eucrisa Counseling: Patient may experience a mild burning sensation during topical application. Eucrisa is not approved in children less than 2 years of age.

## 2022-10-09 ENCOUNTER — EMERGENCY (EMERGENCY)
Facility: HOSPITAL | Age: 47
LOS: 0 days | Discharge: HOME | End: 2022-10-09
Attending: STUDENT IN AN ORGANIZED HEALTH CARE EDUCATION/TRAINING PROGRAM

## 2022-10-09 VITALS
OXYGEN SATURATION: 97 % | SYSTOLIC BLOOD PRESSURE: 151 MMHG | RESPIRATION RATE: 19 BRPM | WEIGHT: 261.91 LBS | DIASTOLIC BLOOD PRESSURE: 92 MMHG | TEMPERATURE: 98 F | HEART RATE: 110 BPM | HEIGHT: 74 IN

## 2022-10-09 VITALS
DIASTOLIC BLOOD PRESSURE: 95 MMHG | HEART RATE: 103 BPM | SYSTOLIC BLOOD PRESSURE: 144 MMHG | RESPIRATION RATE: 18 BRPM | TEMPERATURE: 98 F | OXYGEN SATURATION: 97 %

## 2022-10-09 DIAGNOSIS — R06.02 SHORTNESS OF BREATH: ICD-10-CM

## 2022-10-09 DIAGNOSIS — Z20.822 CONTACT WITH AND (SUSPECTED) EXPOSURE TO COVID-19: ICD-10-CM

## 2022-10-09 DIAGNOSIS — E11.9 TYPE 2 DIABETES MELLITUS WITHOUT COMPLICATIONS: ICD-10-CM

## 2022-10-09 DIAGNOSIS — E66.9 OBESITY, UNSPECIFIED: ICD-10-CM

## 2022-10-09 DIAGNOSIS — M31.30 WEGENER'S GRANULOMATOSIS WITHOUT RENAL INVOLVEMENT: Chronic | ICD-10-CM

## 2022-10-09 DIAGNOSIS — Z79.01 LONG TERM (CURRENT) USE OF ANTICOAGULANTS: ICD-10-CM

## 2022-10-09 DIAGNOSIS — I11.0 HYPERTENSIVE HEART DISEASE WITH HEART FAILURE: ICD-10-CM

## 2022-10-09 DIAGNOSIS — Z79.82 LONG TERM (CURRENT) USE OF ASPIRIN: ICD-10-CM

## 2022-10-09 DIAGNOSIS — J30.2 OTHER SEASONAL ALLERGIC RHINITIS: ICD-10-CM

## 2022-10-09 DIAGNOSIS — Z87.19 PERSONAL HISTORY OF OTHER DISEASES OF THE DIGESTIVE SYSTEM: Chronic | ICD-10-CM

## 2022-10-09 DIAGNOSIS — Z88.8 ALLERGY STATUS TO OTHER DRUGS, MEDICAMENTS AND BIOLOGICAL SUBSTANCES STATUS: ICD-10-CM

## 2022-10-09 DIAGNOSIS — Z91.041 RADIOGRAPHIC DYE ALLERGY STATUS: ICD-10-CM

## 2022-10-09 DIAGNOSIS — R00.0 TACHYCARDIA, UNSPECIFIED: ICD-10-CM

## 2022-10-09 DIAGNOSIS — Z79.4 LONG TERM (CURRENT) USE OF INSULIN: ICD-10-CM

## 2022-10-09 DIAGNOSIS — I25.10 ATHEROSCLEROTIC HEART DISEASE OF NATIVE CORONARY ARTERY WITHOUT ANGINA PECTORIS: ICD-10-CM

## 2022-10-09 DIAGNOSIS — E78.5 HYPERLIPIDEMIA, UNSPECIFIED: ICD-10-CM

## 2022-10-09 DIAGNOSIS — I50.9 HEART FAILURE, UNSPECIFIED: ICD-10-CM

## 2022-10-09 DIAGNOSIS — R07.89 OTHER CHEST PAIN: ICD-10-CM

## 2022-10-09 DIAGNOSIS — Z86.711 PERSONAL HISTORY OF PULMONARY EMBOLISM: ICD-10-CM

## 2022-10-09 DIAGNOSIS — Z87.891 PERSONAL HISTORY OF NICOTINE DEPENDENCE: ICD-10-CM

## 2022-10-09 DIAGNOSIS — Z98.52 VASECTOMY STATUS: ICD-10-CM

## 2022-10-09 DIAGNOSIS — Z87.19 PERSONAL HISTORY OF OTHER DISEASES OF THE DIGESTIVE SYSTEM: ICD-10-CM

## 2022-10-09 DIAGNOSIS — Z79.84 LONG TERM (CURRENT) USE OF ORAL HYPOGLYCEMIC DRUGS: ICD-10-CM

## 2022-10-09 DIAGNOSIS — Z86.16 PERSONAL HISTORY OF COVID-19: ICD-10-CM

## 2022-10-09 LAB
ALBUMIN SERPL ELPH-MCNC: 3.3 G/DL — SIGNIFICANT CHANGE UP (ref 3.3–5)
ALP SERPL-CCNC: 44 U/L — SIGNIFICANT CHANGE UP (ref 40–120)
ALT FLD-CCNC: 42 U/L — SIGNIFICANT CHANGE UP (ref 12–78)
ANION GAP SERPL CALC-SCNC: 12 MMOL/L — SIGNIFICANT CHANGE UP (ref 5–17)
AST SERPL-CCNC: 28 U/L — SIGNIFICANT CHANGE UP (ref 15–37)
BASOPHILS # BLD AUTO: 0.02 K/UL — SIGNIFICANT CHANGE UP (ref 0–0.2)
BASOPHILS NFR BLD AUTO: 0.2 % — SIGNIFICANT CHANGE UP (ref 0–2)
BILIRUB SERPL-MCNC: 0.5 MG/DL — SIGNIFICANT CHANGE UP (ref 0.2–1.2)
BUN SERPL-MCNC: 19 MG/DL — SIGNIFICANT CHANGE UP (ref 7–23)
CALCIUM SERPL-MCNC: 9.2 MG/DL — SIGNIFICANT CHANGE UP (ref 8.5–10.1)
CHLORIDE SERPL-SCNC: 106 MMOL/L — SIGNIFICANT CHANGE UP (ref 96–108)
CO2 SERPL-SCNC: 23 MMOL/L — SIGNIFICANT CHANGE UP (ref 22–31)
CREAT SERPL-MCNC: 1.18 MG/DL — SIGNIFICANT CHANGE UP (ref 0.5–1.3)
D DIMER BLD IA.RAPID-MCNC: 207 NG/ML DDU — SIGNIFICANT CHANGE UP
EGFR: 77 ML/MIN/1.73M2 — SIGNIFICANT CHANGE UP
EOSINOPHIL # BLD AUTO: 0.18 K/UL — SIGNIFICANT CHANGE UP (ref 0–0.5)
EOSINOPHIL NFR BLD AUTO: 2.1 % — SIGNIFICANT CHANGE UP (ref 0–6)
GLUCOSE SERPL-MCNC: 245 MG/DL — HIGH (ref 70–99)
HCT VFR BLD CALC: 40.6 % — SIGNIFICANT CHANGE UP (ref 39–50)
HGB BLD-MCNC: 13.4 G/DL — SIGNIFICANT CHANGE UP (ref 13–17)
IMM GRANULOCYTES NFR BLD AUTO: 0.2 % — SIGNIFICANT CHANGE UP (ref 0–0.9)
LYMPHOCYTES # BLD AUTO: 2.47 K/UL — SIGNIFICANT CHANGE UP (ref 1–3.3)
LYMPHOCYTES # BLD AUTO: 29.4 % — SIGNIFICANT CHANGE UP (ref 13–44)
MCHC RBC-ENTMCNC: 30.1 PG — SIGNIFICANT CHANGE UP (ref 27–34)
MCHC RBC-ENTMCNC: 33 G/DL — SIGNIFICANT CHANGE UP (ref 32–36)
MCV RBC AUTO: 91.2 FL — SIGNIFICANT CHANGE UP (ref 80–100)
MONOCYTES # BLD AUTO: 0.54 K/UL — SIGNIFICANT CHANGE UP (ref 0–0.9)
MONOCYTES NFR BLD AUTO: 6.4 % — SIGNIFICANT CHANGE UP (ref 2–14)
NEUTROPHILS # BLD AUTO: 5.18 K/UL — SIGNIFICANT CHANGE UP (ref 1.8–7.4)
NEUTROPHILS NFR BLD AUTO: 61.7 % — SIGNIFICANT CHANGE UP (ref 43–77)
NRBC # BLD: 0 /100 WBCS — SIGNIFICANT CHANGE UP (ref 0–0)
NT-PROBNP SERPL-SCNC: 414 PG/ML — HIGH (ref 0–125)
PLATELET # BLD AUTO: 198 K/UL — SIGNIFICANT CHANGE UP (ref 150–400)
POTASSIUM SERPL-MCNC: 4 MMOL/L — SIGNIFICANT CHANGE UP (ref 3.5–5.3)
POTASSIUM SERPL-SCNC: 4 MMOL/L — SIGNIFICANT CHANGE UP (ref 3.5–5.3)
PROT SERPL-MCNC: 6.7 GM/DL — SIGNIFICANT CHANGE UP (ref 6–8.3)
RAPID RVP RESULT: SIGNIFICANT CHANGE UP
RBC # BLD: 4.45 M/UL — SIGNIFICANT CHANGE UP (ref 4.2–5.8)
RBC # FLD: 12.9 % — SIGNIFICANT CHANGE UP (ref 10.3–14.5)
SARS-COV-2 RNA SPEC QL NAA+PROBE: SIGNIFICANT CHANGE UP
SODIUM SERPL-SCNC: 141 MMOL/L — SIGNIFICANT CHANGE UP (ref 135–145)
TROPONIN I, HIGH SENSITIVITY RESULT: 27.8 NG/L — SIGNIFICANT CHANGE UP
WBC # BLD: 8.41 K/UL — SIGNIFICANT CHANGE UP (ref 3.8–10.5)
WBC # FLD AUTO: 8.41 K/UL — SIGNIFICANT CHANGE UP (ref 3.8–10.5)

## 2022-10-09 PROCEDURE — 71275 CT ANGIOGRAPHY CHEST: CPT | Mod: 26,MA

## 2022-10-09 PROCEDURE — 93010 ELECTROCARDIOGRAM REPORT: CPT

## 2022-10-09 PROCEDURE — 99285 EMERGENCY DEPT VISIT HI MDM: CPT

## 2022-10-09 PROCEDURE — 71045 X-RAY EXAM CHEST 1 VIEW: CPT | Mod: 26

## 2022-10-09 RX ORDER — ATORVASTATIN CALCIUM 80 MG/1
1 TABLET, FILM COATED ORAL
Qty: 0 | Refills: 0 | DISCHARGE

## 2022-10-09 RX ORDER — ASPIRIN/CALCIUM CARB/MAGNESIUM 324 MG
1 TABLET ORAL
Qty: 0 | Refills: 0 | DISCHARGE

## 2022-10-09 RX ORDER — SODIUM CHLORIDE 9 MG/ML
1000 INJECTION INTRAMUSCULAR; INTRAVENOUS; SUBCUTANEOUS ONCE
Refills: 0 | Status: COMPLETED | OUTPATIENT
Start: 2022-10-09 | End: 2022-10-09

## 2022-10-09 RX ORDER — DIPHENHYDRAMINE HCL 50 MG
50 CAPSULE ORAL ONCE
Refills: 0 | Status: COMPLETED | OUTPATIENT
Start: 2022-10-09 | End: 2022-10-09

## 2022-10-09 RX ORDER — INSULIN ASPART 100 [IU]/ML
28 INJECTION, SUSPENSION SUBCUTANEOUS
Qty: 0 | Refills: 0 | DISCHARGE

## 2022-10-09 RX ORDER — FUROSEMIDE 40 MG
20 TABLET ORAL ONCE
Refills: 0 | Status: COMPLETED | OUTPATIENT
Start: 2022-10-09 | End: 2022-10-09

## 2022-10-09 RX ORDER — HYDRALAZINE HCL 50 MG
1 TABLET ORAL
Qty: 0 | Refills: 0 | DISCHARGE

## 2022-10-09 RX ORDER — CARVEDILOL PHOSPHATE 80 MG/1
1 CAPSULE, EXTENDED RELEASE ORAL
Qty: 0 | Refills: 0 | DISCHARGE

## 2022-10-09 RX ORDER — METFORMIN HYDROCHLORIDE 850 MG/1
1 TABLET ORAL
Qty: 0 | Refills: 0 | DISCHARGE

## 2022-10-09 RX ORDER — SACUBITRIL AND VALSARTAN 24; 26 MG/1; MG/1
1 TABLET, FILM COATED ORAL
Qty: 0 | Refills: 0 | DISCHARGE

## 2022-10-09 RX ORDER — FAMOTIDINE 10 MG/ML
0 INJECTION INTRAVENOUS
Qty: 0 | Refills: 1 | DISCHARGE

## 2022-10-09 RX ADMIN — Medication 50 MILLIGRAM(S): at 14:07

## 2022-10-09 RX ADMIN — Medication 20 MILLIGRAM(S): at 10:28

## 2022-10-09 RX ADMIN — SODIUM CHLORIDE 1000 MILLILITER(S): 9 INJECTION INTRAMUSCULAR; INTRAVENOUS; SUBCUTANEOUS at 17:53

## 2022-10-09 RX ADMIN — SODIUM CHLORIDE 1000 MILLILITER(S): 9 INJECTION INTRAMUSCULAR; INTRAVENOUS; SUBCUTANEOUS at 16:51

## 2022-10-09 NOTE — ED PROVIDER NOTE - PHYSICAL EXAMINATION
VITAL SIGNS: I have reviewed nursing notes and confirm.  CONSTITUTIONAL: well-appearing, non-toxic, NAD  SKIN: Warm dry, normal skin turgor  HEAD: NCAT  EYES: EOMI, PERRLA, no scleral icterus  ENT: Moist mucous membranes, normal pharynx   NECK: Supple; non tender. Full ROM.   CARD: tachycardia, no murmurs, rubs or gallops  RESP: clear to ausculation b/l.  No rales, rhonchi, or wheezing.  ABD: soft, + BS, non-tender, non-distended, no rebound or guarding. No CVA tenderness  EXT: Full ROM, no bony tenderness, no pedal edema, no calf tenderness  NEURO: normal motor. normal sensory. non-focal   PSYCH: Cooperative, appropriate.

## 2022-10-09 NOTE — ED ADULT NURSE NOTE - NSICDXPASTMEDICALHX_GEN_ALL_CORE_FT
PAST MEDICAL HISTORY:  CAD (coronary artery disease)     COVID-19 COVID PCR positive on 3/10/21    Diabetes mellitus     GERD (gastroesophageal reflux disease)     Hyperlipidemia     Hypertension     Obesity     Seasonal allergies     Smoking     Wegener's granulomatosis throat, treated surgically in 4/2017, presently on Methotrexate

## 2022-10-09 NOTE — ED PROVIDER NOTE - PATIENT PORTAL LINK FT
You can access the FollowMyHealth Patient Portal offered by  by registering at the following website: [email or web address]. By joining [employer]’s FollowMyHealth portal, you will also be able to view your health information using other applications (apps) compatible with our system.

## 2022-10-09 NOTE — ED ADULT NURSE NOTE - OBJECTIVE STATEMENT
pt presents to ed a&ox4 coughing sob x 3 weeks also c/o chest pressure which started last night . Denies fever or chills hx dm htn

## 2022-10-09 NOTE — ED PROVIDER NOTE - CARE PROVIDER_API CALL
Jere Bender)  Cardiology; Interventional Cardiology  [address], [place] [id number]  Phone: [phone]  Fax: [phone]  Follow Up Time: 4-6 Days

## 2022-10-09 NOTE — ED PROVIDER NOTE - NS ED ROS FT
Constitutional: See HPI.  Eyes: No visual changes, eye pain or discharge. No Photophobia  ENMT: No hearing changes, pain, discharge or infections. No neck pain or stiffness. No limited ROM  Cardiac: see hpi  Respiratory: see hpi   GI: No nausea, vomiting, diarrhea or abdominal pain.  : No dysuria, frequency or burning. No Discharge  MS: No myalgia, muscle weakness, joint pain or back pain.  Neuro: No headache or weakness. No LOC.  Skin: No skin rash.  Except as documented in the HPI, all other systems are negative.

## 2022-10-09 NOTE — ED ADULT TRIAGE NOTE - WEIGHT IN LBS
Left message asking patient to return call.  Please inform patient of RESULTS from Provider below.       Your xray was read as normal by the radiologist. I will wait for your MRI report.    Please call the clinic with any questions you may have.     Have a great day,    Dr. Olson   [phone]

## 2022-10-09 NOTE — ED PROVIDER NOTE - CLINICAL SUMMARY MEDICAL DECISION MAKING FREE TEXT BOX
Rule out ACS, versus CHF exacerbation versus PE we will obtain cardiac enzymes BNP D-dimer screening chest x-ray follow-up labs and imaging and reassess

## 2022-10-09 NOTE — ED PROVIDER NOTE - OBJECTIVE STATEMENT
45M w/ hx of CAD, CHF, HTN, insulin dependent T2DM, hx of Granulomatosis with polyangiitis in throat s/p surgery (s/p methotrexate and rituximab, now off tx), GERD, obesity, PE not on a/c Presents to the ED via EMS for reported shortness of breath intermittent for the past 2 and half weeks, patient was seen by PMD during this time and had 2 negative COVID test, PMD called for course of azithromycin for reported cough with slight phlegm with specks of blood, congestion without alleviation of symptoms.  Patient reports shortness of breath with exertion able to go up half flight to flight of stairs before getting short of breath, denies any lower extremity swelling or calf pain, states feels at times is hard to catch his breath.  Also admits to some chest discomfort, reports pain 2 out of 10, without associated nausea vomiting or diaphoresis.  Patient received DuoNeb by EMS with some minimal alleviation of shortness of breath. Denies long flights/travel.  Reports mother had hx of blood clot.  Denies family hx of hypercoagulable disorders.

## 2022-10-09 NOTE — ED ADULT TRIAGE NOTE - CHIEF COMPLAINT QUOTE
BIBA,  PT C/O sob, kiser on/off x 3 weeks.  seen PMD, 3 weeks ago, tested negative covid, started on Z-pack which completed last monday.  pt denies chest pain/pressure at this time but has had it on/off x 3 weeks.  1 duoneb tx given,  pt states he feels a bit better.

## 2022-10-19 ENCOUNTER — APPOINTMENT (OUTPATIENT)
Dept: CT IMAGING | Facility: IMAGING CENTER | Age: 47
End: 2022-10-19

## 2022-10-24 ENCOUNTER — OUTPATIENT (OUTPATIENT)
Dept: OUTPATIENT SERVICES | Facility: HOSPITAL | Age: 47
LOS: 1 days | End: 2022-10-24
Payer: COMMERCIAL

## 2022-10-24 ENCOUNTER — APPOINTMENT (OUTPATIENT)
Dept: CT IMAGING | Facility: IMAGING CENTER | Age: 47
End: 2022-10-24

## 2022-10-24 DIAGNOSIS — M31.30 WEGENER'S GRANULOMATOSIS WITHOUT RENAL INVOLVEMENT: ICD-10-CM

## 2022-10-24 DIAGNOSIS — Z87.19 PERSONAL HISTORY OF OTHER DISEASES OF THE DIGESTIVE SYSTEM: Chronic | ICD-10-CM

## 2022-10-24 DIAGNOSIS — M31.30 WEGENER'S GRANULOMATOSIS WITHOUT RENAL INVOLVEMENT: Chronic | ICD-10-CM

## 2022-10-24 PROCEDURE — 70491 CT SOFT TISSUE NECK W/DYE: CPT

## 2022-10-24 PROCEDURE — 70491 CT SOFT TISSUE NECK W/DYE: CPT | Mod: 26

## 2022-10-26 ENCOUNTER — APPOINTMENT (OUTPATIENT)
Dept: OTOLARYNGOLOGY | Facility: CLINIC | Age: 47
End: 2022-10-26

## 2022-10-26 VITALS — HEART RATE: 104 BPM | DIASTOLIC BLOOD PRESSURE: 91 MMHG | SYSTOLIC BLOOD PRESSURE: 130 MMHG | OXYGEN SATURATION: 98 %

## 2022-10-26 PROCEDURE — 99214 OFFICE O/P EST MOD 30 MIN: CPT | Mod: 25

## 2022-10-26 PROCEDURE — 31579 LARYNGOSCOPY TELESCOPIC: CPT

## 2022-10-26 NOTE — HISTORY OF PRESENT ILLNESS
[de-identified] : 47 year old male follow up for dysphonia and laryngeal mass.\par reports voice is getting better now. \par Was in the ER about three weeks due to SOB, had a ct scan,  xray and  EKG, normal as per pt.\par Followed up with cardiologist, everything is fine as per pt, BP is under control. \par With the cough has abd pain that radiates started about three weeks ago. \par Has an appt with Rheumatologist tomorrow. \par On prednisone 20mg since 10/12. \par Continues to do budesonide nebulizer BID\par Denies dysphagia, odynophagia, dyspnea. No choking/aspiration. \par No hemoptysis. \par Continues to take famotidine but not the omeprazole. \par Patient denies otalgia, otorrhea, ear infections, hearing loss, tinnitus, dizziness, vertigo, headaches related to hearing. \par no recent infection or fever. \par Had a CT of the neck done 10/24/22. Impression: there is decreased concentric soft tissue thickening involving the subglottic and left glottic regions.There is persistent right glottic soft tissue prominence with effacement of the laryngeal ventricle. \par \par  \par

## 2022-10-26 NOTE — PHYSICAL EXAM
[Midline] : trachea located in midline position [Laryngoscopy Performed] : laryngoscopy was performed, see procedure section for findings [Normal] : no rashes [FreeTextEntry1] : mildly raspy voice, much improved [de-identified] : thrush  [de-identified] : improved voice, moderately raspy, not breathy

## 2022-10-26 NOTE — CONSULT LETTER
[Dear  ___] : Dear  [unfilled], [Consult Letter:] : I had the pleasure of evaluating your patient, [unfilled]. [Please see my note below.] : Please see my note below. [Consult Closing:] : Thank you very much for allowing me to participate in the care of this patient.  If you have any questions, please do not hesitate to contact me. [Sincerely,] : Sincerely, [FreeTextEntry3] : Olvin Onofre MD, PhD\par Chief, Division of Laryngology\par Department of Otolaryngology\par [facility]\par Pediatric Otolaryngology, [facility]\par  of Otolaryngology\par [facility]\par

## 2022-10-27 ENCOUNTER — LABORATORY RESULT (OUTPATIENT)
Age: 47
End: 2022-10-27

## 2022-10-27 ENCOUNTER — APPOINTMENT (OUTPATIENT)
Dept: RHEUMATOLOGY | Facility: CLINIC | Age: 47
End: 2022-10-27

## 2022-10-27 VITALS
TEMPERATURE: 97.6 F | WEIGHT: 260 LBS | HEIGHT: 74 IN | BODY MASS INDEX: 33.37 KG/M2 | HEART RATE: 98 BPM | SYSTOLIC BLOOD PRESSURE: 112 MMHG | OXYGEN SATURATION: 98 % | DIASTOLIC BLOOD PRESSURE: 76 MMHG

## 2022-10-27 PROCEDURE — 99215 OFFICE O/P EST HI 40 MIN: CPT

## 2022-10-27 RX ORDER — OMEPRAZOLE 40 MG/1
40 CAPSULE, DELAYED RELEASE ORAL
Qty: 90 | Refills: 1 | Status: DISCONTINUED | COMMUNITY
Start: 2022-04-27 | End: 2022-10-27

## 2022-10-27 RX ORDER — FAMOTIDINE 20 MG/1
20 TABLET, FILM COATED ORAL
Qty: 30 | Refills: 0 | Status: DISCONTINUED | COMMUNITY
Start: 2019-10-17 | End: 2022-10-27

## 2022-10-27 RX ORDER — INSULIN ASPART 100 [IU]/ML
(70-30) 100 INJECTION, SUSPENSION SUBCUTANEOUS
Qty: 45 | Refills: 0 | Status: DISCONTINUED | COMMUNITY
Start: 2020-02-27 | End: 2022-10-27

## 2022-10-27 RX ORDER — IBUPROFEN 600 MG/1
600 TABLET, FILM COATED ORAL
Qty: 40 | Refills: 0 | Status: DISCONTINUED | COMMUNITY
Start: 2020-12-10 | End: 2022-10-27

## 2022-10-27 RX ORDER — METHOCARBAMOL 500 MG/1
500 TABLET, FILM COATED ORAL 3 TIMES DAILY
Qty: 30 | Refills: 2 | Status: DISCONTINUED | COMMUNITY
Start: 2020-12-10 | End: 2022-10-27

## 2022-10-27 RX ORDER — BUDESONIDE 0.5 MG/2ML
0.5 INHALANT ORAL TWICE DAILY
Qty: 60 | Refills: 0 | Status: DISCONTINUED | COMMUNITY
Start: 2021-06-17 | End: 2022-10-27

## 2022-10-27 NOTE — HISTORY OF PRESENT ILLNESS
[___ Month(s) Ago] : [unfilled] month(s) ago [FreeTextEntry1] : had covid 19 in march 2021 - hospitalized for 3 days with SOb and fever\par had a PE as well - still on blood thinner\par throat was not affected\par no lost of voice\par hurt his back at work last week - has been off since then improving pain - still with ongoing back pain\par has lost 30 labs - on diet and exercise Former smoker

## 2022-10-30 NOTE — PHYSICAL EXAM
[General Appearance - Alert] : alert [General Appearance - In No Acute Distress] : in no acute distress [Neck Appearance] : the appearance of the neck was normal [Neck Cervical Mass (___cm)] : no neck mass was observed [Jugular Venous Distention Increased] : there was no jugular-venous distention [Thyroid Diffuse Enlargement] : the thyroid was not enlarged [Thyroid Nodule] : there were no palpable thyroid nodules [Auscultation Breath Sounds / Voice Sounds] : lungs were clear to auscultation bilaterally [Heart Rate And Rhythm] : heart rate was normal and rhythm regular [Heart Sounds] : normal S1 and S2 [Heart Sounds Gallop] : no gallops [Murmurs] : no murmurs [Heart Sounds Pericardial Friction Rub] : no pericardial rub [Full Pulse] : the pedal pulses are present [Edema] : there was no peripheral edema [Bowel Sounds] : normal bowel sounds [Abdomen Soft] : soft [Abdomen Tenderness] : non-tender [Abdomen Mass (___ Cm)] : no abdominal mass palpated [Cervical Lymph Nodes Enlarged Posterior Bilaterally] : posterior cervical [Cervical Lymph Nodes Enlarged Anterior Bilaterally] : anterior cervical [Supraclavicular Lymph Nodes Enlarged Bilaterally] : supraclavicular [Abnormal Walk] : normal gait [Nail Clubbing] : no clubbing  or cyanosis of the fingernails [Musculoskeletal - Swelling] : no joint swelling seen [Motor Tone] : muscle strength and tone were normal [Skin Color & Pigmentation] : normal skin color and pigmentation [Skin Turgor] : normal skin turgor [] : no rash [Oriented To Time, Place, And Person] : oriented to person, place, and time [Impaired Insight] : insight and judgment were intact [Affect] : the affect was normal [FreeTextEntry1] : mildly red throat with enlarged tonsils

## 2022-10-30 NOTE — HISTORY OF PRESENT ILLNESS
[___ Month(s) Ago] : [unfilled] month(s) ago [FreeTextEntry1] : new onset of SOB for about 1 month, seen by cardiology and cleared - seen at the er and sent home with cough syrup\par started on prednisone 20 mg with complete resolution of his symptoms\par patient is on multiple for the heard now with new onset of palpitations\par  109- 127  - \par \par currently inn CHF - [place] -  [phone]

## 2022-10-30 NOTE — ASSESSMENT
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 CHF - will need to speak with cardiology - previous MRI with areas of DE related to fibrosis\par 3. S/p covid 19 - resolved!\par \par \par \par \par I reviewed previous labs results with patients.\par labs to be done at PCP - will request copy\par Diagnosis and Prognosis discussed\par Continue with current medications\par f/u 6 months\par \par

## 2022-11-04 DIAGNOSIS — I31.8 OTHER SPECIFIED DISEASES OF PERICARDIUM: ICD-10-CM

## 2022-11-04 DIAGNOSIS — M31.30 WEGENER'S GRANULOMATOSIS W/OUT RENAL INVOLVEMENT: ICD-10-CM

## 2022-11-04 LAB
ACE BLD-CCNC: 14 U/L
ALBUMIN SERPL ELPH-MCNC: 4.5 G/DL
ALP BLD-CCNC: 40 U/L
ALT SERPL-CCNC: 21 U/L
ANION GAP SERPL CALC-SCNC: 13 MMOL/L
APPEARANCE: CLEAR
AST SERPL-CCNC: 15 U/L
BASOPHILS # BLD AUTO: 0.06 K/UL
BASOPHILS NFR BLD AUTO: 0.9 %
BILIRUB SERPL-MCNC: 0.5 MG/DL
BILIRUBIN URINE: NEGATIVE
BLOOD URINE: NEGATIVE
BUN SERPL-MCNC: 20 MG/DL
CALCIUM SERPL-MCNC: 9.7 MG/DL
CHLORIDE SERPL-SCNC: 103 MMOL/L
CO2 SERPL-SCNC: 22 MMOL/L
COLOR: NORMAL
CREAT SERPL-MCNC: 1.24 MG/DL
CRP SERPL-MCNC: 4 MG/L
DEPRECATED KAPPA LC FREE/LAMBDA SER: 0.81 RATIO
EGFR: 72 ML/MIN/1.73M2
EOSINOPHIL # BLD AUTO: 0.16 K/UL
EOSINOPHIL NFR BLD AUTO: 2.5 %
ERYTHROCYTE [SEDIMENTATION RATE] IN BLOOD BY WESTERGREN METHOD: 29 MM/HR
ESTIMATED AVERAGE GLUCOSE: 232 MG/DL
ESTIMATED AVERAGE GLUCOSE: 237 MG/DL
GLUCOSE QUALITATIVE U: ABNORMAL
GLUCOSE SERPL-MCNC: 163 MG/DL
HBA1C MFR BLD HPLC: 9.7 %
HBA1C MFR BLD HPLC: 9.9 %
HBV CORE IGG+IGM SER QL: NONREACTIVE
HBV SURFACE AB SER QL: NONREACTIVE
HBV SURFACE AG SER QL: NONREACTIVE
HCT VFR BLD CALC: 46.9 %
HCV AB SER QL: NONREACTIVE
HCV S/CO RATIO: 0.06 S/CO
HGB BLD-MCNC: 14.9 G/DL
IGA SER QL IEP: 366 MG/DL
IGG SER QL IEP: 992 MG/DL
IGM SER QL IEP: 93 MG/DL
IMM GRANULOCYTES NFR BLD AUTO: 0.3 %
KAPPA LC CSF-MCNC: 2.35 MG/DL
KAPPA LC SERPL-MCNC: 1.9 MG/DL
KETONES URINE: NEGATIVE
LEUKOCYTE ESTERASE URINE: NEGATIVE
LYMPHOCYTES # BLD AUTO: 3.08 K/UL
LYMPHOCYTES NFR BLD AUTO: 47.9 %
M TB IFN-G BLD-IMP: NEGATIVE
MAN DIFF?: NORMAL
MCHC RBC-ENTMCNC: 29.8 PG
MCHC RBC-ENTMCNC: 31.8 GM/DL
MCV RBC AUTO: 93.8 FL
MONOCYTES # BLD AUTO: 0.52 K/UL
MONOCYTES NFR BLD AUTO: 8.1 %
NEUTROPHILS # BLD AUTO: 2.59 K/UL
NEUTROPHILS NFR BLD AUTO: 40.3 %
NITRITE URINE: NEGATIVE
PH URINE: 6
PLATELET # BLD AUTO: 254 K/UL
POTASSIUM SERPL-SCNC: 4.8 MMOL/L
PROT SERPL-MCNC: 7.5 G/DL
PROTEIN URINE: NORMAL
QUANTIFERON TB PLUS MITOGEN MINUS NIL: >10 IU/ML
QUANTIFERON TB PLUS NIL: 0.12 IU/ML
QUANTIFERON TB PLUS TB1 MINUS NIL: -0.09 IU/ML
QUANTIFERON TB PLUS TB2 MINUS NIL: -0.07 IU/ML
RBC # BLD: 5 M/UL
RBC # FLD: 13.1 %
SODIUM SERPL-SCNC: 138 MMOL/L
SPECIFIC GRAVITY URINE: 1.03
TSH SERPL-ACNC: 0.8 UIU/ML
UROBILINOGEN URINE: NORMAL
WBC # FLD AUTO: 6.43 K/UL

## 2022-11-30 RX ORDER — DIPHENHYDRAMINE HYDROCHLORIDE 50 MG/ML
50 INJECTION, SOLUTION INTRAMUSCULAR; INTRAVENOUS
Qty: 1 | Refills: 0 | Status: COMPLETED | OUTPATIENT
Start: 2022-11-29 | End: 1900-01-01

## 2022-11-30 RX ORDER — METHYLPREDNISOLONE 125 MG/2ML
125 INJECTION, POWDER, LYOPHILIZED, FOR SOLUTION INTRAMUSCULAR; INTRAVENOUS
Qty: 0 | Refills: 0 | Status: COMPLETED | OUTPATIENT
Start: 2022-11-29 | End: 1900-01-01

## 2022-11-30 RX ORDER — ACETAMINOPHEN 500 MG/1
500 TABLET ORAL
Qty: 0 | Refills: 0 | Status: COMPLETED | OUTPATIENT
Start: 2022-11-29 | End: 1900-01-01

## 2022-11-30 RX ORDER — RITUXIMAB 10 MG/ML
500 INJECTION, SOLUTION INTRAVENOUS
Qty: 0 | Refills: 0 | Status: COMPLETED | OUTPATIENT
Start: 2022-11-29 | End: 1900-01-01

## 2022-12-08 RX ORDER — METHYLPREDNISOLONE 125 MG/2ML
125 INJECTION, POWDER, LYOPHILIZED, FOR SOLUTION INTRAMUSCULAR; INTRAVENOUS
Qty: 0 | Refills: 0 | Status: COMPLETED | OUTPATIENT
Start: 2022-12-08 | End: 1900-01-01

## 2022-12-08 RX ORDER — RITUXIMAB 10 MG/ML
500 INJECTION, SOLUTION INTRAVENOUS
Qty: 0 | Refills: 0 | Status: COMPLETED | OUTPATIENT
Start: 2022-12-08 | End: 1900-01-01

## 2022-12-08 RX ORDER — DIPHENHYDRAMINE HCL 25 MG/1
25 TABLET ORAL
Qty: 0 | Refills: 0 | Status: COMPLETED | OUTPATIENT
Start: 2022-12-08 | End: 1900-01-01

## 2022-12-08 RX ORDER — ACETAMINOPHEN 500 MG/1
500 TABLET ORAL
Qty: 0 | Refills: 0 | Status: COMPLETED | OUTPATIENT
Start: 2022-12-08 | End: 1900-01-01

## 2022-12-15 ENCOUNTER — APPOINTMENT (OUTPATIENT)
Dept: RHEUMATOLOGY | Facility: CLINIC | Age: 47
End: 2022-12-15

## 2022-12-27 ENCOUNTER — LABORATORY RESULT (OUTPATIENT)
Age: 47
End: 2022-12-27

## 2022-12-27 ENCOUNTER — APPOINTMENT (OUTPATIENT)
Dept: RHEUMATOLOGY | Facility: CLINIC | Age: 47
End: 2022-12-27
Payer: COMMERCIAL

## 2022-12-27 VITALS
WEIGHT: 263 LBS | DIASTOLIC BLOOD PRESSURE: 75 MMHG | OXYGEN SATURATION: 98 % | SYSTOLIC BLOOD PRESSURE: 115 MMHG | HEART RATE: 99 BPM | RESPIRATION RATE: 16 BRPM | HEIGHT: 74 IN | BODY MASS INDEX: 33.75 KG/M2

## 2022-12-27 PROCEDURE — 99215 OFFICE O/P EST HI 40 MIN: CPT

## 2022-12-28 NOTE — HISTORY OF PRESENT ILLNESS
[___ Month(s) Ago] : [unfilled] month(s) ago [FreeTextEntry1] : voice is stable with no SOB - Ct scan with mild changes \par recent PT scan with no malignancy - recent diagnosed with MGUS as well -\par feels well overall pending new apt with [place] next month\par off prednisone - no worsening and no active SOB\par DM is still not well controlled\par has been seen by cardiology - does not know current EF pending echo in 2 weeks\par now on eliquis for PE

## 2022-12-28 NOTE — ASSESSMENT
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 CHF - will need to speak with cardiology - previous MRI with areas of DE related to fibrosis - referral for cardiology at [facility]\par 5. S/p covid 19 - resolved!\par 6. MGUS - repeat labs today - PET scan with no abnormalities\par \par \par \par I reviewed previous labs results with patients.\par labs today\par Diagnosis and Prognosis discussed\par Continue with current medications\par f/u 6 months\par \par

## 2022-12-28 NOTE — CONSULT LETTER
[Dear  ___] : Dear  [unfilled], [Consult Letter:] : I had the pleasure of evaluating your patient, [unfilled]. [Please see my note below.] : Please see my note below. [Sincerely,] : Sincerely, [FreeTextEntry3] : Carmen Newman, MSN, ANP-bc/GNP\par Nurse Practitioner\par Division of Rheumatology\par [facility]\par

## 2023-01-04 LAB
ALBUMIN MFR SERPL ELPH: 55.4 %
ALBUMIN SERPL ELPH-MCNC: 4.4 G/DL
ALBUMIN SERPL-MCNC: 3.8 G/DL
ALBUMIN/GLOB SERPL: 1.2 RATIO
ALP BLD-CCNC: 47 U/L
ALPHA1 GLOB MFR SERPL ELPH: 4.9 %
ALPHA1 GLOB SERPL ELPH-MCNC: 0.3 G/DL
ALPHA2 GLOB MFR SERPL ELPH: 11.3 %
ALPHA2 GLOB SERPL ELPH-MCNC: 0.8 G/DL
ALT SERPL-CCNC: 30 U/L
ANION GAP SERPL CALC-SCNC: 9 MMOL/L
APPEARANCE: CLEAR
AST SERPL-CCNC: 17 U/L
B-GLOBULIN MFR SERPL ELPH: 14 %
B-GLOBULIN SERPL ELPH-MCNC: 1 G/DL
BASOPHILS # BLD AUTO: 0.04 K/UL
BASOPHILS NFR BLD AUTO: 0.5 %
BILIRUB SERPL-MCNC: 0.3 MG/DL
BILIRUBIN URINE: NEGATIVE
BLOOD URINE: NEGATIVE
BUN SERPL-MCNC: 11 MG/DL
CALCIUM SERPL-MCNC: 9.9 MG/DL
CHLORIDE SERPL-SCNC: 105 MMOL/L
CO2 SERPL-SCNC: 27 MMOL/L
COLOR: NORMAL
CREAT SERPL-MCNC: 1.13 MG/DL
DEPRECATED KAPPA LC FREE/LAMBDA SER: 0.94 RATIO
EGFR: 80 ML/MIN/1.73M2
EOSINOPHIL # BLD AUTO: 0.15 K/UL
EOSINOPHIL NFR BLD AUTO: 2 %
ESTIMATED AVERAGE GLUCOSE: 220 MG/DL
GAMMA GLOB FLD ELPH-MCNC: 1 G/DL
GAMMA GLOB MFR SERPL ELPH: 14.4 %
GLUCOSE QUALITATIVE U: NEGATIVE
GLUCOSE SERPL-MCNC: 157 MG/DL
HBA1C MFR BLD HPLC: 9.3 %
HCT VFR BLD CALC: 42.7 %
HGB BLD-MCNC: 13.8 G/DL
IGA 24H UR QL IFE: NORMAL
IGA SER QL IEP: 367 MG/DL
IGG SER QL IEP: 935 MG/DL
IGM SER QL IEP: 78 MG/DL
IMM GRANULOCYTES NFR BLD AUTO: 0.4 %
INTERPRETATION SERPL IEP-IMP: NORMAL
KAPPA LC CSF-MCNC: 2.11 MG/DL
KAPPA LC SERPL-MCNC: 1.98 MG/DL
KETONES URINE: NEGATIVE
LEUKOCYTE ESTERASE URINE: NEGATIVE
LYMPHOCYTES # BLD AUTO: 3.11 K/UL
LYMPHOCYTES NFR BLD AUTO: 41.1 %
M PROTEIN SPEC IFE-MCNC: NORMAL
MAN DIFF?: NORMAL
MCHC RBC-ENTMCNC: 29.8 PG
MCHC RBC-ENTMCNC: 32.3 GM/DL
MCV RBC AUTO: 92.2 FL
MONOCYTES # BLD AUTO: 0.58 K/UL
MONOCYTES NFR BLD AUTO: 7.7 %
NEUTROPHILS # BLD AUTO: 3.66 K/UL
NEUTROPHILS NFR BLD AUTO: 48.3 %
NITRITE URINE: NEGATIVE
PH URINE: 6
PLATELET # BLD AUTO: 256 K/UL
POTASSIUM SERPL-SCNC: 4.7 MMOL/L
PROT SERPL-MCNC: 6.8 G/DL
PROT SERPL-MCNC: 6.9 G/DL
PROT SERPL-MCNC: 6.9 G/DL
PROTEIN URINE: NORMAL
RBC # BLD: 4.63 M/UL
RBC # FLD: 13.2 %
SODIUM SERPL-SCNC: 140 MMOL/L
SPECIFIC GRAVITY URINE: 1.02
UROBILINOGEN URINE: NORMAL
WBC # FLD AUTO: 7.57 K/UL

## 2023-01-05 ENCOUNTER — APPOINTMENT (OUTPATIENT)
Dept: RHEUMATOLOGY | Facility: CLINIC | Age: 48
End: 2023-01-05

## 2023-01-25 ENCOUNTER — APPOINTMENT (OUTPATIENT)
Dept: OTOLARYNGOLOGY | Facility: CLINIC | Age: 48
End: 2023-01-25
Payer: COMMERCIAL

## 2023-01-25 VITALS
WEIGHT: 262 LBS | HEART RATE: 89 BPM | BODY MASS INDEX: 33.62 KG/M2 | DIASTOLIC BLOOD PRESSURE: 94 MMHG | SYSTOLIC BLOOD PRESSURE: 149 MMHG | HEIGHT: 74 IN

## 2023-01-25 DIAGNOSIS — R06.2 WHEEZING: ICD-10-CM

## 2023-01-25 DIAGNOSIS — K21.9 GASTRO-ESOPHAGEAL REFLUX DISEASE W/OUT ESOPHAGITIS: ICD-10-CM

## 2023-01-25 DIAGNOSIS — J38.6 STENOSIS OF LARYNX: ICD-10-CM

## 2023-01-25 DIAGNOSIS — U07.1 COVID-19: ICD-10-CM

## 2023-01-25 DIAGNOSIS — R49.0 DYSPHONIA: ICD-10-CM

## 2023-01-25 DIAGNOSIS — R13.12 DYSPHAGIA, OROPHARYNGEAL PHASE: ICD-10-CM

## 2023-01-25 DIAGNOSIS — J31.0 CHRONIC RHINITIS: ICD-10-CM

## 2023-01-25 PROCEDURE — 31579 LARYNGOSCOPY TELESCOPIC: CPT

## 2023-01-25 PROCEDURE — 99214 OFFICE O/P EST MOD 30 MIN: CPT | Mod: 25

## 2023-01-25 NOTE — PHYSICAL EXAM
[Midline] : trachea located in midline position [Laryngoscopy Performed] : laryngoscopy was performed, see procedure section for findings [Normal] : no rashes [FreeTextEntry1] : mildly raspy voice, much improved [de-identified] : thrush  [de-identified] : improved voice, moderately raspy, not breathy

## 2023-01-25 NOTE — HISTORY OF PRESENT ILLNESS
[de-identified] : 47 year old male follow up for dysphonia and laryngeal mass.\par Voice is improved, denies SOB, hemoptysis or epistaxis \par Saw Carmen (rheum) recently, everything is okay as per pt. \par PET CT done in 12/19/2022. Impression: No FDG avid foci suggestive of active myelomatous involvement. \par Denies dysphagia or choking.\par Patient denies otalgia, otorrhea, ear infections, hearing loss, tinnitus, dizziness, vertigo, headaches related to hearing. \par no recent infection or fever.\par Not on reflux medication

## 2023-01-25 NOTE — CONSULT LETTER
[Dear  ___] : Dear  [unfilled], [Consult Letter:] : I had the pleasure of evaluating your patient, [unfilled]. [Please see my note below.] : Please see my note below. [Consult Closing:] : Thank you very much for allowing me to participate in the care of this patient.  If you have any questions, please do not hesitate to contact me. [Sincerely,] : Sincerely, [FreeTextEntry3] : Olvin Onofre MD, PhD\par Chief, Division of Laryngology\par Department of Otolaryngology\par [facility]\par Pediatric Otolaryngology, [facility]\par  of Otolaryngology\par [facility]\par

## 2023-01-27 RX ORDER — BLOOD SUGAR DIAGNOSTIC
STRIP MISCELLANEOUS
Qty: 300 | Refills: 0 | Status: DISCONTINUED | COMMUNITY
Start: 2017-11-01 | End: 2023-01-27

## 2023-01-27 RX ORDER — LANCETS 28 GAUGE
EACH MISCELLANEOUS
Qty: 300 | Refills: 0 | Status: DISCONTINUED | COMMUNITY
Start: 2018-10-15 | End: 2023-01-27

## 2023-01-27 RX ORDER — POLYETHYLENE GLYCOL 3350, SODIUM CHLORIDE, SODIUM BICARBONATE AND POTASSIUM CHLORIDE WITH LEMON FLAVOR 420; 11.2; 5.72; 1.48 G/4L; G/4L; G/4L; G/4L
420 POWDER, FOR SOLUTION ORAL
Qty: 4000 | Refills: 0 | Status: DISCONTINUED | COMMUNITY
Start: 2020-12-15 | End: 2023-01-27

## 2023-01-27 RX ORDER — FAMOTIDINE 40 MG/1
40 TABLET, FILM COATED ORAL TWICE DAILY
Qty: 180 | Refills: 0 | Status: DISCONTINUED | COMMUNITY
Start: 2022-04-27 | End: 2023-01-27

## 2023-01-27 RX ORDER — PEN NEEDLE, DIABETIC 33 GX5/32"
33G X 4 MM NEEDLE, DISPOSABLE MISCELLANEOUS
Qty: 200 | Refills: 0 | Status: DISCONTINUED | COMMUNITY
Start: 2020-07-16 | End: 2023-01-27

## 2023-01-27 RX ORDER — BLOOD-GLUCOSE METER
KIT MISCELLANEOUS
Qty: 1 | Refills: 0 | Status: DISCONTINUED | COMMUNITY
Start: 2017-10-31 | End: 2023-01-27

## 2023-01-27 RX ORDER — ISOPROPYL ALCOHOL 0.75 G/1
SWAB TOPICAL
Qty: 300 | Refills: 0 | Status: DISCONTINUED | COMMUNITY
Start: 2018-07-09 | End: 2023-01-27

## 2023-01-27 RX ORDER — RITUXIMAB 10 MG/ML
500 INJECTION, SOLUTION INTRAVENOUS
Qty: 2 | Refills: 0 | Status: DISCONTINUED | OUTPATIENT
Start: 2018-07-06 | End: 2023-01-27

## 2023-01-27 RX ORDER — OFLOXACIN OTIC 3 MG/ML
0.3 SOLUTION AURICULAR (OTIC)
Qty: 5 | Refills: 0 | Status: DISCONTINUED | COMMUNITY
Start: 2020-02-22 | End: 2023-01-27

## 2023-01-27 RX ORDER — PEN NEEDLE, DIABETIC 29 G X1/2"
31G X 5 MM NEEDLE, DISPOSABLE MISCELLANEOUS
Qty: 100 | Refills: 0 | Status: DISCONTINUED | COMMUNITY
Start: 2017-10-31 | End: 2023-01-27

## 2023-01-30 ENCOUNTER — APPOINTMENT (OUTPATIENT)
Dept: HEART FAILURE | Facility: CLINIC | Age: 48
End: 2023-01-30
Payer: COMMERCIAL

## 2023-01-30 VITALS
SYSTOLIC BLOOD PRESSURE: 133 MMHG | TEMPERATURE: 97.8 F | DIASTOLIC BLOOD PRESSURE: 85 MMHG | WEIGHT: 269 LBS | HEART RATE: 91 BPM | BODY MASS INDEX: 34.52 KG/M2 | HEIGHT: 74 IN | OXYGEN SATURATION: 97 %

## 2023-01-30 DIAGNOSIS — I50.20 UNSPECIFIED SYSTOLIC (CONGESTIVE) HEART FAILURE: ICD-10-CM

## 2023-01-30 DIAGNOSIS — I42.8 OTHER CARDIOMYOPATHIES: ICD-10-CM

## 2023-01-30 DIAGNOSIS — E11.65 TYPE 2 DIABETES MELLITUS WITH HYPERGLYCEMIA: ICD-10-CM

## 2023-01-30 DIAGNOSIS — E78.5 HYPERLIPIDEMIA, UNSPECIFIED: ICD-10-CM

## 2023-01-30 PROCEDURE — 93000 ELECTROCARDIOGRAM COMPLETE: CPT

## 2023-01-30 PROCEDURE — 99205 OFFICE O/P NEW HI 60 MIN: CPT | Mod: 25

## 2023-01-30 RX ORDER — HYDRALAZINE HYDROCHLORIDE 25 MG/1
25 TABLET ORAL
Qty: 30 | Refills: 3 | Status: ACTIVE | COMMUNITY
Start: 2020-04-09

## 2023-01-30 RX ORDER — CARVEDILOL 25 MG/1
25 TABLET, FILM COATED ORAL TWICE DAILY
Qty: 120 | Refills: 0 | Status: ACTIVE | COMMUNITY
Start: 2019-03-08

## 2023-01-30 RX ORDER — ENALAPRIL MALEATE 20 MG/1
20 TABLET ORAL DAILY
Refills: 0 | Status: COMPLETED | COMMUNITY
Start: 2019-03-08 | End: 2023-01-30

## 2023-01-30 RX ORDER — SPIRONOLACTONE 25 MG/1
25 TABLET ORAL TWICE DAILY
Qty: 10 | Refills: 1 | Status: ACTIVE | COMMUNITY
Start: 2020-08-26

## 2023-01-30 RX ORDER — SACUBITRIL AND VALSARTAN 97; 103 MG/1; MG/1
97-103 TABLET, FILM COATED ORAL
Qty: 180 | Refills: 0 | Status: ACTIVE | COMMUNITY
Start: 2020-04-09

## 2023-01-30 RX ORDER — ATORVASTATIN CALCIUM 40 MG/1
40 TABLET, FILM COATED ORAL
Qty: 30 | Refills: 3 | Status: ACTIVE | COMMUNITY
Start: 2020-01-17

## 2023-01-30 RX ORDER — PREDNISONE 20 MG/1
20 TABLET ORAL
Qty: 30 | Refills: 1 | Status: COMPLETED | COMMUNITY
Start: 2022-10-12 | End: 2023-01-30

## 2023-01-30 RX ORDER — EMPAGLIFLOZIN 10 MG/1
10 TABLET, FILM COATED ORAL DAILY
Qty: 30 | Refills: 3 | Status: COMPLETED | COMMUNITY
Start: 2021-05-12 | End: 2023-01-30

## 2023-02-03 NOTE — HISTORY OF PRESENT ILLNESS
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 Referred by Dr. Carmen Newman, primary cardiologist Dr. Dr. Prasanna Overton.\par \par Initially found to have new onset cardiomyopathy with reduced LVEF in April 2018 after having abnormal ECG in his PMD office and TTE revealed EF of 20-25% (report not available in health system records), subsequently underwent LHC here at [facility] which demonstrated non-obstructive CAD. He was started on GDMT at that time, prior to that had unlimited baseline ET and has had no HF hospitalizations.\par \par He was found to have a subglottic mass in 2016 (when experienced dysphonia) and diagnosed with Granulomatosis with polyangiitis and began treatment with Rituximab (2018 stopped infusion since 2020), methotrexate and prednisone (no longer taking). Recently underwent cMRI at OSH which demonstrated areas of delayed enhancement (?sarcoid). Underwent non-cardiac PET-CT no FDG.\par \par He presents to clinic today and states he has felt well. He endorses an unlimited AT and can climb stairs without limitations. Does not take BP and weight at home. Denies orthopnea, no PND, no LE edema. No LH/Dizziness, denies CP/Palpitations. His appetite has been good and he limits sodium in is diet. Has not been admitted for HF \par \par Contracted COVID-19 in March 2021, developed RLL PE shortly after. \par \par Of note had reaction to Jardiance (Hives)

## 2023-02-03 NOTE — PHYSICAL EXAM
[Well Nourished] : well nourished [No Acute Distress] : no acute distress [Normal S1, S2] : normal S1, S2 [No Gallop] : no gallop [Clear Lung Fields] : clear lung fields [Good Air Entry] : good air entry [Soft] : abdomen soft [Normal Gait] : normal gait [No Edema] : no edema [No Rash] : no rash [Moves all extremities] : moves all extremities [Alert and Oriented] : alert and oriented [de-identified] : No JVD on exam [de-identified] : warm peripherally

## 2023-02-03 NOTE — ASSESSMENT
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 He is ACC/AHA Stage C HF endorsing NYHA class I symptoms. he appears euvolemic on exam. While he does not have definitive features of sarcoid this cannot be excluded in the etiology of his cardiomyopathy.\par \par #NICM/HFrEF\par -  Low suspicion of cardiac sarcoidosis; Can pursue cardiac PET-CT. Scheduling per Dr. Overton.\par - Counseled on disease process\par - Continue Entresto  mg BID, Coreg 25 mg BID, Spironolactone 25 mg BID and Hydralazine 25 mg BID. Intolerant of Jardiance in past, can consider trial Farxiga if OK with Endocrinologist.\par - He will begin to monitor and log daily weight and BP readings. \par -Diuretics: Euvolemic off diuretics\par -Device: Will need repeat TTE to assess the need for primary prevention ICD if LVEF remains </= 35%; Scheduled with Dr. Overton.\par -Labs:1/4/23 Na 140, K+ 4.7, BUN/Cr 11/1/1, nml LFT's, \par - Encouraged ongoing dietary management for weight loss, discussed cardiac benefits\par \par #HTN\par - Continue GDMT as above\par \par #DM poorly controlled A1c 9.3% as of 12/22'\par - Currently taking Metformin, Glipizide and Lantus\par - Follows endocrinologist Dr. Quezada; Will discuss possible re-trialing SGLT2i induction with Farxiga. \par \par #PE\par - Currently on AC with Eliquis\par \par RTC in 6 months with Dr. Booth

## 2023-02-03 NOTE — CARDIOLOGY SUMMARY
[de-identified] : \par 1/30/23 ECG: NSR, HR 92 bpm [de-identified] : \par 3/2021 TTE: LVIDd 6.1cm, LVEF 40% (35% by review), global LV dysfunction, RV not well visualized, grossly nml RVSF, nml BiAtria, no valvulopathies reported\par \par Per chart history TTE 4/11/2018 - Dilated LV size with reduced function EF 20-25%. RV normal size and function. Mild LA enlargement. [de-identified] : \par 4/2018 LHC: pLAD 20%, otherwise nml coronaries; mild nonobstructive CAD

## 2023-02-17 ENCOUNTER — APPOINTMENT (OUTPATIENT)
Dept: ENDOCRINOLOGY | Facility: CLINIC | Age: 48
End: 2023-02-17
Payer: COMMERCIAL

## 2023-02-17 VITALS
BODY MASS INDEX: 34.52 KG/M2 | HEART RATE: 100 BPM | WEIGHT: 269 LBS | SYSTOLIC BLOOD PRESSURE: 146 MMHG | TEMPERATURE: 98.9 F | DIASTOLIC BLOOD PRESSURE: 78 MMHG | OXYGEN SATURATION: 97 % | HEIGHT: 74 IN

## 2023-02-17 LAB
GLUCOSE BLDC GLUCOMTR-MCNC: 186
HBA1C MFR BLD HPLC: 9.4

## 2023-02-17 PROCEDURE — 99204 OFFICE O/P NEW MOD 45 MIN: CPT | Mod: 25

## 2023-02-17 PROCEDURE — 83036 HEMOGLOBIN GLYCOSYLATED A1C: CPT | Mod: QW

## 2023-02-17 PROCEDURE — 95250 CONT GLUC MNTR PHYS/QHP EQP: CPT

## 2023-02-17 RX ORDER — INSULIN GLARGINE 100 [IU]/ML
100 INJECTION, SOLUTION SUBCUTANEOUS
Qty: 1 | Refills: 3 | Status: DISCONTINUED | COMMUNITY
Start: 2019-03-08 | End: 2023-02-17

## 2023-02-17 RX ORDER — GLIPIZIDE 10 MG/1
10 TABLET ORAL
Refills: 0 | Status: DISCONTINUED | COMMUNITY
End: 2023-02-17

## 2023-02-17 NOTE — HISTORY OF PRESENT ILLNESS
[FreeTextEntry1] : 47 year M here for assessment for Type 2 diabetes mellitus\par \par Patient with hx of CHF EF 40%, CAD, HTN, GERD, Obesity, Granulomatosis with polyangitis s/p MTX and rituximab + surgery. \par \par Self-reported weight gain\par Thirst and frequent urination:  no \par Vision problems: stable \par High blood pressure: yes \par Extreme hunger: no \par Frequent and/or recurring urinary infections: no \par \par  \par Screening \par Ophthalmology: follows\par LDL: on lipitor 40mg po daily\par EGFR: 80\par \par \par Current diabetic medication regimen (verified with patient): \par \par metformin 1 g po bid\par glpizide 10mg po bid \par humalog mixed 34 units Q am 30 units pm\par \par Allergy to Januvia (hives) and allergy to jardiance (hives) reported\par \par Was taken off lantus: he is unsure why \par \par \par \par SMBG ranges (glucometer): not performed in recent months\par Feels hypoglycemic symptoms at time, last checked was 47mg/dl in the past \par \par

## 2023-02-17 NOTE — PHYSICAL EXAM
[Alert] : alert [Well Nourished] : well nourished [Obese] : obese [No Acute Distress] : no acute distress [Well Developed] : well developed [Normal Sclera/Conjunctiva] : normal sclera/conjunctiva [EOMI] : extra ocular movement intact [No Proptosis] : no proptosis [Normal Oropharynx] : the oropharynx was normal [Thyroid Not Enlarged] : the thyroid was not enlarged [No Respiratory Distress] : no respiratory distress [No Accessory Muscle Use] : no accessory muscle use [Clear to Auscultation] : lungs were clear to auscultation bilaterally [Normal S1, S2] : normal S1 and S2 [Normal Rate] : heart rate was normal [Regular Rhythm] : with a regular rhythm [No Edema] : no peripheral edema [Pedal Pulses Normal] : the pedal pulses are present [Normal Bowel Sounds] : normal bowel sounds [Not Tender] : non-tender [Not Distended] : not distended [Soft] : abdomen soft [Normal Anterior Cervical Nodes] : no anterior cervical lymphadenopathy [Normal Posterior Cervical Nodes] : no posterior cervical lymphadenopathy [No Spinal Tenderness] : no spinal tenderness [Spine Straight] : spine straight [No Stigmata of Cushings Syndrome] : no stigmata of Cushings Syndrome [Normal Gait] : normal gait [Normal Strength/Tone] : muscle strength and tone were normal [No Rash] : no rash [Acanthosis Nigricans] : no acanthosis nigricans [Normal Reflexes] : deep tendon reflexes were 2+ and symmetric [No Tremors] : no tremors [Oriented x3] : oriented to person, place, and time

## 2023-02-28 ENCOUNTER — APPOINTMENT (OUTPATIENT)
Dept: RHEUMATOLOGY | Facility: CLINIC | Age: 48
End: 2023-02-28
Payer: COMMERCIAL

## 2023-02-28 VITALS
TEMPERATURE: 98.1 F | BODY MASS INDEX: 34.52 KG/M2 | HEIGHT: 74 IN | WEIGHT: 269 LBS | SYSTOLIC BLOOD PRESSURE: 154 MMHG | DIASTOLIC BLOOD PRESSURE: 89 MMHG | OXYGEN SATURATION: 98 % | HEART RATE: 97 BPM

## 2023-02-28 PROCEDURE — 99213 OFFICE O/P EST LOW 20 MIN: CPT

## 2023-02-28 NOTE — ASSESSMENT
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 CHF - will need to speak with cardiology - had PET scan done at [facility] -  - pending results\par 4. S/p covid 19 - resolved!\par 5. MGUS  -  last labs with no signs of MGUS\par \par \par \par I reviewed previous labs results with patients.\par stable disease at this time. \par Diagnosis and Prognosis discussed\par Continue with current medications\par f/u 4 months\par \par

## 2023-02-28 NOTE — HISTORY OF PRESENT ILLNESS
[___ Month(s) Ago] : [unfilled] month(s) ago [FreeTextEntry1] : voice is stable with no SOB - Ct scan with mild changes \par recent PT scan with no malignancy - recent diagnosed with MGUS as well -\par seen by ina -  normal exam on last visit - has f.u in 04/2023\par s/p rituximab 11/2022 [None] : The patient is currently asymptomatic

## 2023-02-28 NOTE — PHYSICAL EXAM
[General Appearance - Alert] : alert [General Appearance - In No Acute Distress] : in no acute distress [FreeTextEntry1] : mildly red throat with enlarged tonsils  [Neck Appearance] : the appearance of the neck was normal [Neck Cervical Mass (___cm)] : no neck mass was observed [Jugular Venous Distention Increased] : there was no jugular-venous distention [Thyroid Diffuse Enlargement] : the thyroid was not enlarged [Thyroid Nodule] : there were no palpable thyroid nodules [Auscultation Breath Sounds / Voice Sounds] : lungs were clear to auscultation bilaterally [Heart Rate And Rhythm] : heart rate was normal and rhythm regular [Heart Sounds] : normal S1 and S2 [Heart Sounds Gallop] : no gallops [Murmurs] : no murmurs [Heart Sounds Pericardial Friction Rub] : no pericardial rub [Full Pulse] : the pedal pulses are present [Edema] : there was no peripheral edema [Bowel Sounds] : normal bowel sounds [Abdomen Soft] : soft [Abdomen Tenderness] : non-tender [Abdomen Mass (___ Cm)] : no abdominal mass palpated [Cervical Lymph Nodes Enlarged Posterior Bilaterally] : posterior cervical [Cervical Lymph Nodes Enlarged Anterior Bilaterally] : anterior cervical [Supraclavicular Lymph Nodes Enlarged Bilaterally] : supraclavicular [Abnormal Walk] : normal gait [Nail Clubbing] : no clubbing  or cyanosis of the fingernails [Musculoskeletal - Swelling] : no joint swelling seen [Motor Tone] : muscle strength and tone were normal [Skin Color & Pigmentation] : normal skin color and pigmentation [Skin Turgor] : normal skin turgor [] : no rash [Oriented To Time, Place, And Person] : oriented to person, place, and time [Impaired Insight] : insight and judgment were intact [Affect] : the affect was normal

## 2023-03-10 ENCOUNTER — APPOINTMENT (OUTPATIENT)
Dept: ENDOCRINOLOGY | Facility: CLINIC | Age: 48
End: 2023-03-10
Payer: COMMERCIAL

## 2023-03-10 VITALS
DIASTOLIC BLOOD PRESSURE: 70 MMHG | BODY MASS INDEX: 34.39 KG/M2 | OXYGEN SATURATION: 98 % | SYSTOLIC BLOOD PRESSURE: 110 MMHG | WEIGHT: 268 LBS | HEIGHT: 74 IN | TEMPERATURE: 98.5 F | HEART RATE: 90 BPM

## 2023-03-10 LAB — GLUCOSE BLDC GLUCOMTR-MCNC: 67

## 2023-03-10 PROCEDURE — 82962 GLUCOSE BLOOD TEST: CPT

## 2023-03-10 PROCEDURE — 99214 OFFICE O/P EST MOD 30 MIN: CPT | Mod: 25

## 2023-03-10 PROCEDURE — 95251 CONT GLUC MNTR ANALYSIS I&R: CPT

## 2023-03-10 NOTE — PHYSICAL EXAM
[Alert] : alert [Well Nourished] : well nourished [Obese] : obese [No Acute Distress] : no acute distress [Well Developed] : well developed [Normal Sclera/Conjunctiva] : normal sclera/conjunctiva [EOMI] : extra ocular movement intact [No Proptosis] : no proptosis [Normal Oropharynx] : the oropharynx was normal [Thyroid Not Enlarged] : the thyroid was not enlarged [No Respiratory Distress] : no respiratory distress [No Accessory Muscle Use] : no accessory muscle use [Clear to Auscultation] : lungs were clear to auscultation bilaterally [Normal S1, S2] : normal S1 and S2 [Normal Rate] : heart rate was normal [No Edema] : no peripheral edema [Regular Rhythm] : with a regular rhythm [Pedal Pulses Normal] : the pedal pulses are present [Normal Bowel Sounds] : normal bowel sounds [Not Tender] : non-tender [Not Distended] : not distended [Soft] : abdomen soft [Normal Anterior Cervical Nodes] : no anterior cervical lymphadenopathy [No Spinal Tenderness] : no spinal tenderness [Spine Straight] : spine straight [No Stigmata of Cushings Syndrome] : no stigmata of Cushings Syndrome [Normal Gait] : normal gait [Normal Strength/Tone] : muscle strength and tone were normal [No Rash] : no rash [Acanthosis Nigricans] : no acanthosis nigricans [Normal Reflexes] : deep tendon reflexes were 2+ and symmetric [No Tremors] : no tremors [Oriented x3] : oriented to person, place, and time

## 2023-03-10 NOTE — HISTORY OF PRESENT ILLNESS
[FreeTextEntry1] : 47 year M here for assessment for f/up Type 2 diabetes mellitus\par \par Patient with hx of CHF EF 40%, CAD, HTN, GERD, Obesity, Granulomatosis with polyangitis s/p MTX and rituximab + surgery. \par \par \par Screening \par Ophthalmology: follows\par LDL: on lipitor 40mg po daily\par EGFR: 80\par \par \par Current diabetic medication regimen (verified with patient): \par \par metformin 1 g po bid\par glpizide 10mg po bid \par humalog mixed 34 units Q am 30 units pm\par \par Allergy to Januvia (hives) and allergy to jardiance (hives) reported\par \par Was taken off lantus: he is unsure why \par \par \par Ambulatory glucose profile (AGP):  \par \par Device: Abbott Freestyle Leah 2\par \par Glucose Management Indicator (GMI): 6.8% \par Average Bmg/dl \par \par TIR:  \par TIR >250 mg/dl: 4% \par TIR >180mg/dl: 20% \par TIR 70 to 180mg/dl: 73% \par TIR <70mg/dl: 3% \par TIR <54mg/dl: 0 % \par \par Coefficient of variation: 35.3%\par \par Time (%) CGM active: 90\par \par \par

## 2023-05-01 ENCOUNTER — APPOINTMENT (OUTPATIENT)
Dept: ENDOCRINOLOGY | Facility: CLINIC | Age: 48
End: 2023-05-01

## 2023-05-24 ENCOUNTER — APPOINTMENT (OUTPATIENT)
Dept: OTOLARYNGOLOGY | Facility: CLINIC | Age: 48
End: 2023-05-24

## 2023-06-20 ENCOUNTER — APPOINTMENT (OUTPATIENT)
Dept: RHEUMATOLOGY | Facility: CLINIC | Age: 48
End: 2023-06-20
Payer: COMMERCIAL

## 2023-06-20 VITALS
SYSTOLIC BLOOD PRESSURE: 144 MMHG | WEIGHT: 258 LBS | BODY MASS INDEX: 33.11 KG/M2 | DIASTOLIC BLOOD PRESSURE: 95 MMHG | HEART RATE: 106 BPM | OXYGEN SATURATION: 98 % | HEIGHT: 74 IN

## 2023-06-20 DIAGNOSIS — M75.100 UNSPECIFIED ROTATOR CUFF TEAR OR RUPTURE OF UNSPECIFIED SHOULDER, NOT SPECIFIED AS TRAUMATIC: ICD-10-CM

## 2023-06-20 PROCEDURE — 99214 OFFICE O/P EST MOD 30 MIN: CPT

## 2023-06-20 NOTE — HISTORY OF PRESENT ILLNESS
[___ Month(s) Ago] : [unfilled] month(s) ago [None] : The patient is currently asymptomatic [FreeTextEntry1] : voice is stable with no SOB - Ct scan with mild changes \par recent PT scan with no malignancy - recent diagnosed with MGUS as well -\par seen by ina -  normal exam on last visit - has f.u in 04/2023\par s/p rituximab 11/2022\par \par Endo visit on 3/10/2023:patient started on Free Style Leah 2 glucose monitor.\par Cardio visit 1/30/2023: reports that everything is good.

## 2023-06-20 NOTE — PHYSICAL EXAM
[General Appearance - Alert] : alert [General Appearance - In No Acute Distress] : in no acute distress [Neck Appearance] : the appearance of the neck was normal [Neck Cervical Mass (___cm)] : no neck mass was observed [Jugular Venous Distention Increased] : there was no jugular-venous distention [Thyroid Diffuse Enlargement] : the thyroid was not enlarged [Thyroid Nodule] : there were no palpable thyroid nodules [Auscultation Breath Sounds / Voice Sounds] : lungs were clear to auscultation bilaterally [Heart Rate And Rhythm] : heart rate was normal and rhythm regular [Heart Sounds] : normal S1 and S2 [Heart Sounds Gallop] : no gallops [Murmurs] : no murmurs [Heart Sounds Pericardial Friction Rub] : no pericardial rub [Full Pulse] : the pedal pulses are present [Edema] : there was no peripheral edema [Bowel Sounds] : normal bowel sounds [Abdomen Soft] : soft [Abdomen Tenderness] : non-tender [Abdomen Mass (___ Cm)] : no abdominal mass palpated [Cervical Lymph Nodes Enlarged Posterior Bilaterally] : posterior cervical [Cervical Lymph Nodes Enlarged Anterior Bilaterally] : anterior cervical [Supraclavicular Lymph Nodes Enlarged Bilaterally] : supraclavicular [Abnormal Walk] : normal gait [Nail Clubbing] : no clubbing  or cyanosis of the fingernails [Musculoskeletal - Swelling] : no joint swelling seen [Motor Tone] : muscle strength and tone were normal [Skin Color & Pigmentation] : normal skin color and pigmentation [Skin Turgor] : normal skin turgor [] : no rash [Oriented To Time, Place, And Person] : oriented to person, place, and time [Impaired Insight] : insight and judgment were intact [Affect] : the affect was normal [FreeTextEntry1] : all joints with full ROM - left shoulder with positive empty can sign - bilateral elbow lateral epicondylitis

## 2023-06-20 NOTE — ASSESSMENT
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 CHF - will need to speak with cardiology -  Pet scan with no enhancement\par 4. S/p covid 19 - resolved!\par 5. MGUS  - normal now\par 6. left shoulder pain - send for US\par 7. bilateral elbow epicondylitis  - start diclofenac\par \par labs done on 04/03/2023 - normal cbc/cmp and TSH\par \par I reviewed previous labs results with patients.\par stable disease at this time. \par Diagnosis and Prognosis discussed\par Continue with current medications\par f/u 4 months\par \par

## 2023-07-07 RX ORDER — DICLOFENAC SODIUM 1% 10 MG/G
1 GEL TOPICAL DAILY
Qty: 1 | Refills: 6 | Status: ACTIVE | COMMUNITY
Start: 2023-06-20 | End: 1900-01-01

## 2023-08-03 ENCOUNTER — TRANSCRIPTION ENCOUNTER (OUTPATIENT)
Age: 48
End: 2023-08-03

## 2023-08-04 RX ORDER — BLOOD-GLUCOSE METER
70 EACH MISCELLANEOUS
Qty: 360 | Refills: 2 | Status: DISCONTINUED | COMMUNITY
Start: 2023-02-17 | End: 2023-08-04

## 2023-08-08 NOTE — PATIENT PROFILE ADULT. - TEACHING/LEARNING LEARNING PREFERENCES
[facility]  ORTHOSURG 5 [facility]  Scheduled Appointment: 08/23/2023     verbal instruction

## 2023-10-17 ENCOUNTER — APPOINTMENT (OUTPATIENT)
Dept: RHEUMATOLOGY | Facility: CLINIC | Age: 48
End: 2023-10-17
Payer: COMMERCIAL

## 2023-10-17 VITALS
OXYGEN SATURATION: 98 % | TEMPERATURE: 98.2 F | WEIGHT: 262 LBS | BODY MASS INDEX: 33.62 KG/M2 | RESPIRATION RATE: 16 BRPM | HEART RATE: 94 BPM | HEIGHT: 74 IN | DIASTOLIC BLOOD PRESSURE: 95 MMHG | SYSTOLIC BLOOD PRESSURE: 146 MMHG

## 2023-10-17 DIAGNOSIS — M31.30 WEGENER'S GRANULOMATOSIS W/OUT RENAL INVOLVEMENT: ICD-10-CM

## 2023-10-17 PROCEDURE — 99214 OFFICE O/P EST MOD 30 MIN: CPT

## 2023-10-18 LAB
ALBUMIN MFR SERPL ELPH: 55.8 %
ALBUMIN SERPL ELPH-MCNC: 4.1 G/DL
ALBUMIN SERPL-MCNC: 3.9 G/DL
ALBUMIN/GLOB SERPL: 1.3 RATIO
ALP BLD-CCNC: 48 U/L
ALPHA1 GLOB MFR SERPL ELPH: 4.2 %
ALPHA1 GLOB SERPL ELPH-MCNC: 0.3 G/DL
ALPHA2 GLOB MFR SERPL ELPH: 10.4 %
ALPHA2 GLOB SERPL ELPH-MCNC: 0.7 G/DL
ALT SERPL-CCNC: 20 U/L
ANION GAP SERPL CALC-SCNC: 12 MMOL/L
AST SERPL-CCNC: 14 U/L
B-GLOBULIN MFR SERPL ELPH: 14.2 %
B-GLOBULIN SERPL ELPH-MCNC: 1 G/DL
BILIRUB SERPL-MCNC: 0.3 MG/DL
BUN SERPL-MCNC: 13 MG/DL
CALCIUM SERPL-MCNC: 9.5 MG/DL
CHLORIDE SERPL-SCNC: 102 MMOL/L
CO2 SERPL-SCNC: 25 MMOL/L
CREAT SERPL-MCNC: 1.02 MG/DL
CRP SERPL-MCNC: 5 MG/L
DEPRECATED KAPPA LC FREE/LAMBDA SER: 1.11 RATIO
EGFR: 91 ML/MIN/1.73M2
ERYTHROCYTE [SEDIMENTATION RATE] IN BLOOD BY WESTERGREN METHOD: 30 MM/HR
GAMMA GLOB FLD ELPH-MCNC: 1.1 G/DL
GAMMA GLOB MFR SERPL ELPH: 15.4 %
GLUCOSE SERPL-MCNC: 155 MG/DL
HCT VFR BLD CALC: 45.1 %
HGB BLD-MCNC: 14.6 G/DL
IGA SER QL IEP: 456 MG/DL
IGG SER QL IEP: 1150 MG/DL
IGM SER QL IEP: 104 MG/DL
INTERPRETATION SERPL IEP-IMP: NORMAL
KAPPA LC CSF-MCNC: 2.09 MG/DL
KAPPA LC SERPL-MCNC: 2.32 MG/DL
M PROTEIN SPEC IFE-MCNC: NORMAL
MCHC RBC-ENTMCNC: 30 PG
MCHC RBC-ENTMCNC: 32.4 GM/DL
MCV RBC AUTO: 92.8 FL
PLATELET # BLD AUTO: 241 K/UL
POTASSIUM SERPL-SCNC: 4.4 MMOL/L
PROT SERPL-MCNC: 6.9 G/DL
RBC # BLD: 4.86 M/UL
RBC # FLD: 13.7 %
SODIUM SERPL-SCNC: 139 MMOL/L
WBC # FLD AUTO: 8.76 K/UL

## 2023-11-02 ENCOUNTER — APPOINTMENT (OUTPATIENT)
Dept: HEART FAILURE | Facility: CLINIC | Age: 48
End: 2023-11-02

## 2023-11-07 ENCOUNTER — NON-APPOINTMENT (OUTPATIENT)
Age: 48
End: 2023-11-07

## 2023-12-27 NOTE — PROGRESS NOTE ADULT - PROBLEM SELECTOR PROBLEM 4
[facility]    Medicine Progress Note - Hospitalist Service    Date of Admission:  12/25/2023    Assessment & Plan      Community-acquired pneumonia vs COVID-19 pneumonia: Patient has elevated white count but he is chronically taking prednisone.  His procalcitonin is slightly elevated but not significant.  His chest x-ray showed pneumonia, and some effusion.  Patient was diagnosed with COVID earlier this month.  -Continue with azithromycin, will switch Rocephin to cefepime since recent x-ray noted increased in opacity.  -With his increase in sob and worsening of pneumonia, plus given patient recent COVID earlier this month, concern of worsening inflammation from COVID.  Will check CRP.  If CRP is elevated will place on Decadron.  -Supplemental oxygen as needed  -RT is working with patient  -Sputum culture, urinary strep/Legionella - pending  -Mucinex  -Encourage incentive spirometer and Acapella use.    Anemia, suspect due to chronic GI blood loss:  He does not appear to have acute GI bleed but I suspect he has had some chronic GI blood loss as he has brown guaiac-positive stool. Also has iron deficiency. S/p 2 U pRBC on admit.  -Hold off on IV iron given infection above.  Consider outpatient iron supplement at discharge.  -Will give another unit of blood today to keep hemoglobin above 8 given hypoxia and COVID infection/pneumonia above.  -H&H q8H.  -Hold Eliquis.    -consulted surgery.  If still actively bleeding or rapid drop in hemoglobin, will likely need urgent EGD and colonoscopy, otherwise can probably be done outpatient depending on clinical course.   -Clear liquid diet for now  -PPI bid     COPD/emphysema: Some evidence of exacerbation on 12/27  -Continue his home inhalers with appropriate hospital substitution.  -Duo neb 4 times a day and albuterol neb as needed  -Decadron as above    CAD, PAF, hypertension: Recent cardiology visit in early December has switched from Plavix and aspirin  to Eliquis.  -Continue Coreg, and Imdur with hold parameters  -Hold Eliquis as above  -Will place on IV Lasix twice a day to help with oxygenation with x-ray showing some pleural effusion.    Type 2 diabetes:    -Hold metformin and glipizide  -Continue on sliding scale     Dementia  -Continue on Aricept and Zoloft    Hx prostate cancer  -Continue home Zytiga and hold prednisone while on Decadron above          Diet: Clear Liquid Diet    DVT Prophylaxis: Pneumatic Compression Devices  Collins Catheter: Not present  Lines: None     Cardiac Monitoring: ACTIVE order. Indication: Tachyarrhythmias, acute (48 hours)  Code Status: No CPR- Do NOT Intubate      Clinically Significant Risk Factors              # Hypoalbuminemia: Lowest albumin = 3 g/dL at 12/26/2023  6:17 AM, will monitor as appropriate    # Coagulation Defect: INR = 1.69 (Ref range: 0.85 - 1.15) and/or PTT = N/A, will monitor for bleeding                      Disposition Plan     Expected Discharge Date: 12/27/2023                    Zhou Sutton MD  Hospitalist Service  [facility]  Securely message with [employer] (more info)  Text page via [facility] Paging/Directory   ______________________________________________________________________    Interval History   Patient has no coughing but he feels more shortness of breath today.  He otherwise did not not have any fever or chills.  He does not feel like having much appetite, no nausea or vomiting.    Physical Exam   Vital Signs: Temp: 97.4  F (36.3  C) Temp src: Tympanic BP: 138/67 Pulse: 76   Resp: 18 SpO2: 90 % O2 Device: Nasal cannula Oxygen Delivery: 4 LPM  Weight: 183 lbs 9.6 oz    General Appearance: In nad  Respiratory: Crackly at the bases bilaterally with wheezing.  No rhonchi.  Cardiovascular: RRR, no murmur.  GI: soft, NT/ND, BS+  Skin: No rash.  Neuro: A&O.  Moving his extremity in bed dependently.   Psych: Calm and pleasant    Medical Decision Making       43 MINUTES SPENT BY ME on the  date of service doing chart review, history, exam, documentation & further activities per the note.      Data     I have personally reviewed the following data over the past 24 hrs:    14.4 (H)  \   7.7 (L); 7.7 (L)   / 410     139 103 25.0 (H) /  82   4.3 26 1.23 (H) \     ALT: N/A AST: N/A AP: N/A TBILI: N/A   ALB: N/A TOT PROTEIN: N/A LIPASE: N/A     Procal: 0.33 CRP: 121.08 (H) Lactic Acid: 0.7         Imaging results reviewed over the past 24 hrs:   Recent Results (from the past 24 hour(s))   XR Chest Port 1 View    Narrative    PROCEDURE INFORMATION:   Exam: XR Chest   Exam date and time: 12/27/2023 4:44 AM   Age: 89 years old   Clinical indication: Shortness of breath; Additional info: Worsening SOB     TECHNIQUE:   Imaging protocol: Radiologic exam of the chest.   Views: 1 view.     COMPARISON:   CR XR CHEST PORT 1 VIEW 12/25/2023 7:26 PM     FINDINGS:   Lungs: Interval increase in right perihilar and right basilar   opacity/infiltrate and small right pleural effusion. Some of this interval   increase is due to the change in position and relative expiratory nature of the   radiograph.   Pleural spaces: No pneumothorax   Heart/Mediastinum: Heart size upper limits normal to mildly enlarged, magnified   and overestimated on the AP study.   Bones/joints: No acute abnormality.       Impression    IMPRESSION:   Interval increase in right perihilar and right basilar opacity/infiltrate and   small right pleural effusion.     THIS DOCUMENT HAS BEEN ELECTRONICALLY SIGNED BY DAMI ARANDA MD      Anemia COVID-19

## 2024-02-20 ENCOUNTER — LABORATORY RESULT (OUTPATIENT)
Age: 49
End: 2024-02-20

## 2024-02-20 ENCOUNTER — APPOINTMENT (OUTPATIENT)
Dept: RHEUMATOLOGY | Facility: CLINIC | Age: 49
End: 2024-02-20
Payer: COMMERCIAL

## 2024-02-20 VITALS
DIASTOLIC BLOOD PRESSURE: 88 MMHG | BODY MASS INDEX: 33.62 KG/M2 | OXYGEN SATURATION: 98 % | TEMPERATURE: 98.1 F | SYSTOLIC BLOOD PRESSURE: 138 MMHG | RESPIRATION RATE: 16 BRPM | WEIGHT: 262 LBS | HEART RATE: 99 BPM | HEIGHT: 74 IN

## 2024-02-20 DIAGNOSIS — D89.84 IGG4-RELATED DISEASE: ICD-10-CM

## 2024-02-20 PROCEDURE — G2211 COMPLEX E/M VISIT ADD ON: CPT

## 2024-02-20 PROCEDURE — 99214 OFFICE O/P EST MOD 30 MIN: CPT

## 2024-02-20 RX ORDER — NYSTATIN 100000 [USP'U]/ML
100000 SUSPENSION ORAL 3 TIMES DAILY
Qty: 1 | Refills: 4 | Status: ACTIVE | COMMUNITY
Start: 2022-10-26 | End: 1900-01-01

## 2024-02-20 RX ORDER — FLUTICASONE PROPIONATE AND SALMETEROL 50; 100 UG/1; UG/1
100-50 POWDER RESPIRATORY (INHALATION)
Qty: 60 | Refills: 0 | Status: ACTIVE | COMMUNITY
Start: 2023-12-08

## 2024-02-20 RX ORDER — HALOBETASOL PROPIONATE 0.5 MG/G
0.05 OINTMENT TOPICAL
Qty: 50 | Refills: 0 | Status: ACTIVE | COMMUNITY
Start: 2023-11-13

## 2024-02-21 NOTE — HISTORY OF PRESENT ILLNESS
[___ Month(s) Ago] : [unfilled] month(s) ago [None] : The patient is currently asymptomatic [FreeTextEntry1] : voice is stable  1 episode of SOB - even when getting out of breath about 2 weeks ago - no chest pain - worse with activity - this has resolved. has seen cardiology - cleared. not taking metformin at this time - unsure what A1C is at this time.

## 2024-02-21 NOTE — ASSESSMENT
[FreeTextEntry1] : 1. 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 CHF - under the care of cardioloy 4. S/p covid 19 - resolved! 5. MGUS - normal now - repeat today 6. left shoulder pain - resolved 7. bilateral elbow epicondylitis - start diclofenac 7. thrush - send for nystatin - using inhalers - patient educated on mouth care.   I reviewed previous labs results with patients. Laboratory tests ordered today Diagnosis and Prognosis discussed Continue with current medications medications refilled education provided on thrush prevention F/u  6 months    l

## 2024-02-21 NOTE — PHYSICAL EXAM
[General Appearance - Alert] : alert [General Appearance - In No Acute Distress] : in no acute distress [Neck Appearance] : the appearance of the neck was normal [Neck Cervical Mass (___cm)] : no neck mass was observed [Jugular Venous Distention Increased] : there was no jugular-venous distention [Thyroid Diffuse Enlargement] : the thyroid was not enlarged [Thyroid Nodule] : there were no palpable thyroid nodules [Auscultation Breath Sounds / Voice Sounds] : lungs were clear to auscultation bilaterally [Heart Rate And Rhythm] : heart rate was normal and rhythm regular [Heart Sounds] : normal S1 and S2 [Heart Sounds Gallop] : no gallops [Murmurs] : no murmurs [Heart Sounds Pericardial Friction Rub] : no pericardial rub [Full Pulse] : the pedal pulses are present [Edema] : there was no peripheral edema [Bowel Sounds] : normal bowel sounds [Abdomen Soft] : soft [Abdomen Tenderness] : non-tender [Abdomen Mass (___ Cm)] : no abdominal mass palpated [Cervical Lymph Nodes Enlarged Posterior Bilaterally] : posterior cervical [Cervical Lymph Nodes Enlarged Anterior Bilaterally] : anterior cervical [Supraclavicular Lymph Nodes Enlarged Bilaterally] : supraclavicular [Abnormal Walk] : normal gait [Nail Clubbing] : no clubbing  or cyanosis of the fingernails [Musculoskeletal - Swelling] : no joint swelling seen [Motor Tone] : muscle strength and tone were normal [Skin Color & Pigmentation] : normal skin color and pigmentation [Skin Turgor] : normal skin turgor [] : no rash [Oriented To Time, Place, And Person] : oriented to person, place, and time [Impaired Insight] : insight and judgment were intact [Affect] : the affect was normal [FreeTextEntry1] : all joints with full ROM - no synovitis

## 2024-02-23 DIAGNOSIS — Z91.041 RADIOGRAPHIC DYE ALLERGY STATUS: ICD-10-CM

## 2024-02-23 LAB
ALBUMIN SERPL ELPH-MCNC: 4.5 G/DL
ALP BLD-CCNC: 50 U/L
ALT SERPL-CCNC: 28 U/L
ANION GAP SERPL CALC-SCNC: 10 MMOL/L
APPEARANCE: CLEAR
AST SERPL-CCNC: 23 U/L
BILIRUB SERPL-MCNC: 0.3 MG/DL
BILIRUBIN URINE: NEGATIVE
BLOOD URINE: NEGATIVE
BUN SERPL-MCNC: 14 MG/DL
CALCIUM SERPL-MCNC: 9.9 MG/DL
CHLORIDE SERPL-SCNC: 103 MMOL/L
CO2 SERPL-SCNC: 28 MMOL/L
COLOR: YELLOW
CREAT SERPL-MCNC: 1.09 MG/DL
CRP SERPL-MCNC: 5 MG/L
EGFR: 83 ML/MIN/1.73M2
ERYTHROCYTE [SEDIMENTATION RATE] IN BLOOD BY WESTERGREN METHOD: 7 MM/HR
ESTIMATED AVERAGE GLUCOSE: 220 MG/DL
GLUCOSE QUALITATIVE U: NEGATIVE MG/DL
GLUCOSE SERPL-MCNC: 172 MG/DL
HBA1C MFR BLD HPLC: 9.3 %
HCT VFR BLD CALC: 45.1 %
HGB BLD-MCNC: 14.5 G/DL
KETONES URINE: NEGATIVE MG/DL
LEUKOCYTE ESTERASE URINE: NEGATIVE
MCHC RBC-ENTMCNC: 30.3 PG
MCHC RBC-ENTMCNC: 32.2 GM/DL
MCV RBC AUTO: 94.2 FL
NITRITE URINE: NEGATIVE
PH URINE: 6.5
PLATELET # BLD AUTO: 233 K/UL
POTASSIUM SERPL-SCNC: 4.6 MMOL/L
PROT SERPL-MCNC: 7.3 G/DL
PROTEIN URINE: 30 MG/DL
RBC # BLD: 4.79 M/UL
RBC # FLD: 13.7 %
SODIUM SERPL-SCNC: 141 MMOL/L
SPECIFIC GRAVITY URINE: 1.02
UROBILINOGEN URINE: 0.2 MG/DL
WBC # FLD AUTO: 8.97 K/UL

## 2024-02-28 ENCOUNTER — APPOINTMENT (OUTPATIENT)
Dept: CT IMAGING | Facility: IMAGING CENTER | Age: 49
End: 2024-02-28

## 2024-03-08 ENCOUNTER — APPOINTMENT (OUTPATIENT)
Dept: ENDOCRINOLOGY | Facility: CLINIC | Age: 49
End: 2024-03-08
Payer: COMMERCIAL

## 2024-03-08 VITALS
HEART RATE: 100 BPM | HEIGHT: 74 IN | BODY MASS INDEX: 34.4 KG/M2 | OXYGEN SATURATION: 97 % | TEMPERATURE: 98.4 F | SYSTOLIC BLOOD PRESSURE: 122 MMHG | DIASTOLIC BLOOD PRESSURE: 78 MMHG | WEIGHT: 268.06 LBS

## 2024-03-08 LAB — GLUCOSE BLDC GLUCOMTR-MCNC: 206

## 2024-03-08 PROCEDURE — 95251 CONT GLUC MNTR ANALYSIS I&R: CPT

## 2024-03-08 PROCEDURE — 99214 OFFICE O/P EST MOD 30 MIN: CPT

## 2024-03-08 PROCEDURE — 82962 GLUCOSE BLOOD TEST: CPT

## 2024-03-08 RX ORDER — INSULIN GLARGINE 100 [IU]/ML
100 INJECTION, SOLUTION SUBCUTANEOUS DAILY
Qty: 12 | Refills: 1 | Status: ACTIVE | COMMUNITY
Start: 2024-03-08 | End: 1900-01-01

## 2024-03-08 RX ORDER — SEMAGLUTIDE 0.68 MG/ML
2 INJECTION, SOLUTION SUBCUTANEOUS
Qty: 3 | Refills: 1 | Status: ACTIVE | COMMUNITY
Start: 2023-10-27 | End: 1900-01-01

## 2024-03-08 RX ORDER — INSULIN LISPRO 100 [IU]/ML
(75-25) 100 INJECTION, SUSPENSION SUBCUTANEOUS TWICE DAILY
Qty: 15 | Refills: 3 | Status: DISCONTINUED | COMMUNITY
Start: 2023-03-10 | End: 2024-03-08

## 2024-03-08 RX ORDER — PEN NEEDLE, DIABETIC 32 GX 1/4"
32G X 6 MM NEEDLE, DISPOSABLE MISCELLANEOUS
Qty: 3 | Refills: 1 | Status: ACTIVE | COMMUNITY
Start: 2023-03-10 | End: 1900-01-01

## 2024-03-08 RX ORDER — METFORMIN HYDROCHLORIDE 1000 MG/1
1000 TABLET, COATED ORAL TWICE DAILY
Qty: 180 | Refills: 2 | Status: ACTIVE | COMMUNITY
Start: 2023-02-17 | End: 1900-01-01

## 2024-03-08 RX ORDER — BLOOD SUGAR DIAGNOSTIC
STRIP MISCELLANEOUS
Qty: 270 | Refills: 2 | Status: ACTIVE | COMMUNITY
Start: 2023-02-17 | End: 1900-01-01

## 2024-03-08 RX ORDER — LANCETS 33 GAUGE
EACH MISCELLANEOUS
Qty: 270 | Refills: 2 | Status: ACTIVE | COMMUNITY
Start: 2023-02-17 | End: 1900-01-01

## 2024-03-08 RX ORDER — FLASH GLUCOSE SENSOR
KIT MISCELLANEOUS
Qty: 6 | Refills: 2 | Status: ACTIVE | COMMUNITY
Start: 2023-02-17 | End: 1900-01-01

## 2024-03-08 RX ORDER — GLIPIZIDE 10 MG/1
10 TABLET ORAL DAILY
Qty: 90 | Refills: 2 | Status: ACTIVE | COMMUNITY
Start: 2023-02-17 | End: 1900-01-01

## 2024-03-08 NOTE — HISTORY OF PRESENT ILLNESS
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   Screening  Ophthalmology: follows LDL: on lipitor 40mg po daily EGFR: 80   Current diabetic medication regimen (verified with patient):   metformin 1 g po bid glipizide 10mg po bid  humalog mixed 32 units Q am 28 units pm (most times skips pm dose due to work) ozempic 0.25mg Q weekly (started this week, no GI intolerance reported)   Allergy to Januvia (hives) and allergy to jardiance (hives) reported  Was taken off lantus: he is unsure why    Ambulatory glucose profile (AGP):    Device: Abbott Freestyle Leah 2  Glucose Management Indicator (GMI): 7.6%  Average Bmg/dl   TIR:   TIR >250 mg/dl: 14%  TIR >180mg/dl: 35%  TIR 70 to 180mg/dl: 51%  TIR <70mg/dl: 0%  TIR <54mg/dl: 0 %   Coefficient of variation: 32.2%  Time (%) CGM active: 74   AGP: PP hyperglycemia after lunch and dinner    admit exam wnl admit exam wnl      discharge  exam   baby examined completey from head to toe no defectas noted mo jaundi e

## 2024-03-13 ENCOUNTER — EMERGENCY (EMERGENCY)
Facility: HOSPITAL | Age: 49
LOS: 1 days | Discharge: HOME | End: 2024-03-13
Attending: EMERGENCY MEDICINE | Admitting: EMERGENCY MEDICINE
Payer: COMMERCIAL

## 2024-03-13 VITALS
HEIGHT: 74 IN | HEART RATE: 108 BPM | WEIGHT: 265 LBS | OXYGEN SATURATION: 99 % | RESPIRATION RATE: 20 BRPM | DIASTOLIC BLOOD PRESSURE: 97 MMHG | SYSTOLIC BLOOD PRESSURE: 129 MMHG | TEMPERATURE: 98 F

## 2024-03-13 VITALS
RESPIRATION RATE: 18 BRPM | OXYGEN SATURATION: 96 % | HEART RATE: 107 BPM | DIASTOLIC BLOOD PRESSURE: 95 MMHG | SYSTOLIC BLOOD PRESSURE: 158 MMHG

## 2024-03-13 DIAGNOSIS — Z87.19 PERSONAL HISTORY OF OTHER DISEASES OF THE DIGESTIVE SYSTEM: Chronic | ICD-10-CM

## 2024-03-13 DIAGNOSIS — M31.30 WEGENER'S GRANULOMATOSIS WITHOUT RENAL INVOLVEMENT: Chronic | ICD-10-CM

## 2024-03-13 LAB
ALBUMIN SERPL ELPH-MCNC: 3.5 G/DL — SIGNIFICANT CHANGE UP (ref 3.3–5)
ALP SERPL-CCNC: 36 U/L — SIGNIFICANT CHANGE UP (ref 30–120)
ALT FLD-CCNC: 62 U/L — HIGH (ref 10–60)
ANION GAP SERPL CALC-SCNC: 10 MMOL/L — SIGNIFICANT CHANGE UP (ref 5–17)
APTT BLD: 31.6 SEC — SIGNIFICANT CHANGE UP (ref 24.5–35.6)
AST SERPL-CCNC: 48 U/L — HIGH (ref 10–40)
BASOPHILS # BLD AUTO: 0.02 K/UL — SIGNIFICANT CHANGE UP (ref 0–0.2)
BASOPHILS NFR BLD AUTO: 0.3 % — SIGNIFICANT CHANGE UP (ref 0–2)
BILIRUB SERPL-MCNC: 0.5 MG/DL — SIGNIFICANT CHANGE UP (ref 0.2–1.2)
BUN SERPL-MCNC: 19 MG/DL — SIGNIFICANT CHANGE UP (ref 7–23)
CALCIUM SERPL-MCNC: 9 MG/DL — SIGNIFICANT CHANGE UP (ref 8.4–10.5)
CHLORIDE SERPL-SCNC: 104 MMOL/L — SIGNIFICANT CHANGE UP (ref 96–108)
CO2 SERPL-SCNC: 22 MMOL/L — SIGNIFICANT CHANGE UP (ref 22–31)
CREAT SERPL-MCNC: 1.29 MG/DL — SIGNIFICANT CHANGE UP (ref 0.5–1.3)
D DIMER BLD IA.RAPID-MCNC: <150 NG/ML DDU — SIGNIFICANT CHANGE UP
EGFR: 68 ML/MIN/1.73M2 — SIGNIFICANT CHANGE UP
EOSINOPHIL # BLD AUTO: 0.02 K/UL — SIGNIFICANT CHANGE UP (ref 0–0.5)
EOSINOPHIL NFR BLD AUTO: 0.3 % — SIGNIFICANT CHANGE UP (ref 0–6)
GLUCOSE SERPL-MCNC: 285 MG/DL — HIGH (ref 70–99)
HCT VFR BLD CALC: 40.5 % — SIGNIFICANT CHANGE UP (ref 39–50)
HGB BLD-MCNC: 13.2 G/DL — SIGNIFICANT CHANGE UP (ref 13–17)
IMM GRANULOCYTES NFR BLD AUTO: 0.2 % — SIGNIFICANT CHANGE UP (ref 0–0.9)
INR BLD: 1.02 RATIO — SIGNIFICANT CHANGE UP (ref 0.85–1.18)
LIDOCAIN IGE QN: 42 U/L — SIGNIFICANT CHANGE UP (ref 16–77)
LYMPHOCYTES # BLD AUTO: 1.04 K/UL — SIGNIFICANT CHANGE UP (ref 1–3.3)
LYMPHOCYTES # BLD AUTO: 17.1 % — SIGNIFICANT CHANGE UP (ref 13–44)
MCHC RBC-ENTMCNC: 30.7 PG — SIGNIFICANT CHANGE UP (ref 27–34)
MCHC RBC-ENTMCNC: 32.6 GM/DL — SIGNIFICANT CHANGE UP (ref 32–36)
MCV RBC AUTO: 94.2 FL — SIGNIFICANT CHANGE UP (ref 80–100)
MONOCYTES # BLD AUTO: 0.1 K/UL — SIGNIFICANT CHANGE UP (ref 0–0.9)
MONOCYTES NFR BLD AUTO: 1.6 % — LOW (ref 2–14)
NEUTROPHILS # BLD AUTO: 4.89 K/UL — SIGNIFICANT CHANGE UP (ref 1.8–7.4)
NEUTROPHILS NFR BLD AUTO: 80.5 % — HIGH (ref 43–77)
NRBC # BLD: 0 /100 WBCS — SIGNIFICANT CHANGE UP (ref 0–0)
NT-PROBNP SERPL-SCNC: 388 PG/ML — HIGH (ref 0–125)
PLATELET # BLD AUTO: 205 K/UL — SIGNIFICANT CHANGE UP (ref 150–400)
POTASSIUM SERPL-MCNC: 4.5 MMOL/L — SIGNIFICANT CHANGE UP (ref 3.5–5.3)
POTASSIUM SERPL-SCNC: 4.5 MMOL/L — SIGNIFICANT CHANGE UP (ref 3.5–5.3)
PROT SERPL-MCNC: 7.5 G/DL — SIGNIFICANT CHANGE UP (ref 6–8.3)
PROTHROM AB SERPL-ACNC: 11.4 SEC — SIGNIFICANT CHANGE UP (ref 9.5–13)
RBC # BLD: 4.3 M/UL — SIGNIFICANT CHANGE UP (ref 4.2–5.8)
RBC # FLD: 14 % — SIGNIFICANT CHANGE UP (ref 10.3–14.5)
SODIUM SERPL-SCNC: 136 MMOL/L — SIGNIFICANT CHANGE UP (ref 135–145)
TROPONIN I, HIGH SENSITIVITY RESULT: 18.9 NG/L — SIGNIFICANT CHANGE UP
TROPONIN I, HIGH SENSITIVITY RESULT: 26.1 NG/L — SIGNIFICANT CHANGE UP
WBC # BLD: 6.08 K/UL — SIGNIFICANT CHANGE UP (ref 3.8–10.5)
WBC # FLD AUTO: 6.08 K/UL — SIGNIFICANT CHANGE UP (ref 3.8–10.5)

## 2024-03-13 PROCEDURE — 96374 THER/PROPH/DIAG INJ IV PUSH: CPT

## 2024-03-13 PROCEDURE — 96375 TX/PRO/DX INJ NEW DRUG ADDON: CPT

## 2024-03-13 PROCEDURE — 84484 ASSAY OF TROPONIN QUANT: CPT

## 2024-03-13 PROCEDURE — 99285 EMERGENCY DEPT VISIT HI MDM: CPT

## 2024-03-13 PROCEDURE — 99285 EMERGENCY DEPT VISIT HI MDM: CPT | Mod: 25

## 2024-03-13 PROCEDURE — 85610 PROTHROMBIN TIME: CPT

## 2024-03-13 PROCEDURE — 93010 ELECTROCARDIOGRAM REPORT: CPT

## 2024-03-13 PROCEDURE — 85379 FIBRIN DEGRADATION QUANT: CPT

## 2024-03-13 PROCEDURE — 36415 COLL VENOUS BLD VENIPUNCTURE: CPT

## 2024-03-13 PROCEDURE — 80053 COMPREHEN METABOLIC PANEL: CPT

## 2024-03-13 PROCEDURE — 71275 CT ANGIOGRAPHY CHEST: CPT | Mod: MC

## 2024-03-13 PROCEDURE — 83690 ASSAY OF LIPASE: CPT

## 2024-03-13 PROCEDURE — 71045 X-RAY EXAM CHEST 1 VIEW: CPT

## 2024-03-13 PROCEDURE — 93005 ELECTROCARDIOGRAM TRACING: CPT

## 2024-03-13 PROCEDURE — 85730 THROMBOPLASTIN TIME PARTIAL: CPT

## 2024-03-13 PROCEDURE — 71275 CT ANGIOGRAPHY CHEST: CPT | Mod: 26,MC

## 2024-03-13 PROCEDURE — 83880 ASSAY OF NATRIURETIC PEPTIDE: CPT

## 2024-03-13 PROCEDURE — 85025 COMPLETE CBC W/AUTO DIFF WBC: CPT

## 2024-03-13 PROCEDURE — 71045 X-RAY EXAM CHEST 1 VIEW: CPT | Mod: 26

## 2024-03-13 RX ORDER — DIPHENHYDRAMINE HCL 50 MG
50 CAPSULE ORAL ONCE
Refills: 0 | Status: COMPLETED | OUTPATIENT
Start: 2024-03-13 | End: 2024-03-13

## 2024-03-13 RX ORDER — FUROSEMIDE 40 MG
40 TABLET ORAL ONCE
Refills: 0 | Status: COMPLETED | OUTPATIENT
Start: 2024-03-13 | End: 2024-03-13

## 2024-03-13 RX ORDER — FUROSEMIDE 40 MG
1 TABLET ORAL
Qty: 7 | Refills: 0
Start: 2024-03-13 | End: 2024-03-19

## 2024-03-13 RX ADMIN — Medication 40 MILLIGRAM(S): at 07:16

## 2024-03-13 RX ADMIN — Medication 40 MILLIGRAM(S): at 12:45

## 2024-03-13 RX ADMIN — Medication 50 MILLIGRAM(S): at 10:13

## 2024-03-13 NOTE — CONSULT NOTE ADULT - ASSESSMENT
The patient is a 48 year old male with a history of HTN, HL, DM, chronic systolic heart failure who presents with chest pain and shortness of breath.     Plan:  - ECG with nonspecific findings  - An acute MI is ruled out with two sets of cardiac enzymes  - BNP minimally elevated at 388  - CXR and CTA chest with mild pulm edema  - Epigastric pain - lipase negative. LFTs mildly elevated. Further work-up as per ED.  - Will give furosemide 40 mg IV now and re-assess breathing symptoms and O2 sat. If respiratory symptoms resolve, he can be discharged from a cardiac standpoint and follow-up with his cardiologist as outpatient. If symptoms persist, will need to be admitted for further diuresis.

## 2024-03-13 NOTE — ED PROVIDER NOTE - CARE PROVIDER_API CALL
Bernardo Booth  [facility]  [address] [address] [place] [address] - [place] [address]  Phone: [phone]  Fax: [phone]  Follow Up Time: 1-3 Days

## 2024-03-13 NOTE — ED PROVIDER NOTE - PATIENT PORTAL LINK FT
You can access the FollowMyHealth Patient Portal offered by [facility] by registering at the following website: [email or web address]. By joining [facility]’s FollowMyHealth portal, you will also be able to view your health information using other applications (apps) compatible with our system.

## 2024-03-13 NOTE — ED ADULT NURSE NOTE - NSFALLUNIVINTERV_ED_ALL_ED
Bed/Stretcher in lowest position, wheels locked, appropriate side rails in place/Call bell, personal items and telephone in reach/Instruct patient to call for assistance before getting out of bed/chair/stretcher/Non-slip footwear applied when patient is off stretcher/[place] to call system/Physically safe environment - no spills, clutter or unnecessary equipment/Purposeful proactive rounding/Room/bathroom lighting operational, light cord in reach

## 2024-03-13 NOTE — ED ADULT TRIAGE NOTE - RESPIRATORY RATE (BREATHS/MIN)
[FreeTextEntry1] : 39 -year-old female status post right leg phlebectomy.\par now c/o some pain posterior calf 20

## 2024-03-13 NOTE — ED ADULT NURSE REASSESSMENT NOTE - NS ED NURSE REASSESS COMMENT FT1
remains on cardiac monitor, pt voided 700 cc of urine, no new complaints, will continue to monitor pt

## 2024-03-13 NOTE — CONSULT NOTE ADULT - SUBJECTIVE AND OBJECTIVE BOX
History of Present Illness: The patient is a 48 year old male with a history of HTN, HL, DM, chronic systolic heart failure who presents with chest pain and shortness of breath. Pain is more in the epigastric region and described as a pressure. He felt it when lying down. It is not radiating. He also noted shortness of breath at the time. No leg swelling.    Past Medical/Surgical History:  HTN, HL, DM, chronic systolic heart failure    Medications:  Home Medications:  Aspirin Enteric Coated 81 mg oral delayed release tablet: 1 tab(s) orally once a day (09 Oct 2022 07:58)  atorvastatin 40 mg oral tablet: 1 tab(s) orally once a day (09 Oct 2022 07:58)  carvedilol 25 mg oral tablet: 1 tab(s) orally 2 times a day (09 Oct 2022 07:58)  Entresto 97 mg-103 mg oral tablet: 1 tab(s) orally 2 times a day (09 Oct 2022 07:58)  FAMOTIDINE 20MG TABLETS: TAKE 1 TABLET BY MOUTH AT BEDTIME (09 Oct 2022 07:58)  glipiZIDE 10 mg oral tablet: 1 tab(s) orally once a day (09 Oct 2022 07:58)  HYDRALAZINE  25MG TABLETS(ORANGE): 1 each orally 3 times a day (09 Oct 2022 07:58)  metFORMIN 1000 mg oral tablet: 1 tab(s) orally 2 times a day (09 Oct 2022 07:58)  NovoLOG Mix 70/30 FlexPen subcutaneous suspension: 28 unit(s) subcutaneous in AM and 24 units before dinner (09 Oct 2022 07:58)      Family History: Non-contributory family history of premature cardiovascular atherosclerotic disease    Social History: No tobacco, alcohol or drug use    Review of Systems:  General: No fevers, chills, weight gain  Skin: No rashes, color changes  Cardiovascular: +chest pain, orthopnea  Respiratory: +shortness of breath, cough  Gastrointestinal: No nausea, abdominal pain  Genitourinary: No incontinence, pain with urination  Musculoskeletal: No pain, swelling, decreased range of motion  Neurological: No headache, weakness  Psychiatric: No depression, anxiety  Endocrine: No weight gain, increased thirst  All other systems are comprehensively negative.    Physical Exam:  Vitals:        Vital Signs Last 24 Hrs  T(C): 36.7 (13 Mar 2024 10:33), Max: 36.9 (13 Mar 2024 06:45)  T(F): 98 (13 Mar 2024 10:33), Max: 98.4 (13 Mar 2024 06:45)  HR: 104 (13 Mar 2024 12:45) (96 - 108)  BP: 146/95 (13 Mar 2024 12:45) (129/97 - 146/95)  BP(mean): --  RR: 18 (13 Mar 2024 12:45) (18 - 20)  SpO2: 97% (13 Mar 2024 12:45) (97% - 99%)  Parameters below as of 13 Mar 2024 12:45  Patient On (Oxygen Delivery Method): nasal cannula  O2 Flow (L/min): 2  General: NAD  HEENT: MMM  Neck: No JVD, no carotid bruit  Lungs: CTAB  CV: RRR, nl S1/S2, no M/R/G  Abdomen: S/NT/ND, +BS  Extremities: No LE edema, no cyanosis  Neuro: AAOx3, non-focal  Skin: No rash    Labs:                        13.2   6.08  )-----------( 205      ( 13 Mar 2024 06:59 )             40.5     03-13    136  |  104  |  19  ----------------------------<  285<H>  4.5   |  22  |  1.29    Ca    9.0      13 Mar 2024 06:59    TPro  7.5  /  Alb  3.5  /  TBili  0.5  /  DBili  x   /  AST  48<H>  /  ALT  62<H>  /  AlkPhos  36  03-13        PT/INR - ( 13 Mar 2024 06:59 )   PT: 11.4 sec;   INR: 1.02 ratio         PTT - ( 13 Mar 2024 06:59 )  PTT:31.6 sec    ECG/Telemetry:      History of Present Illness: The patient is a 48 year old male with a history of HTN, HL, DM, chronic systolic heart failure who presents with chest pain and shortness of breath. Pain is more in the epigastric region and described as a pressure. He felt it when lying down. It is not radiating. He also noted shortness of breath at the time. No leg swelling.    Past Medical/Surgical History:  HTN, HL, DM, chronic systolic heart failure    Medications:  Home Medications:  Aspirin Enteric Coated 81 mg oral delayed release tablet: 1 tab(s) orally once a day (09 Oct 2022 07:58)  atorvastatin 40 mg oral tablet: 1 tab(s) orally once a day (09 Oct 2022 07:58)  carvedilol 25 mg oral tablet: 1 tab(s) orally 2 times a day (09 Oct 2022 07:58)  Entresto 97 mg-103 mg oral tablet: 1 tab(s) orally 2 times a day (09 Oct 2022 07:58)  FAMOTIDINE 20MG TABLETS: TAKE 1 TABLET BY MOUTH AT BEDTIME (09 Oct 2022 07:58)  glipiZIDE 10 mg oral tablet: 1 tab(s) orally once a day (09 Oct 2022 07:58)  HYDRALAZINE  25MG TABLETS(ORANGE): 1 each orally 3 times a day (09 Oct 2022 07:58)  metFORMIN 1000 mg oral tablet: 1 tab(s) orally 2 times a day (09 Oct 2022 07:58)  NovoLOG Mix 70/30 FlexPen subcutaneous suspension: 28 unit(s) subcutaneous in AM and 24 units before dinner (09 Oct 2022 07:58)      Family History: Non-contributory family history of premature cardiovascular atherosclerotic disease    Social History: No tobacco, alcohol or drug use    Review of Systems:  General: No fevers, chills, weight gain  Skin: No rashes, color changes  Cardiovascular: +chest pain, orthopnea  Respiratory: +shortness of breath, cough  Gastrointestinal: No nausea, abdominal pain  Genitourinary: No incontinence, pain with urination  Musculoskeletal: No pain, swelling, decreased range of motion  Neurological: No headache, weakness  Psychiatric: No depression, anxiety  Endocrine: No weight gain, increased thirst  All other systems are comprehensively negative.    Physical Exam:  Vitals:        Vital Signs Last 24 Hrs  T(C): 36.7 (13 Mar 2024 10:33), Max: 36.9 (13 Mar 2024 06:45)  T(F): 98 (13 Mar 2024 10:33), Max: 98.4 (13 Mar 2024 06:45)  HR: 104 (13 Mar 2024 12:45) (96 - 108)  BP: 146/95 (13 Mar 2024 12:45) (129/97 - 146/95)  BP(mean): --  RR: 18 (13 Mar 2024 12:45) (18 - 20)  SpO2: 97% (13 Mar 2024 12:45) (97% - 99%)  Parameters below as of 13 Mar 2024 12:45  Patient On (Oxygen Delivery Method): nasal cannula  O2 Flow (L/min): 2  General: NAD  HEENT: MMM  Neck: No JVD, no carotid bruit  Lungs: CTAB  CV: RRR, nl S1/S2, no M/R/G  Abdomen: S/NT/ND, +BS  Extremities: No LE edema, no cyanosis  Neuro: AAOx3, non-focal  Skin: No rash    Labs:                        13.2   6.08  )-----------( 205      ( 13 Mar 2024 06:59 )             40.5     03-13    136  |  104  |  19  ----------------------------<  285<H>  4.5   |  22  |  1.29    Ca    9.0      13 Mar 2024 06:59    TPro  7.5  /  Alb  3.5  /  TBili  0.5  /  DBili  x   /  AST  48<H>  /  ALT  62<H>  /  AlkPhos  36  03-13        PT/INR - ( 13 Mar 2024 06:59 )   PT: 11.4 sec;   INR: 1.02 ratio         PTT - ( 13 Mar 2024 06:59 )  PTT:31.6 sec    ECG/Telemetry: Sinus tachycardia, LAD, nonspecific ST abnormality

## 2024-03-13 NOTE — ED PROVIDER NOTE - OBJECTIVE STATEMENT
48-year-old male history of CAD COVID complicated with PE diabetes GERD hyperlipidemia brought in by EMS complaining of sudden onset of shortness of breath chest pressure that woke him up from sleep this morning states he feels similar to his previous PE.  Currently not on anticoagulation.  Denies any fever chills upper respiratory symptoms.  States pain is nonradiating.

## 2024-03-13 NOTE — ED ADULT TRIAGE NOTE - PATIENT ON (OXYGEN DELIVERY METHOD)
How Severe Is Your Skin Lesion?: mild Has Your Skin Lesion Been Treated?: not been treated Is This A New Presentation, Or A Follow-Up?: Skin Lesion nasal cannula

## 2024-03-13 NOTE — ED PROVIDER NOTE - PROGRESS NOTE DETAILS
Patient feeling significantly improved after diuresis with Lasix.  Discussed with Dr. Andersen (attending cardiologist) he requests discharge with Lasix 20 mg daily. Reevaluated patient at bedside.  Patient feeling much improved.  Discussed the results of all diagnostic testing in ED and copies of all reports given.   An opportunity to ask questions was given.  Discussed the importance of prompt, close medical follow-up.  Patient will return with any changes, concerns or persistent / worsening symptoms.  Understanding of all instructions verbalized.

## 2024-03-13 NOTE — ED PROVIDER NOTE - CLINICAL SUMMARY MEDICAL DECISION MAKING FREE TEXT BOX
48-year-old male history of CAD COVID complicated with PE diabetes GERD hyperlipidemia brought in by EMS complaining of sudden onset of shortness of breath chest pressure that woke him up from sleep this morning states he feels similar to his previous PE.  Currently not on anticoagulation.  Denies any fever chills upper respiratory symptoms.  States pain is nonradiating.    r/o PE, acs, pneumothorax, labs, ekg, XR, CTA chest (will pretreat due to IV contrast allergy)

## 2024-03-13 NOTE — ED ADULT NURSE REASSESSMENT NOTE - NS ED NURSE REASSESS COMMENT FT1
received pt in assigned room a&ox3- states he woke up with chest pressure, associated with sob, pt has h/o P.E., denies any palpitations, dizziness, or cough, pt states he had a ct scheduled today & allergic to IV contrast so prepped with Prednisone 10mg & Benadryl 25mg PO, took at 2am, will give second dosing in ed prior to CT, skin intact resps even and non labored, denies any calf swelling or pain, no recent travel, ivl intact, pt on cardiac monitor, will continue to monitor pt

## 2024-03-13 NOTE — ED ADULT NURSE REASSESSMENT NOTE - NS ED NURSE REASSESS COMMENT FT1
repeat trop sent, remains on cardiac monitor, medicated per MD orders for CT scan, will continue to monitor pt

## 2024-03-13 NOTE — ED ADULT NURSE NOTE - OBJECTIVE STATEMENT
Pt p/w midsternal chest pressure started about 440am, stated he was just laying there, and jumped up and he felt the sx.  stated its not really pain, its pressure in the mid of chest

## 2024-03-19 ENCOUNTER — EMERGENCY (EMERGENCY)
Facility: HOSPITAL | Age: 49
LOS: 1 days | Discharge: HOME | End: 2024-03-19
Attending: EMERGENCY MEDICINE
Payer: COMMERCIAL

## 2024-03-19 ENCOUNTER — NON-APPOINTMENT (OUTPATIENT)
Age: 49
End: 2024-03-19

## 2024-03-19 VITALS
DIASTOLIC BLOOD PRESSURE: 69 MMHG | HEART RATE: 87 BPM | RESPIRATION RATE: 16 BRPM | SYSTOLIC BLOOD PRESSURE: 108 MMHG | OXYGEN SATURATION: 96 %

## 2024-03-19 VITALS
HEIGHT: 74 IN | SYSTOLIC BLOOD PRESSURE: 131 MMHG | OXYGEN SATURATION: 96 % | DIASTOLIC BLOOD PRESSURE: 82 MMHG | WEIGHT: 265 LBS | TEMPERATURE: 97 F | HEART RATE: 89 BPM | RESPIRATION RATE: 18 BRPM

## 2024-03-19 DIAGNOSIS — Z87.19 PERSONAL HISTORY OF OTHER DISEASES OF THE DIGESTIVE SYSTEM: Chronic | ICD-10-CM

## 2024-03-19 DIAGNOSIS — M31.30 WEGENER'S GRANULOMATOSIS WITHOUT RENAL INVOLVEMENT: Chronic | ICD-10-CM

## 2024-03-19 LAB
ALBUMIN SERPL ELPH-MCNC: 4.1 G/DL — SIGNIFICANT CHANGE UP (ref 3.3–5)
ALP SERPL-CCNC: 43 U/L — SIGNIFICANT CHANGE UP (ref 40–120)
ALT FLD-CCNC: 30 U/L — SIGNIFICANT CHANGE UP (ref 10–45)
ANION GAP SERPL CALC-SCNC: 14 MMOL/L — SIGNIFICANT CHANGE UP (ref 5–17)
AST SERPL-CCNC: 20 U/L — SIGNIFICANT CHANGE UP (ref 10–40)
BASOPHILS # BLD AUTO: 0.02 K/UL — SIGNIFICANT CHANGE UP (ref 0–0.2)
BASOPHILS NFR BLD AUTO: 0.2 % — SIGNIFICANT CHANGE UP (ref 0–2)
BILIRUB SERPL-MCNC: 0.3 MG/DL — SIGNIFICANT CHANGE UP (ref 0.2–1.2)
BUN SERPL-MCNC: 29 MG/DL — HIGH (ref 7–23)
CALCIUM SERPL-MCNC: 9.2 MG/DL — SIGNIFICANT CHANGE UP (ref 8.4–10.5)
CHLORIDE SERPL-SCNC: 106 MMOL/L — SIGNIFICANT CHANGE UP (ref 96–108)
CO2 SERPL-SCNC: 19 MMOL/L — LOW (ref 22–31)
CREAT SERPL-MCNC: 1.3 MG/DL — SIGNIFICANT CHANGE UP (ref 0.5–1.3)
EGFR: 67 ML/MIN/1.73M2 — SIGNIFICANT CHANGE UP
EOSINOPHIL # BLD AUTO: 0.06 K/UL — SIGNIFICANT CHANGE UP (ref 0–0.5)
EOSINOPHIL NFR BLD AUTO: 0.6 % — SIGNIFICANT CHANGE UP (ref 0–6)
GLUCOSE SERPL-MCNC: 181 MG/DL — HIGH (ref 70–99)
HCT VFR BLD CALC: 46.9 % — SIGNIFICANT CHANGE UP (ref 39–50)
HGB BLD-MCNC: 15.3 G/DL — SIGNIFICANT CHANGE UP (ref 13–17)
IMM GRANULOCYTES NFR BLD AUTO: 0.2 % — SIGNIFICANT CHANGE UP (ref 0–0.9)
LYMPHOCYTES # BLD AUTO: 39.5 % — SIGNIFICANT CHANGE UP (ref 13–44)
LYMPHOCYTES # BLD AUTO: 4.17 K/UL — HIGH (ref 1–3.3)
MCHC RBC-ENTMCNC: 30.2 PG — SIGNIFICANT CHANGE UP (ref 27–34)
MCHC RBC-ENTMCNC: 32.6 GM/DL — SIGNIFICANT CHANGE UP (ref 32–36)
MCV RBC AUTO: 92.7 FL — SIGNIFICANT CHANGE UP (ref 80–100)
MONOCYTES # BLD AUTO: 0.76 K/UL — SIGNIFICANT CHANGE UP (ref 0–0.9)
MONOCYTES NFR BLD AUTO: 7.2 % — SIGNIFICANT CHANGE UP (ref 2–14)
NEUTROPHILS # BLD AUTO: 5.54 K/UL — SIGNIFICANT CHANGE UP (ref 1.8–7.4)
NEUTROPHILS NFR BLD AUTO: 52.3 % — SIGNIFICANT CHANGE UP (ref 43–77)
NRBC # BLD: 0 /100 WBCS — SIGNIFICANT CHANGE UP (ref 0–0)
PLATELET # BLD AUTO: 260 K/UL — SIGNIFICANT CHANGE UP (ref 150–400)
POTASSIUM SERPL-MCNC: 4.3 MMOL/L — SIGNIFICANT CHANGE UP (ref 3.5–5.3)
POTASSIUM SERPL-SCNC: 4.3 MMOL/L — SIGNIFICANT CHANGE UP (ref 3.5–5.3)
PROT SERPL-MCNC: 7.4 G/DL — SIGNIFICANT CHANGE UP (ref 6–8.3)
RBC # BLD: 5.06 M/UL — SIGNIFICANT CHANGE UP (ref 4.2–5.8)
RBC # FLD: 13.6 % — SIGNIFICANT CHANGE UP (ref 10.3–14.5)
SODIUM SERPL-SCNC: 139 MMOL/L — SIGNIFICANT CHANGE UP (ref 135–145)
WBC # BLD: 10.57 K/UL — HIGH (ref 3.8–10.5)
WBC # FLD AUTO: 10.57 K/UL — HIGH (ref 3.8–10.5)

## 2024-03-19 PROCEDURE — 85025 COMPLETE CBC W/AUTO DIFF WBC: CPT

## 2024-03-19 PROCEDURE — 80053 COMPREHEN METABOLIC PANEL: CPT

## 2024-03-19 PROCEDURE — 99284 EMERGENCY DEPT VISIT MOD MDM: CPT

## 2024-03-19 NOTE — ED PROVIDER NOTE - RAPID ASSESSMENT
48 year old male with a history of HTN, HL, DM, chronic systolic heart failure, Wegeners, presents to ED referred by rheumatologist ANDRES Newman due to findings of supraglottitis on outpatient CT.  Rheumatology has known in HIE case was discussed with ENT and patient was referred to ED for evaluation.  Patient reports a chronic cough and intermittent shortness of breath.  No fevers or chills.  No difficulty swallowing.    Patient was seen as a QPA patient. The patient will be seen and further worked up in the main emergency department and their care will be completed by the main emergency department team along with a thorough physical exam. Receiving team will follow up on labs, analgesia, any clinical imaging, reassess and disposition as clinically indicated, all decisions regarding the progression of care will be made at their discretion. - Yancy BOO 48 year old male with a history of HTN, HL, DM, chronic systolic heart failure, Wegeners, presents to ED referred by rheumatologist ANDRES Newman due to findings of supraglottitis on outpatient CT.  Rheumatology has known in HIE case was discussed with ENT and patient was referred to ED for evaluation.  Patient reports a chronic cough and intermittent shortness of breath.  No fevers or chills.  No difficulty swallowing.    Patient was seen as a QPA patient. The patient will be seen and further worked up in the main emergency department and their care will be completed by the main emergency department team along with a thorough physical exam. Receiving team will follow up on labs, analgesia, any clinical imaging, reassess and disposition as clinically indicated, all decisions regarding the progression of care will be made at their discretion. - Yancy Blount MD: This patient was seen and orders were placed by the PA as per our department's QPA model.  I was not consulted in regards to this patient although I was present and available in the Emergency Department to the PA.  Patient was to be sent to main ED for full medical evaluation and receiving team was to follow up on any labs, analgesia, clinical imaging ordered by the PA.  Any reassessment and disposition decisions were to be made by receiving team as clinically indicated, all decisions regarding the progression of care to be made at their discretion.  I did not perform a comprehensive history and physical on this patient unless stated otherwise in this note.

## 2024-03-19 NOTE — CONSULT NOTE ADULT - ENMT COMMENTS
Flexible Fiberoptic Laryngoscopy: clear nasopharynx to glottis, epiglottis sharp, tvc mobile b/l, airway patent

## 2024-03-19 NOTE — ED PROVIDER NOTE - ATTENDING CONTRIBUTION TO CARE
Patient seen with PA and Resident  here for abnormal ct of glottic region with signs of inflammation  will consult ENT for scope    labs ordered and independently reviewed by me    ENT recommendations appreciated, Dr. Mathews and I discussed the case in the emergency department and patient appears well Patient seen with PA and Resident  here for abnormal ct of glottic region with signs of inflammation  will consult ENT for scope    labs ordered and independently reviewed by me    ENT recommendations appreciated, Dr. Mathews and I discussed the case in the emergency department and patient appears well and can follow up with [facility] Onofre    The patient was serially evaluated throughout emergency department course by the team. There was no acute deterioration up to this time in the emergency department. The patient has demonstrated clinical improvement and/or stability, feels better at this time according to emergency department team. Agree with goals/plan of emergency department care as described in this physician's electronic medical record, including diagnostics, therapeutics and consultation recommendation as clinically warranted. Will discharge home with close outpatient follow up with primary care physician/provider and specialist if necessary. The patient and/or family was educated on expectant management and return precautions concerning signs and features to return to the emergency department, in layman terms, including but not limited to: nausea, vomiting, fever, chills, the inability to eat, take medications, or drink, persistent/worsening symptoms or any concerns at all. There are no acute or immediate life threatening issues present on history, clinical exam, or any diagnostic evaluation. The patient is a safe disposition home, has capacity and insight into their condition, is ambulatory in the Emergency Department with no further questions and will follow up with their doctor(s) this week. Diagnosis, prognosis, natural history and treatment was discussed with patient and/or family. The patient and/or family were given the opportunity to ask questions and have them answered in full. The patient and/or family are with capacity and insight into the situation, treatment, risks, benefits, alternative therapies, and understand that they can ask any further questions if needed. Patient and/or family/guardian understands anticipatory guidance and was given strict return and follow up precautions. The patient and/or family/guardian has been informed of the necessity to follow up with the PMD/Clinic/follow up as provided within 2-3 days, and the patient and/or family/guardian reports understanding of above with capacity and insight. The patient and/or family/guardian were informed of any results of their tests and are were encouraged to follow up on the findings with their doctor as well as the need to inform their doctor of any results. The patient and/or family/guardian are aware of the need to follow up with repeat testing as applicable and report understanding of the above with capacity and insight. The patient and/or family/guardian was made aware of any pending test results at the time of discharge and of the need to call back for the final results as well as the need to inform their doctor of the results.

## 2024-03-19 NOTE — CONSULT NOTE ADULT - NOSE
nasal/sinus endoscopy: maxillary sinus with mucoid fluid b/l via inferior meatus, ss clear b/l,/clear discharge/inflamed mucosa/congestion

## 2024-03-19 NOTE — CONSULT NOTE ADULT - COMMENTS
Vital Signs Last 24 Hrs  T(C): 36.9 (19 Mar 2024 17:35), Max: 36.9 (19 Mar 2024 17:35)  T(F): 98.4 (19 Mar 2024 17:35), Max: 98.4 (19 Mar 2024 17:35)  HR: 73 (19 Mar 2024 17:35) (73 - 89)  BP: 110/74 (19 Mar 2024 17:35) (110/74 - 131/82)  BP(mean): 83 (19 Mar 2024 17:35) (83 - 83)  RR: 18 (19 Mar 2024 17:35) (18 - 18)  SpO2: 99% (19 Mar 2024 17:35) (96% - 99%)    Parameters below as of 19 Mar 2024 17:35  Patient On (Oxygen Delivery Method): room air

## 2024-03-19 NOTE — ED PROVIDER NOTE - NSCAREINITIATED _GEN_ER
[facility]      Patient Name: Viv Padilla  MRN:[id number]  :1983    Referring Physician:   Hailee Blanco MD  Fax: [phone]    Chief Complaint   Patient presents with   • Follow-up     4 months.   • Office Visit       SUBJECTIVE:     HISTORY OF PRESENT ILLNESS:  Viv Padilla is a 40 year old male patient in the office for a follow up.     He was admitted to [facility] with acute myocardial infarction (STEMI) in 2023.  He started having chest pain at workplace.  Angiogram was performed which showed 100% posterolateral branch occlusion and underwent COLLEEN placement successfully.  He also had a residual serial lesions in the LAD and was taken back for staged PCI and had LAD stents x2.    During the further work-up patient also found to have a significant elevated LDL more than 200 suggestive of family hyperlipidemia.    Patient was started on dual antiplatelet therapy along with Repatha and atorvastatin for aggressive cholesterol control.      He is here for follow-up appointment.  He currently feels well and denies any symptoms in form of chest pain or shortness of breath.  He has joined cardiac rehab and able to perform exercise and now able to jog without any limitation.    He has some left knee pain however likely musculoskeletal.  Occasionally feels left-sided chest pain next day after picking up heavy grocery bags which is likely due to musculoskeletal.    He had recent lipid clinic appointment and had a LDL performed which was 12.  He also genetic testing done which was negative.  He is tolerating the medications well otherwise.      He denies any bleeding complication or any side effect from the medications.    He denies any leg pain, leg swelling, orthopnea, PND, fall or loss of consciousness.      PAST MEDICAL HISTORY:  Past Medical History:   Diagnosis Date   • Familial hypercholesteremia 2023   • ST elevation (STEMI) myocardial infarction involving  other coronary artery of inferior wall (CMD) 6/26/2023       MEDICATIONS:  Current Outpatient Medications   Medication Sig   • atorvastatin (LIPITOR) 40 MG tablet Take 1 tablet by mouth daily.   • aspirin 81 MG chewable tablet Chew 1 tablet by mouth daily.   • prasugrel (EFFIENT) 10 MG Tab Take 1 tablet by mouth daily.   • acetaminophen (TYLENOL) 500 MG tablet Take 500 mg by mouth every 4 hours as needed for Pain.   • EVOLocumab (REPATHA SURECLICK) 140 MG/ML injection Inject 1 mL into the skin every 14 days. Indications: Disease involving Lipid Deposits in the Arteries, Heart Attack, High Amount of Fats in the Blood, Inherited Heterozygous Hypercholesterolemia, Procedure to Reestablish Blood Supply to the Heart   • metoPROLOL succinate (TOPROL-XL) 100 MG 24 hr tablet Take 1 tablet by mouth daily.     No current facility-administered medications for this visit.       ALLERGIES:  ALLERGIES:  No Known Allergies    PAST SURGICAL HISTORY:  History reviewed. No pertinent surgical history.    FAMILY HISTORY:  History reviewed. No pertinent family history.    SOCIAL HISTORY:  Social History     Tobacco Use   • Smoking status: Never   • Smokeless tobacco: Never   Vaping Use   • Vaping Use: never used   Substance Use Topics   • Alcohol use: Never   • Drug use: Never       Review of Systems   Constitutional: Negative.   HENT: Negative.    Eyes: Negative.    Cardiovascular:        AS PER HPI   Respiratory:        AS PER HPI   Endocrine: Negative.    Hematologic/Lymphatic: Negative.    Skin: Negative.    Musculoskeletal: Negative.    Gastrointestinal: Negative.    Neurological: Negative.    Psychiatric/Behavioral: Negative.    Allergic/Immunologic: Negative.        OBJECTIVE:     Vitals:    11/06/23 0835 11/06/23 0838   BP: 110/80 110/80   BP Location: LUE - Left upper extremity LUE - Left upper extremity   Patient Position: Sitting Standing   Cuff Size: Regular Regular   Pulse: 68    Temp: 96.2 °F (35.7 °C)    TempSrc: Temporal     SpO2: 100%    Weight: 79.6 kg (175 lb 9.5 oz)    Height: 5' 7\" (1.702 m)        Physical Exam  Constitutional:       General: He is not in acute distress.     Appearance: He is well-developed. He is not diaphoretic.   HENT:      Head: Atraumatic.      Nose: Nose normal.      Mouth/Throat:      Mouth: Mucous membranes are moist.      Neck: Neck supple.   Eyes:      General: No scleral icterus.     Conjunctiva/sclera: Conjunctivae normal.   Neck:      Vascular: No carotid bruit or JVD.   Cardiovascular:      Rate and Rhythm: Normal rate and regular rhythm.      Pulses: Normal pulses.      Heart sounds: S1 normal and S2 normal. No murmur heard.     No friction rub. No gallop.   Pulmonary:      Effort: Pulmonary effort is normal.      Breath sounds: Normal breath sounds.   Abdominal:      General: Bowel sounds are normal.      Palpations: Abdomen is soft.      Tenderness: There is no abdominal tenderness.   Musculoskeletal:         General: No tenderness.   Skin:     General: Skin is warm and moist.   Neurological:      Mental Status: He is alert and oriented to person, place, and time.      Motor: No weakness.      Deep Tendon Reflexes: Reflexes are normal and symmetric.   Psychiatric:         Attention and Perception: Attention normal.         Speech: Speech normal.         Behavior: Behavior normal.         Cognition and Memory: Cognition normal.         DIAGNOSTIC STUDIES:     Labs:  CBC:   Recent Labs   Lab 06/28/23  0448 06/27/23  0441 06/26/23  1035   WBC 10.7 11.2* 11.0   RBC 4.76 4.96 5.02   HGB 13.5 13.9 14.2   HCT 39.6 41.8 41.8   MCV 83.2 84.3 83.3   MCHC 34.1 33.3 34.0   RDW-CV 13.7 13.8 13.5    270 314   Lymphocytes, Percent 23 24 33       CMP:  Recent Labs   Lab 08/10/23  1120 06/28/23  0448 06/27/23  0441 06/26/23  1035   Sodium 139 140 140 137   Potassium 4.3 3.8 4.0 4.0   Chloride 108 112* 112* 109   Carbon Dioxide 29 24 25 25   BUN 10 12 11 18   Creatinine 0.74 0.79 0.71 0.76   Glucose 98 98  98 131*   Albumin 4.0  --  3.4* 4.0   Phosphorus  --  4.0  --   --    GOT/AST 29  --  86* 49*   GPT 75*  --  49 49   Alkaline Phosphatase 103  --  67 74   Bilirubin, Total 0.8  --  0.8 0.5   Magnesium  --  1.9 2.2  --        COAGULATION STUDIES:   Recent Labs   Lab 06/26/23  1035   Protime- PT 10.7   PTT 21*   INR 1.0       TSH:  No results found for: \"TSH\"  HbA1c:   Hemoglobin A1C (%)   Date Value   06/26/2023 5.2     LIPID PANEL:   Recent Labs   Lab 08/10/23  1120 06/26/23  1035   Cholesterol 77 242*   Triglycerides 65 117   HDL 52 43   CALCLDL 12 176*       NT-PRONBP: No results for input(s): \"NTPROB\" in the last 8765 hours.    Imaging:  Results for orders placed or performed during the hospital encounter of 06/26/23   Electrocardiogram 12-Lead   Result Value Ref Range    Ventricular Rate EKG/Min (BPM) 66     Atrial Rate (BPM) 66     VT-Interval (MSEC) 159     QRS-Interval (MSEC) 82     QT-Interval (MSEC) 385     QTc 404     P Axis (Degrees) 67     R Axis (Degrees) 20     T Axis (Degrees) -14     REPORT TEXT       Sinus rhythm  Confirmed by NO QUINTANA DO ([id number]) on 6/29/2023 1:29:28 AM       Cardiac cath: 06/2023  Dominance: Right  LMCA: Angiographically normal  LAD: Large caliber vessel with mid 95% complex diffuse stenosis involving septal 1 and diagonal 1.  The distal LAD after diagonal 2 was unable to be visualized via antegrade angiography.  Distal LAD fills via well-developed collateral from RPDA  LCx: Large caliber vessel with no angiographic CAD.  Gives off 2 OM branches that are angiographically normal  RCA: Large-caliber vessel with moderate ectasia in the proximal and mid segments.  RPLA: Medium caliber vessel with proximal 100% acute thrombotic occlusion which is a culprit lesion. S.p pTCA and COLLEEN x1, 2.25 x13 mm [facility]   RPDA: Medium caliber vessel with proximal haziness of undetermined significance     LVEDP was 18 mmHg. There was no significant LV-Ao gradient on catheter pullback.      Cardiac cath: stage intervention  Procedures:  - Ultrasound guided vascular access  - OCT of LAD  - PCI with COLLEEN x2 to mid LAD - right radial approach  - Arteriotomy closure: Vasc band  - Supervised moderate conscious sedation     Intervention(s):  - OCT guided PCI of mid LAD using overlapping Xience Skypoint COLLEEN 3.0 x 28 mm and 2.5 x 33 mm.  Pre and post PCI YOLY-3 flow.  0% residual stenosis     Findings:     Dominance: Right  LMCA: Angiographically normal  LAD: Large caliber vessel with mid 95% complex diffuse stenosis.  Septal 1 has ostial 80% stenosis     OCT of LAD:  -Pre-PCI: MLA 2.1 mm², MLD 1.6 mm.  Proximal reference MLA 9.8 mm², MLD 3.5 mm.  Distal reference MLA 3.1 mm², MLD 2.0 mm  -Post PCI: proximal MLA 9.3 mm², MLD 3.4 mm.  Distal MLA 4.4 mm², MLD 2.4 mm    Echo: 06/2023  1. Left ventricle: The cavity size is normal. Wall thickness is normal.     Systolic function is normal. The ejection fraction was measured by visual     estimation. Left ventricular diastolic function parameters are normal. The     ejection fraction is 65%.  2. Mitral valve: There is no evidence for stenosis. There is no significant     regurgitation.  3. Right ventricle: The cavity size is normal. Wall thickness is normal.     Systolic function is normal. Systolic pressure is within the normal range.     The estimated peak pressure is 30mm Hg.  4. Tricuspid valve: There is mild regurgitation.  5. Pericardium, extracardiac: There is no pericardial effusion.  6. Regional wall motion: There is hypokinesis of the basal inferoseptal and     basal inferior walls.    Exercise stress test 07/12/2023:  IMPRESSION:  1. There were no symptoms consistent with angina pectoris.  2. There were no electrocardiographic changes diagnostic of myocardial  ischemia  3. There was a normal blood pressure response to exercise.  4.  No arrhythmia were noted.  5. The patient was functional class 1.    ASSESSMENT AND PLAN:     IMPRESSION AND PLAN:  1.  Coronary artery disease involving native coronary artery of native heart without angina pectoris    2. Familial hypercholesteremia    3. Status post insertion of drug eluting coronary artery stent    4. History of myocardial infarction of inferior wall, greater than 8 weeks      Plan:  -Patient is here for cardiovascular follow-up visit.  -Unfortunately patient had acute MI at young age with multivessel coronary disease.  -Patient also found to have a significant elevated LDLs indicating family hyperlipidemia.  -At this time he is doing well and will continue aggressive medical management.  -His LDL is significantly improved and recent LDL in August 2023 was 12.  Tolerating Repatha well.  We will decrease the dose of Lipitor to 40 mg daily.  LDL is excellently controlled and at goal.  -Tolerating aspirin and prasugrel 10 mg daily well we will continue the same for 1 year from the MI.  --As per the patient is genetic testing done which was negative.  Recommend continue monitoring.  --Recommend salt restriction of 2 g/day.  -Recommend daily weight check and call the office for weight gains of 2-3 pounds in 24-28 hours.  - Limit the amount of sugar sweetened drinks like soda pop and juice..  - Recommend to increase physical activity with goal 30-60 minutes of physical activity 4-5 days a week.  -Recommend to continue Toprol- mg daily for recent myocardial infarction.  -Echocardiogram in the hospital showed normal LV function.      Orders Placed This Encounter   • atorvastatin (LIPITOR) 40 MG tablet        Return in about 6 months (around 5/6/2024).       Leroy Solomon MD  Cardiology    Portions of this  note may have been created using the Dragon voice recognition system.  Errors in content may be related to improper recognition of the system.  Effort to review and correct the note has been made but irregularities may still be present.  Medication reconciliation was done but medications not prescribed by me  may be inaccurate.    On 11/06/23, Violeta TAYLOR scribed the services personally performed by Leroy Solomon MD. The documentation recorded by the scribe accurately and completely reflects the service(s) I personally performed and the decisions made by me.     Марина Haines)

## 2024-03-19 NOTE — ED PROVIDER NOTE - HIV OFFER
Previously Declined (within the last year) Obtain Level of Care/Service Not Available at this Facility/Worsening of Condition, Death, or Disability if Patient Does Not Transfer

## 2024-03-19 NOTE — ED ADULT NURSE NOTE - OBJECTIVE STATEMENT
48y m pt sent into ed for eval s/p incidental finding of infection in throat; pt was dx with marek last year and went for a fu scan this year; pt denies any symptoms; no cough/congestion; no diff swallow; no diff breath; no fever/chills; aox3; no sob; no resp distress; no chest pain; no abd pain; no sore throat; iv already in place; pt states has no complaints at this time; vss

## 2024-03-19 NOTE — ED ADULT NURSE REASSESSMENT NOTE - NS ED NURSE REASSESS COMMENT FT1
Report received from PAZ Carvajal. Pt A&Ox4, breathing spontaneously and unlabored with even respirations, moving all extremities easily, pt showing no signs of acute distress.

## 2024-03-19 NOTE — CONSULT NOTE ADULT - SUBJECTIVE AND OBJECTIVE BOX
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 The patient had a CT scan was ordered as a routine follow-up for his prior laryngeal mass.  CT scan performed yesterday 3/18 and patient received a call today to come to the emergency department for abnormal CT  finding.  Per chart documentation rheumatology spoke to ENT Dr. Onofre suggested patient be evaluated in the emergency department this evening.  Patient reports that he was seen in the ED a few days ago for shortness of breath and was DC home after diuresis and a PE study negative. Patient reports that he has had mild dry cough. Patient with some facial/sinus headaches and postnasal drip.  Denies fevers, sore throat, difficulty breathing, difficulty swallowing, muffled voice, chest pain, abdominal pain, n/v    HIV:    HIV Test Questions:  · In accordance with [place] State law, we offer every patient who comes to our ED an HIV test. Would you like to be tested today?	Previously Declined (within the last year)    PAST MEDICAL/SURGICAL/FAMILY/SOCIAL HISTORY:    Tobacco Usage:  · Tobacco Usage	Unknown if ever smoked    ALLERGIES AND HOME MEDICATIONS:   Allergies:        Allergies:  	IV Contrast: Drug, Hives  	Januvia: Drug, Hives    Home Medications:   * Patient Currently Takes Medications as of 13-Mar-2024 15:47 documented in Structured Notes  · 	Lasix 20 mg oral tablet: 1 tab(s) orally once a day  · 	Xarelto Starter Pack 15 mg-20 mg oral kit: TAKE AS DIRECTED  	** VERIFY COST, PRICE CHECK  	** Pager provider [phone]  · 	Entresto 97 mg-103 mg oral tablet: 1 tab(s) orally 2 times a day  · 	NovoLOG Mix 70/30 FlexPen subcutaneous suspension: 28 unit(s) subcutaneous in AM and 24 units before dinner  · 	atorvastatin 40 mg oral tablet: 1 tab(s) orally once a day  · 	carvedilol 25 mg oral tablet: 1 tab(s) orally 2 times a day  · 	metFORMIN 1000 mg oral tablet: 1 tab(s) orally 2 times a day  · 	glipiZIDE 10 mg oral tablet: 1 tab(s) orally once a day  · 	Aspirin Enteric Coated 81 mg oral delayed release tablet: 1 tab(s) orally once a day  · 	FAMOTIDINE 20MG TABLETS: TAKE 1 TABLET BY MOUTH AT BEDTIME  · 	HYDRALAZINE  25MG TABLETS(ORANGE): 1 each orally 3 times a day    LABS:  CBC Full  -  ( 19 Mar 2024 16:35 )  WBC Count : 10.57 K/uL  Hemoglobin : 15.3 g/dL  Hematocrit : 46.9 %  Platelet Count - Automated : 260 K/uL  Mean Cell Volume : 92.7 fl  Mean Cell Hemoglobin : 30.2 pg  Mean Cell Hemoglobin Concentration : 32.6 gm/dL  Auto Neutrophil # : 5.54 K/uL  Auto Lymphocyte # : 4.17 K/uL  Auto Monocyte # : 0.76 K/uL  Auto Eosinophil # : 0.06 K/uL  Auto Basophil # : 0.02 K/uL  Auto Neutrophil % : 52.3 %  Auto Lymphocyte % : 39.5 %  Auto Monocyte % : 7.2 %  Auto Eosinophil % : 0.6 %  Auto Basophil % : 0.2 %

## 2024-03-19 NOTE — ED PROVIDER NOTE - PATIENT PORTAL LINK FT
You can access the FollowMyHealth Patient Portal offered by [facility] by registering at the following website: [email or web address]. By joining [employer]’s FollowMyHealth portal, you will also be able to view your health information using other applications (apps) compatible with our system.

## 2024-03-19 NOTE — ED PROVIDER NOTE - NS ED ATTENDING STATEMENT MOD
I have seen and examined this patient and fully participated in the care of this patient as the teaching attending.  The service was shared with the NADIR.  I reviewed and verified the documentation.

## 2024-03-19 NOTE — ED PROVIDER NOTE - NSFOLLOWUPINSTRUCTIONS_ED_ALL_ED_FT
Today you were evaluated for a Laryngeal mass.     Please follow up with Dr. Onofre within the next week for further management.   Please return to the ED if you are having difficulty breathing or swallowing.     You may take 650 mg acetaminophen every and/or 600 mg of Motrin every six hours as needed for pain.   Drink plenty of fluids and rest.

## 2024-03-19 NOTE — ED ADULT NURSE NOTE - NSFALLUNIVINTERV_ED_ALL_ED
Bed/Stretcher in lowest position, wheels locked, appropriate side rails in place/Call bell, personal items and telephone in reach/Instruct patient to call for assistance before getting out of bed/chair/stretcher/Non-slip footwear applied when patient is off stretcher/[place] to call system/Physically safe environment - no spills, clutter or unnecessary equipment/Purposeful proactive rounding/Room/bathroom lighting operational, light cord in reach

## 2024-03-19 NOTE — ED PROVIDER NOTE - CLINICAL SUMMARY MEDICAL DECISION MAKING FREE TEXT BOX
48 year old male with known prior inflammatory laryngeal mass s/p surgical removal presents to ED at the request of his Rheumatologist after routine follow up CT revealed performed yesterday showed Supraglottitis. Pt reports that he has had slight dry cough but otherwise has been feeling well, he denies fevers, sore throat, difficulty breathing, difficulty swallowing, muffled voice.  Per chart review patient's rheumatologist Dr. Newman spoke to ENT, Dr. Onofre who recommended patient come to the emergency department for evaluation.  On exam patient vital signs are stable, patient speaking in clear full sentences with mild erythema of the throat without significant tonsilar swelling or findings of vesicles or exudates. Studies including CBC/CMP ordered by [facility] prior to eval in main ED. Plan to consult ENT for bedside scope and recommendations for management of possible Supraglottitis and reassess for dispo after ENT eval   Laura Burns PA-C

## 2024-03-19 NOTE — ED ADULT NURSE NOTE - PAIN RATING/NUMBER SCALE (0-10): ACTIVITY
Detail Level: Detailed Depth Of Biopsy: dermis Was A Bandage Applied: Yes Size Of Lesion In Cm: 0 Biopsy Type: H and E Biopsy Method: Dermablade Anesthesia Type: 1% lidocaine without epinephrine Anesthesia Volume In Cc (Will Not Render If 0): 0.5 Hemostasis: Drysol Wound Care: Petrolatum Dressing: bandage Destruction After The Procedure: No Type Of Destruction Used: Curettage 0 (no pain/absence of nonverbal indicators of pain) Curettage Text: The wound bed was treated with curettage after the biopsy was performed. Cryotherapy Text: The wound bed was treated with cryotherapy after the biopsy was performed. Electrodesiccation Text: The wound bed was treated with electrodesiccation after the biopsy was performed. Electrodesiccation And Curettage Text: The wound bed was treated with electrodesiccation and curettage after the biopsy was performed. Silver Nitrate Text: The wound bed was treated with silver nitrate after the biopsy was performed. Lab: -[id number] Consent: Written consent was obtained and risks were reviewed including but not limited to scarring, infection, bleeding, scabbing, incomplete removal, nerve damage and allergy to anesthesia. Post-Care Instructions: I reviewed with the patient in detail post-care instructions. Patient is to keep the biopsy site dry overnight, and then apply bacitracin twice daily until healed. Patient may apply hydrogen peroxide soaks to remove any crusting. Notification Instructions: Patient will be notified of biopsy results. However, patient instructed to call the office if not contacted within 2 weeks. Billing Type: Third-Party Bill Information: Selecting Yes will display possible errors in your note based on the variables you have selected. This validation is only offered as a suggestion for you. PLEASE NOTE THAT THE VALIDATION TEXT WILL BE REMOVED WHEN YOU FINALIZE YOUR NOTE. IF YOU WANT TO FAX A PRELIMINARY NOTE YOU WILL NEED TO TOGGLE THIS TO 'NO' IF YOU DO NOT WANT IT IN YOUR FAXED NOTE. Billing Type: Third-Party Bill

## 2024-03-19 NOTE — CONSULT NOTE ADULT - ASSESSMENT
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  Exam consistent with GERD. Epiglottis WNL. Airway widely patent    Plan:  1) diet as tolerated  2) f/u with ENT as per previously scheduled appt.  3) no acute ENT intervention

## 2024-03-19 NOTE — ED ADULT NURSE NOTE - NS ED NOTE ABUSE RESPONSE YN
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 in October 2015 with pancytopenia.  patient was treated with Rituxan initially.         In May 2020, patient presented with a left chest wall lump.  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  Patient was treated with 4 cycles of R-EPOCH.  Patient was admitted to [facility] on 10/13/20 for autologous stem cell transplant.  Today is day 0.  Patient denies any complaints.  Patient received his stem cell infusion today.  He had an episode of flushing, desaturation to 88% towards the end of the infusion, and reports it lasted about 10 minutes.  He was briefly on 2L NC with O2 saturation returning to 98%, but is currently on RA and feels fine.    Type of transplant: autologous  Conditioning regimen: BEAM  Time to neutrophil engraftment: pending  GVHD: no  high dose steroids:no  CMV negative  HSV positive  Hepatitis B: negative  toxoplasmosis: Negative    PAST MEDICAL HISTORY    Past Medical History:   Diagnosis Date   • Diffuse large B cell lymphoma (CMS/HCC) 10/2015   • Eczema    • Essential (primary) hypertension    • Glaucoma    • High cholesterol    • History of blood transfusion    • Squamous cell carcinoma of arm 2019       PAST SURGICAL HISTORY    Past Surgical History:   Procedure Laterality Date   • Drainage of hydrocele,tunica     • Glaucoma surgery Left 02/2020    laser treatments    • Kidney surgery      removal oncocytoma   • Laparoscopic cholecystectomy  07/04/2020    Laparoscopic cholecystectomy, lysis of adhesions, repair of umbilical hernia  -- Dr. Dalton Hale   • Port indwelling implantable     • Soft tissue biopsy  05/18/2020    of chest wall mass   • Tonsillectomy     • Tumor removal Right     Kidney Oncocytoma (benign tumor) removal       MEDICATIONS  Current Facility-Administered Medications   Medication Dose Route Frequency Provider Last Rate Last Dose   • guaiFENesin-DM) (ROBITUSSIN DM) 100-10 MG/5ML syrup 10 mL  10 mL Oral Q4H PRN Kenia Young CNP       • sodium chloride 0.9% infusion  1,000 mL Intravenous Continuous Govind Bello MD       • sodium chloride 0.9% infusion   Intravenous Continuous Govind Bello MD 83 mL/hr at 10/18/20 2253     • enoxaparin (LOVENOX) injection 40 mg  40 mg Subcutaneous Daily Ghada Lofton CNP   40 mg at 10/19/20 0915   • amLODIPine (NORVASC) tablet 5 mg  5 mg Oral Daily Olivier Montemayor MD   5 mg at 10/19/20 0914   • mirtazapine (REMERON) tablet 7.5 mg  7.5 mg Oral Nightly Ghada Lofton CNP   7.5 mg at 10/18/20 2150   • polyethylene glycol (MIRALAX) packet 17 g  17 g Oral Daily Ghada Lofton CNP   17 g at 10/17/20 0851   • acetaminophen (TYLENOL) tablet 650 mg  650 mg Oral Q6H PRN Olivier Montemayor MD   650 mg at 10/16/20 2112   • docusate sodium (COLACE) capsule 100 mg  100 mg Oral BID Sharath Montiel PA-C   100 mg at 10/18/20 2150   • [START ON 10/26/2020] heparin 100 UNIT/ML lock flush 100 Units  100 Units Intracatheter PRN Sharath Montiel PA-C       • [START ON 10/26/2020] heparin 100 UNIT/ML lock flush 100 Units  100 Units Intracatheter PRN Sharath Montiel PA-C       • [START ON 10/26/2020] heparin 100 UNIT/ML lock flush 100 Units  100 Units Intracatheter PRN Sharath Montiel PA-C       • [START ON 10/20/2020] amoxicillin-clavulanate (AUGMENTIN) 875-125 MG tablet 1 tablet  1 tablet Oral 2 times per day Govind Bello MD       • sodium phosphate 20 mmol in dextrose 5 % 250 mL IVPB  20 mmol Intravenous PRN Govind Bello MD       • potassium phosphate 40 mmol in dextrose 5 % 500 mL IVPB  40 mmol  Intravenous PRN Govind Bello MD       • magnesium sulfate 4 g in sterile water IVPB  4 g Intravenous PRN Govind Bello MD       • calcium gluconate 4 g in sodium chloride 0.9 % 250 mL IVPB   Intravenous PRN Govind Bello MD       • potassium phosphate 20 mmol in dextrose 5 % 250 mL IVPB  20 mmol Intravenous PRN Govind Bello MD       • sodium phosphate 40 mmol in dextrose 5 % 500 mL IVPB  40 mmol Intravenous PRN Govind Bello MD       • sodium chloride (PF) 0.9 % injection 10 mL  10 mL Intracatheter PRN Sharath Montiel PA-C       • sodium chloride (PF) 0.9 % injection 20 mL  20 mL Intracatheter PRN Sharath Montiel PA-C       • heparin 100 UNIT/ML lock flush 500 Units  5 mL Intracatheter PRN Sharath Montiel PA-C       • potassium CHLORIDE 20 MEQ/50ML IVPB premix 40 mEq  40 mEq Intravenous PRN Sharath Montiel PA-C       • alteplase (CATHFLO ACTIVASE) injection 2 mg  2 mg Intracatheter PRN Sharath Montiel PA-C   1 mg at 10/17/20 1204   • prochlorperazine (COMPAZINE) tablet 10 mg  10 mg Oral Q6H PRN Govind Bello MD       • LORazepam (ATIVAN) tablet 1 mg  1 mg Oral Q6H PRN Govind Bello MD       • valACYclovir (VALTREX) tablet 500 mg  500 mg Oral BID Govind Bello MD   500 mg at 10/19/20 0914   • fluconazole (DIFLUCAN) tablet 400 mg  400 mg Oral Daily Govind Bello MD   400 mg at 10/19/20 0914   • sodium chloride (NORMAL SALINE) 0.9 % bolus 1,000 mL  1,000 mL Intravenous Once PRN Govind Bello MD       • sodium chloride (NORMAL SALINE) 0.9 % bolus 1,000 mL  1,000 mL Intravenous Once PRN Govind Bello MD       • EPINEPHrine (ADRENALIN) injection 0.3 mg  0.3 mg Intramuscular Q5 Min PRN Govind Bello MD       • diphenhydrAMINE (BENADRYL) injection 50 mg  50 mg Intravenous Once PRN Govind Bello MD       • famotidine (PEPCID) injection 20 mg  20 mg Intravenous Once PRN Govind Bello MD       • methylPREDNISolone (SOLU-Medrol) PF injection 125 mg  125 mg Intravenous Once PRN  Govind Bello MD       • albuterol inhaler 2 puff  2 puff Inhalation Q20 Min PRN Govind Bello MD       • albuterol (VENTOLIN) nebulizer 5 mg  5 mg Nebulization Q20 Min PRN Govind Bello MD       • furosemide (LASIX INJECT) injection 20 mg  20 mg Intravenous BID PRN Govind Bello MD   20 mg at 10/18/20 1752   • aliskiren (TEKTURNA) tablet 300 mg  300 mg Oral QHS Sharath Montiel PA-C   300 mg at 10/18/20 2150   • finasteride (PROSCAR) tablet 5 mg  5 mg Oral Daily Sharath Montiel PA-C   5 mg at 10/19/20 0914   • zolpidem (AMBIEN) tablet 10 mg  10 mg Oral Nightly PRN Sharath Montiel PA-C   10 mg at 10/18/20 215   • diphenhydrAMINE (BENADRYL) capsule 25 mg  25 mg Oral Nightly PRN Sharath Montiel PA-C   25 mg at 10/15/20 2257   • [START ON 10/24/2020] filgrastim (G-CSF) (NEUPOGEN) injection 480 mcg  480 mcg Subcutaneous Daily at noon Govind Bello MD       • latanoprost (XALATAN) 0.005 % ophthalmic solution 1 drop  1 drop Both Eyes Nightly Minaromelia García, DO   1 drop at 10/18/20 2140        ALLERGY  ALLERGIES:   Allergen Reactions   • Iodinated Diagnostic Agents Other (See Comments)     bp drop   • Levaquin RASH     Per pt rash that started at ankles and moved up legs.  Pt felt reaction was really bad and his md friend told not to take again   • Penicillins Other (See Comments)     Unknown. When he was a kid.                     SOCIAL HISTORY    Social History     Tobacco Use   • Smoking status: Former Smoker     Types: Cigarettes     Quit date: 1979     Years since quittin.3   • Smokeless tobacco: Never Used   • Tobacco comment: quit 40 years ago    Substance Use Topics   • Alcohol use: Not Currently   • Drug use: Never       FAMILY HISTORY    Family History   Problem Relation Age of Onset   • Cancer Mother    • Stroke Father        Review of Systems  10 point ROS conducted.  As per HPI, rest of ROS is negative      PHYSICAL EXAMINATION  Vitals:    10/19/20 1412 10/19/20 1416 10/19/20 1418 10/19/20  1523   BP: 129/75 117/67 139/76 126/62   Pulse: 75 76 76 73   Resp: 16 20 20 16   Temp: 96.8 °F (36 °C) 97.3 °F (36.3 °C) 97.2 °F (36.2 °C) 97.7 °F (36.5 °C)   TempSrc: Tympanic Tympanic Tympanic    SpO2: 97% 97% 97% 97%   Weight:       Height:           Gen: NAD, awake, alert  HEENT:anicteric, oropharynx is clear  CVS: RRR nls1 s2  Lung: BCTA, faint crackles at R base, no increased respiratory effort  Abd: SNTND  Extr: no rash, no edema  Psych:  Appropriate mood and affect  Skin: no rash    REVIEW OF LABORATORY, PATHOLOGY, AND RADIOLOGY DATA  • Lab results:  CBC:  Lab Results   Component Value Date    WBC 2.8 (L) 10/19/2020    RBC 3.19 (L) 10/19/2020    HGB 10.5 (L) 10/19/2020    HCT 32.9 (L) 10/19/2020    .1 (H) 10/19/2020    MCH 32.9 10/19/2020    MCHC 31.9 (L) 10/19/2020     (L) 10/19/2020    AMONO 2.1 (H) 10/09/2020    AEO 1.4 (H) 10/09/2020    ABASO 0.0 10/19/2020       CMP:  Recent Labs   Lab 10/19/20  0345 10/18/20  0310 10/17/20  0400   SODIUM 141 141 140   POTASSIUM 4.0 3.8 3.8   CHLORIDE 110* 108* 109*   CO2 29 30 28   BUN 22* 19 17   CREATININE 0.68 0.76 0.74   GLUCOSE 99 99 100*   CALCIUM 8.5 8.7 8.4       •   •   • Imaging results: CXR reviewed from 9/22    ASSESSMENT   1.  Large B-cell lymphoma status post autologous stem cell transplant day 0  2.  Penicillin allergy  3.  Levaquin allergy    RECOMMENDATIONS  Continue fluconazole and Valtrex for prophylaxis.    We will plan on Augmentin for neutropenic prophylaxis and monitor closely for reaction  Encourage incentive spirometry    Thank you very much for this consultation.  This assessment and recommendations were discussed with the BMT PA Sharath Montiel.  We will continue to follow this patient with you and assist in the management in any way that we can.    Mary Johnson MD  [phone]     Yes

## 2024-03-19 NOTE — ED PROVIDER NOTE - PROGRESS NOTE DETAILS
Elena Walker, PGY-2 DO:  Patient evaluated by surgery, ENT, Dr. Mathews bedside. Patient TBDC. Patient will be given f/u and agreeable with plan.

## 2024-03-19 NOTE — ED PROVIDER NOTE - PHYSICAL EXAMINATION
CONSTITUTIONAL: Patient is awake, alert and oriented x 3. Patient is well appearing, speaking in clear full sentences  and in no acute distress  HEAD: NCAT  EYES: PERRL b/l, EOMI  ENT: Airway patent, Nasal mucosa clear. Mouth with normal mucosa. Throat with mild erythema, no vesicles, no oropharyngeal exudates and uvula is midline  NECK: supple, FROM, no LAD  LUNGS: CTA b/l, no wheezing or rales   HEART: RRR.+S1S2 no murmurs  ABDOMEN: Soft, non-distended, nttp,  no rebound or guarding  EXTREMITY: No edema or calf tenderness b/l, FROM upper and lower ext b/l  SKIN: No rash or lesions  NEURO: No focal deficits

## 2024-03-19 NOTE — CONSULT NOTE ADULT - ENT GEN HX ROS MEA POS PC
sinus symptoms/nasal congestion/nasal discharge/nasal obstruction/post-nasal discharge/dysphagia/sore throat

## 2024-03-19 NOTE — ED PROVIDER NOTE - OBJECTIVE STATEMENT
48-year-old male with past medical history of hypertension, hyperlipidemia, diabetes, CHF, Wegener's with prior laryngeal inflammatory mass status post removal presents the emergency department at the request of his rheumatologist for finding of supraglottitis on outpatient CT.  Patient reports that CT scan was ordered as a routine follow-up for his prior laryngeal mass.  CT scan performed yesterday 3/18 and patient received a call today to come to the emergency department for abnormal CT  finding.  Per chart documentation rheumatology spoke to ENT Dr. Onofre suggested patient be evaluated in the emergency department this evening.  Patient reports that he was seen in the ED a few days ago for shortness of breath and was DC home after diuresis and a PE study negative. Patient reports that he has had mild dry cough. Denies fevers, sore throat, difficulty breathing, difficulty swallowing, muffled voice, chest pain, abdominal pain, n/v

## 2024-03-21 DIAGNOSIS — I50.9 HEART FAILURE, UNSPECIFIED: ICD-10-CM

## 2024-03-27 ENCOUNTER — APPOINTMENT (OUTPATIENT)
Dept: OTOLARYNGOLOGY | Facility: CLINIC | Age: 49
End: 2024-03-27
Payer: COMMERCIAL

## 2024-03-27 VITALS
BODY MASS INDEX: 34.39 KG/M2 | OXYGEN SATURATION: 97 % | SYSTOLIC BLOOD PRESSURE: 107 MMHG | DIASTOLIC BLOOD PRESSURE: 72 MMHG | HEIGHT: 74 IN | WEIGHT: 268 LBS | HEART RATE: 83 BPM

## 2024-03-27 PROCEDURE — 31579 LARYNGOSCOPY TELESCOPIC: CPT

## 2024-03-27 PROCEDURE — 99214 OFFICE O/P EST MOD 30 MIN: CPT | Mod: 25

## 2024-03-27 RX ORDER — OMEPRAZOLE 40 MG/1
40 CAPSULE, DELAYED RELEASE ORAL
Qty: 90 | Refills: 1 | Status: ACTIVE | COMMUNITY
Start: 2021-06-17 | End: 1900-01-01

## 2024-03-27 RX ORDER — AZELASTINE HYDROCHLORIDE 137 UG/1
0.1 SPRAY, METERED NASAL
Refills: 0 | Status: COMPLETED | COMMUNITY
Start: 2020-02-22 | End: 2024-03-27

## 2024-03-27 RX ORDER — FLUTICASONE PROPIONATE 50 MCG
SPRAY, SUSPENSION NASAL
Refills: 0 | Status: ACTIVE | COMMUNITY

## 2024-03-27 NOTE — HISTORY OF PRESENT ILLNESS
[de-identified] : 48 year old man with hx of dysphonia and laryngeal mass presents for follow-up Was started on Nystatin last clinic visit--was renewed by Dr. Carreon(Rheum) and using BID. Voice has been good since last clinic visist.  Was seen for CT with dr carreon, concern for acute supraglottitis with airway obstruction Not taking Famotidine and Omeprazole--needs REFILL. Maintaining reflux precautions and improved hydration. Patient denies throat pain, globus sensation, dysphagia, odynophagia, dyspnea, dysphonia, hemoptysis, otalgia, recent fevers or infections.  Using Flonase PRN with noted relief of nasal congestion.  CT SOFT TISSUE NECK WITH CONTRAST 3/18/2024 IMPRESSION: Thickening of the aryepiglottic folds with effacement of the piriform sinuses may be seen with supraglottitis. No pathologic cervical lymphadenopathy by imaging size criteria.

## 2024-03-27 NOTE — PHYSICAL EXAM
[FreeTextEntry1] : mildly raspy voice, much improved [Laryngoscopy Performed] : laryngoscopy was performed, see procedure section for findings [Midline] : trachea located in midline position [de-identified] : thrush  [Normal] : no rashes [de-identified] : improved voice, moderately raspy, not breathy

## 2024-03-27 NOTE — CONSULT LETTER
[Dear  ___] : Dear  [unfilled], [Courtesy Letter:] : I had the pleasure of seeing your patient, [unfilled], in my office today. [Please see my note below.] : Please see my note below. [Consult Closing:] : Thank you very much for allowing me to participate in the care of this patient.  If you have any questions, please do not hesitate to contact me. [Sincerely,] : Sincerely, [FreeTextEntry3] : Olvin Onofre MD, PhD\par  Chief, Division of Laryngology\par  Department of Otolaryngology\par  [facility]\par  Pediatric Otolaryngology, [facility]\par   of Otolaryngology\par  [employer]\par

## 2024-03-27 NOTE — REASON FOR VISIT
[Subsequent Evaluation] : a subsequent evaluation for [FreeTextEntry2] : dysphonia and laryngeal mass

## 2024-04-12 ENCOUNTER — APPOINTMENT (OUTPATIENT)
Dept: ENDOCRINOLOGY | Facility: CLINIC | Age: 49
End: 2024-04-12
Payer: COMMERCIAL

## 2024-04-12 VITALS
WEIGHT: 265.5 LBS | DIASTOLIC BLOOD PRESSURE: 82 MMHG | OXYGEN SATURATION: 99 % | HEIGHT: 74 IN | SYSTOLIC BLOOD PRESSURE: 124 MMHG | HEART RATE: 95 BPM | TEMPERATURE: 98.4 F | BODY MASS INDEX: 34.07 KG/M2

## 2024-04-12 LAB — GLUCOSE BLDC GLUCOMTR-MCNC: 108

## 2024-04-12 PROCEDURE — 95251 CONT GLUC MNTR ANALYSIS I&R: CPT

## 2024-04-12 PROCEDURE — 82962 GLUCOSE BLOOD TEST: CPT

## 2024-04-12 PROCEDURE — 99214 OFFICE O/P EST MOD 30 MIN: CPT

## 2024-04-12 NOTE — HISTORY OF PRESENT ILLNESS
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   Screening  Ophthalmology: follows LDL: on lipitor 40mg po daily EGFR: 80   Current diabetic medication regimen (verified with patient):   metformin 1 g po bid glipizide 10mg po daily  Lantus 34 units Qhs ozempic 0.25mg Q weekly   Allergy to Januvia (hives) and allergy to jardiance (hives) reported   Ambulatory glucose profile (AGP):    Device: Abbott Freestyle Leah 2  Glucose Management Indicator (GMI): 7.4%  Average Bmg/dl   TIR:   TIR >250 mg/dl: 10%  TIR >180mg/dl: 32%  TIR 70 to 180mg/dl: 55%  TIR <70mg/dl: 0%  TIR <54mg/dl: 0 %   Coefficient of variation: 32.2%  Time (%) CGM active: 82   AGP: PP hyperglycemia after lunch and dinner

## 2024-04-18 NOTE — ED PROVIDER NOTE - ATTENDING SHARED VISIT SELECTOR YES
Goal Outcome Evaluation:      Plan of Care Reviewed With: patient    Overall Patient Progress: no changeOverall Patient Progress: no change            Yes

## 2024-05-03 NOTE — PATIENT PROFILE ADULT. - NS PRO PT REFERRAL QUES 2 YN
Population Health Chart Review & Patient Outreach Details      Additional [facility] Health Notes:               Updates Requested / Reviewed:      Updated Care Coordination Note and Immunizations Reconciliation Completed or Queried: [place]         Health Maintenance Topics Overdue:      [facility] Score: 0     Patient is not due for any topics at this time.                       Health Maintenance Topic(s) Outreach Outcomes & Actions Taken:    Breast Cancer Screening - Outreach Outcomes & Actions Taken  : Mammogram Screening Scheduled         no

## 2024-05-21 ENCOUNTER — APPOINTMENT (OUTPATIENT)
Dept: RHEUMATOLOGY | Facility: CLINIC | Age: 49
End: 2024-05-21
Payer: COMMERCIAL

## 2024-05-21 VITALS
SYSTOLIC BLOOD PRESSURE: 127 MMHG | HEIGHT: 74 IN | TEMPERATURE: 98.1 F | DIASTOLIC BLOOD PRESSURE: 80 MMHG | RESPIRATION RATE: 16 BRPM | BODY MASS INDEX: 34.01 KG/M2 | OXYGEN SATURATION: 98 % | HEART RATE: 106 BPM | WEIGHT: 265 LBS

## 2024-05-21 DIAGNOSIS — J38.6 STENOSIS OF LARYNX: ICD-10-CM

## 2024-05-21 DIAGNOSIS — D47.2 MONOCLONAL GAMMOPATHY: ICD-10-CM

## 2024-05-21 DIAGNOSIS — B37.0 CANDIDAL STOMATITIS: ICD-10-CM

## 2024-05-21 PROCEDURE — G2211 COMPLEX E/M VISIT ADD ON: CPT | Mod: NC,1L

## 2024-05-21 PROCEDURE — 99214 OFFICE O/P EST MOD 30 MIN: CPT

## 2024-05-21 RX ORDER — PREDNISONE 50 MG/1
50 TABLET ORAL
Qty: 3 | Refills: 0 | Status: DISCONTINUED | COMMUNITY
Start: 2018-04-19 | End: 2024-05-21

## 2024-05-21 NOTE — ASSESSMENT
[FreeTextEntry1] : 1. 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 CHF -  EF at 35-40% - under the care of cardiology 1 hospitalization for exacerbation of CHF 4. S/p covid 19 - resolved! 5. MGUS - normal now - repeat today 6. left shoulder pain - resolved 7. bilateral elbow epicondylitis - not active at this time.  7. thrush - again - needs to re-start nystatin  I reviewed previous labs results with patients. labs next visit - done outside at PCP Diagnosis and Prognosis discussed Continue with current medications medications refilled education provided on thrush prevention F/u 3 months   l

## 2024-05-21 NOTE — HISTORY OF PRESENT ILLNESS
[___ Month(s) Ago] : [unfilled] month(s) ago [None] : The patient is currently asymptomatic [FreeTextEntry1] : voice is stable  admitted to the er for pulmonary edema -  resolved after IV diuretic last seen by cardiology - last week -  on ozempic now for DM2 - recent A1C at 8.6 seen by ANGÉLICA -normal exam

## 2024-05-21 NOTE — PHYSICAL EXAM
[General Appearance - Alert] : alert [General Appearance - In No Acute Distress] : in no acute distress [Neck Appearance] : the appearance of the neck was normal [Neck Cervical Mass (___cm)] : no neck mass was observed [Jugular Venous Distention Increased] : there was no jugular-venous distention [Thyroid Diffuse Enlargement] : the thyroid was not enlarged [Thyroid Nodule] : there were no palpable thyroid nodules [Auscultation Breath Sounds / Voice Sounds] : lungs were clear to auscultation bilaterally [Heart Rate And Rhythm] : heart rate was normal and rhythm regular [Heart Sounds] : normal S1 and S2 [Heart Sounds Gallop] : no gallops [Murmurs] : no murmurs [Heart Sounds Pericardial Friction Rub] : no pericardial rub [Full Pulse] : the pedal pulses are present [Edema] : there was no peripheral edema [Bowel Sounds] : normal bowel sounds [Abdomen Soft] : soft [Abdomen Tenderness] : non-tender [Abdomen Mass (___ Cm)] : no abdominal mass palpated [Cervical Lymph Nodes Enlarged Posterior Bilaterally] : posterior cervical [Cervical Lymph Nodes Enlarged Anterior Bilaterally] : anterior cervical [Supraclavicular Lymph Nodes Enlarged Bilaterally] : supraclavicular [Skin Color & Pigmentation] : normal skin color and pigmentation [Skin Turgor] : normal skin turgor [] : no rash [Oriented To Time, Place, And Person] : oriented to person, place, and time [Impaired Insight] : insight and judgment were intact [Affect] : the affect was normal [Abnormal Walk] : normal gait [Nail Clubbing] : no clubbing  or cyanosis of the fingernails [Musculoskeletal - Swelling] : no joint swelling seen [Motor Tone] : muscle strength and tone were normal [FreeTextEntry1] : all joints with full ROM - no synovitis

## 2024-05-22 PROBLEM — J38.6 SUBGLOTTIC STENOSIS: Status: ACTIVE | Noted: 2023-03-01

## 2024-05-22 PROBLEM — B37.0 THRUSH: Status: ACTIVE | Noted: 2017-09-07

## 2024-05-22 PROBLEM — D47.2 MGUS (MONOCLONAL GAMMOPATHY OF UNKNOWN SIGNIFICANCE): Status: ACTIVE | Noted: 2022-12-27

## 2024-06-20 NOTE — ED PROVIDER NOTE - PROGRESS NOTE DETAILS
[facility]  Outpatient Pediatric Speech Therapy Daily Note     Date: 6/20/2024   Time In: 2:00 PM  Time Out: 2:30 PM    Name: Elizabet Waddell   MRN: [id number]   Medical Diagnosis: Other disorder of psychological development   Referring Physician: Flynn Carl MD  Age: 18 m.o.     Date of Initial Evaluation: 02-  Date of Re-Evaluation: n/a  Precautions: Standard     UNTIMED  Procedure Min.   Speech- Language- Voice Therapy    30 minutes     Total Minutes: 30 minutes  Total Untimed Units: 1  Charges Billed/# of units: 1      Subjective:   Elizabet transitioned to speech therapy with the therapist. She required minimal prompts to remain on task during her 30 minute appointment.  Pain: Patient did not verbalize or display any signs or symptoms of pain this session. Child is too young to understand and rate pain levels.      Objective:   Long-Term Goals:  Elizabet will improve her oral motor skills for the purposes of speech and feeding to an age-appropriate level. - Initial  Elizabet will improve her expressive language skills to an age-appropriate level. - Initial  Elizabet will improve her play skills to an age-appropriate level. - Initial     Short-Term Objectives:  Elizabet and her caregivers will participate in home-based activities designed to encourage carryover of skills in the home environment. - Initial  Elizabet will use toys appropriately in 3 of 5 opportunities given minimal support. - Initial  Elizabet will imitate models in play in 3 of 5 opportunities given minimal support. - Initial  Elizabet will produce word approximations in imitation and delayed imitation in 3 of 5 opportunities. - Initial  Elizabet will expand her phoneme repertoire to include /p, b, n, t, d/. - Initial  Elizabet will participate with oral motor tools (i.e., z-vibe and chew tubes) at least once per session provided maximal support. - Initial       Patient Education/Response:   Therapist discussed patient's goals with her  "grandfather after the session. Family verbalized understanding of Home Exercise Program, Speech and Language Strategies, and SLP treatment plan. Strategies were introduced to work on expanding speech and language skills. Grandfather verbalized understanding of all discussed.        Assessment:   Elizabet, a 18 month old female, was referred to speech and language therapy with a diagnosis of Other disorders of psychological development. She attends treatment twice a week for thirty minute sessions. Upon entrance into the waiting room, Elizabet Granger's g-tube stated "No Flow." Initially, the formula did not appear to be completely dissolved. Once dissolved, the g-tube continued to indicate "No Flow." Once the therapist began to inspect the tubes, there appeared to be build up. Once cleared, her g-tube ran without error. This is being noted because attempting to resolve the error occurred often throughout the session, therefore less oral motor was done during today's session. Furthermore, Elizabet Granger soiled her diaper during the session, requiring a diaper change. Despite this, Elizabet Granger was happy throughout the session. She successfully clapped without the therapist's assistance on two occasions. She used vocalizations occasionally, and participated in short vocal exchanges with the therapist. The therapist used familiar songs to help Elizabet Granger anticipate densensitizing activities. She tolerated a gloved finger rubbing her outer lips for up to 4 seconds on several occasions. After, she consistently licked her lips. Near the end of the session, the therapist playfully stuck her tongue out and Elizabet Granger imitated on 5 occasions. She used her jaw to support tongue tip movement, and her tongue often trembled due to poor endurance. This was the first instance in which Elizabet Granger attempted to imitate oral motor exercises!    Current goals remain appropriate. Pt prognosis is good. Pt will continue to benefit from skilled outpatient " speech and language therapy to address the deficits listed in the problem list on initial evaluation. Will continue to provide family with education to maximize pt's level of independence in the home and community environment.   Barriers to Therapy: No barriers to learning evident. Spiritual/cultural beliefs not needed to be incorporated into treatment sessions. Family agreeable to plan of care and goals.      Plan:   Updated Certification Period: 05- to 08-   Continue speech and language therapy twice a week for 30 minutes as planned. Continue implementation of a home program to facilitate carryover of targeted speech and langauge skills.        Rosalia Alan, [facility]-SLP              Toñito Jacobs, PGY 3: patient sat 96% on RA. no LE, tachycardiac and febrile. Toñito Jacobs, PGY 3: HR improved, will d/c with pulse ox and return precaution is provided.

## 2024-06-22 NOTE — H&P PST ADULT - NSANTHOSAYNRD_GEN_A_CORE
Currently on 2L NC  CT CAP: Sigmoid diverticulitis with no fluid collection or abscess. Consider colonoscopy when the acute illness subsides. Trace left effusion. No acute airspace consolidation. Dilated ascending artery measuring 4.4 cm, annual surveillance generally suggested. Cholelithiasis. No intra-abdominal hemorrhage  Encourage IS  Wean supplemental O2 to maintain spO2 > 92%   No. MAR screening performed.  STOP BANG Legend: 0-2 = LOW Risk; 3-4 = INTERMEDIATE Risk; 5-8 = HIGH Risk

## 2024-06-25 ENCOUNTER — APPOINTMENT (OUTPATIENT)
Dept: ENDOCRINOLOGY | Facility: CLINIC | Age: 49
End: 2024-06-25
Payer: COMMERCIAL

## 2024-06-25 VITALS
OXYGEN SATURATION: 96 % | WEIGHT: 262 LBS | DIASTOLIC BLOOD PRESSURE: 64 MMHG | SYSTOLIC BLOOD PRESSURE: 118 MMHG | TEMPERATURE: 97.9 F | HEIGHT: 74 IN | HEART RATE: 96 BPM | BODY MASS INDEX: 33.62 KG/M2

## 2024-06-25 DIAGNOSIS — Z97.8 PRESENCE OF OTHER SPECIFIED DEVICES: ICD-10-CM

## 2024-06-25 DIAGNOSIS — Z79.4 LONG TERM (CURRENT) USE OF INSULIN: ICD-10-CM

## 2024-06-25 DIAGNOSIS — E11.9 TYPE 2 DIABETES MELLITUS W/OUT COMPLICATIONS: ICD-10-CM

## 2024-06-25 DIAGNOSIS — E66.9 OBESITY, UNSPECIFIED: ICD-10-CM

## 2024-06-25 LAB
GLUCOSE BLDC GLUCOMTR-MCNC: 151
HBA1C MFR BLD HPLC: ABNORMAL

## 2024-06-25 PROCEDURE — 83036 HEMOGLOBIN GLYCOSYLATED A1C: CPT | Mod: QW

## 2024-06-25 PROCEDURE — 82962 GLUCOSE BLOOD TEST: CPT

## 2024-06-25 PROCEDURE — 95251 CONT GLUC MNTR ANALYSIS I&R: CPT

## 2024-06-25 PROCEDURE — 99214 OFFICE O/P EST MOD 30 MIN: CPT

## 2024-08-19 ENCOUNTER — NON-APPOINTMENT (OUTPATIENT)
Age: 49
End: 2024-08-19

## 2024-08-20 ENCOUNTER — APPOINTMENT (OUTPATIENT)
Dept: RHEUMATOLOGY | Facility: CLINIC | Age: 49
End: 2024-08-20
Payer: COMMERCIAL

## 2024-08-20 VITALS
DIASTOLIC BLOOD PRESSURE: 89 MMHG | WEIGHT: 255 LBS | HEIGHT: 74 IN | HEART RATE: 98 BPM | BODY MASS INDEX: 32.73 KG/M2 | SYSTOLIC BLOOD PRESSURE: 145 MMHG | OXYGEN SATURATION: 96 %

## 2024-08-20 DIAGNOSIS — J38.6 STENOSIS OF LARYNX: ICD-10-CM

## 2024-08-20 DIAGNOSIS — E11.9 TYPE 2 DIABETES MELLITUS W/OUT COMPLICATIONS: ICD-10-CM

## 2024-08-20 PROCEDURE — G2211 COMPLEX E/M VISIT ADD ON: CPT | Mod: NC

## 2024-08-20 PROCEDURE — 99214 OFFICE O/P EST MOD 30 MIN: CPT

## 2024-08-20 NOTE — HISTORY OF PRESENT ILLNESS
[___ Month(s) Ago] : [unfilled] month(s) ago [None] : The patient is currently asymptomatic [FreeTextEntry1] : voice is stable  taking diuretic as needed for CHF on ozempic now for DM2 - recent A1C at 8.2 feels well today with no complains.

## 2024-08-20 NOTE — ASSESSMENT
[FreeTextEntry1] : 1. 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 CHF -  EF at 35-40% - under the care of cardiology 1 hospitalization for exacerbation of CHF 4. S/p covid 19 - resolved! 5. MGUS - normal now - repeat today 6. left shoulder pain - resolved 7. bilateral elbow epicondylitis - not active at this time.  7. thrush - again - needs to re-start nystatin  I reviewed previous labs results with patients. labs today Diagnosis and Prognosis discussed Continue with current medications medications refilled education provided on thrush prevention F/u6 months   l

## 2024-08-22 LAB
ALBUMIN SERPL ELPH-MCNC: 4.3 G/DL
ALP BLD-CCNC: 48 U/L
ALT SERPL-CCNC: 23 U/L
ANION GAP SERPL CALC-SCNC: 14 MMOL/L
AST SERPL-CCNC: 15 U/L
BASOPHILS # BLD AUTO: 0.04 K/UL
BASOPHILS NFR BLD AUTO: 0.5 %
BILIRUB SERPL-MCNC: 0.2 MG/DL
BUN SERPL-MCNC: 20 MG/DL
CALCIUM SERPL-MCNC: 9.7 MG/DL
CHLORIDE SERPL-SCNC: 103 MMOL/L
CO2 SERPL-SCNC: 23 MMOL/L
CREAT SERPL-MCNC: 1.2 MG/DL
CRP SERPL-MCNC: 14 MG/L
EGFR: 74 ML/MIN/1.73M2
EOSINOPHIL # BLD AUTO: 0.19 K/UL
EOSINOPHIL NFR BLD AUTO: 2.6 %
ERYTHROCYTE [SEDIMENTATION RATE] IN BLOOD BY WESTERGREN METHOD: 29 MM/HR
ESTIMATED AVERAGE GLUCOSE: 186 MG/DL
GLUCOSE SERPL-MCNC: 233 MG/DL
HBA1C MFR BLD HPLC: 8.1 %
HCT VFR BLD CALC: 43.8 %
HGB BLD-MCNC: 13.8 G/DL
IMM GRANULOCYTES NFR BLD AUTO: 0.1 %
LYMPHOCYTES # BLD AUTO: 3 K/UL
LYMPHOCYTES NFR BLD AUTO: 40.3 %
MAN DIFF?: NORMAL
MCHC RBC-ENTMCNC: 31.2 PG
MCHC RBC-ENTMCNC: 31.5 GM/DL
MCV RBC AUTO: 98.9 FL
MONOCYTES # BLD AUTO: 0.61 K/UL
MONOCYTES NFR BLD AUTO: 8.2 %
NEUTROPHILS # BLD AUTO: 3.59 K/UL
NEUTROPHILS NFR BLD AUTO: 48.3 %
PLATELET # BLD AUTO: 248 K/UL
POTASSIUM SERPL-SCNC: 4.4 MMOL/L
PROT SERPL-MCNC: 6.9 G/DL
RBC # BLD: 4.43 M/UL
RBC # FLD: 13.7 %
SODIUM SERPL-SCNC: 139 MMOL/L
WBC # FLD AUTO: 7.44 K/UL

## 2024-08-30 NOTE — ED ADULT TRIAGE NOTE - SPO2 (%)
Transplant SW:   Spoke with pt about support plan post transplant. Pt is working on firming up the plan this weekend and will provide writer with names, phone numbers and plan on Tuesday.  Pt's mother and stepfather will come from [place]. Mom will stay at pt's home with wife and 11 yo daughter to help there.  Stepfather will stay with pt in [place] in transplant housing. Pt is working out who else can assist and will allow stepfather to go home for a few days to spend time with pt's mother.  Will f/u with pt on Tuesday.  
99

## 2024-10-08 ENCOUNTER — APPOINTMENT (OUTPATIENT)
Dept: OTOLARYNGOLOGY | Facility: CLINIC | Age: 49
End: 2024-10-08
Payer: COMMERCIAL

## 2024-10-08 VITALS — SYSTOLIC BLOOD PRESSURE: 151 MMHG | HEART RATE: 107 BPM | OXYGEN SATURATION: 97 % | DIASTOLIC BLOOD PRESSURE: 93 MMHG

## 2024-10-08 PROCEDURE — 31579 LARYNGOSCOPY TELESCOPIC: CPT

## 2024-10-08 PROCEDURE — 99214 OFFICE O/P EST MOD 30 MIN: CPT | Mod: 25

## 2024-10-08 RX ORDER — CLOTRIMAZOLE 10 MG/1
10 LOZENGE ORAL
Qty: 1 | Refills: 1 | Status: ACTIVE | COMMUNITY
Start: 2024-10-08 | End: 1900-01-01

## 2024-10-15 ENCOUNTER — APPOINTMENT (OUTPATIENT)
Dept: ENDOCRINOLOGY | Facility: CLINIC | Age: 49
End: 2024-10-15
Payer: COMMERCIAL

## 2024-10-15 VITALS
OXYGEN SATURATION: 97 % | SYSTOLIC BLOOD PRESSURE: 140 MMHG | RESPIRATION RATE: 18 BRPM | DIASTOLIC BLOOD PRESSURE: 80 MMHG | BODY MASS INDEX: 32.85 KG/M2 | HEIGHT: 74 IN | WEIGHT: 256 LBS | TEMPERATURE: 98 F | HEART RATE: 104 BPM

## 2024-10-15 DIAGNOSIS — E11.9 TYPE 2 DIABETES MELLITUS W/OUT COMPLICATIONS: ICD-10-CM

## 2024-10-15 DIAGNOSIS — Z79.4 LONG TERM (CURRENT) USE OF INSULIN: ICD-10-CM

## 2024-10-15 DIAGNOSIS — E66.811 OBESITY, CLASS 1: ICD-10-CM

## 2024-10-15 DIAGNOSIS — Z97.8 PRESENCE OF OTHER SPECIFIED DEVICES: ICD-10-CM

## 2024-10-15 LAB
GLUCOSE BLDC GLUCOMTR-MCNC: 167
HBA1C MFR BLD HPLC: 8.4

## 2024-10-15 PROCEDURE — 99214 OFFICE O/P EST MOD 30 MIN: CPT

## 2024-10-15 PROCEDURE — 82962 GLUCOSE BLOOD TEST: CPT

## 2024-10-15 PROCEDURE — 83036 HEMOGLOBIN GLYCOSYLATED A1C: CPT | Mod: QW

## 2024-10-15 PROCEDURE — 95251 CONT GLUC MNTR ANALYSIS I&R: CPT

## 2024-12-16 NOTE — REASON FOR VISIT
UROLOGY OFFICE VISIT NOTE-MALE    The 21st Century Cures Act makes medical notes like these available to patients in the interest of transparency. However, be advised this is a medical document. It is intended as peer to peer communication. It is written in medical language and may contain abbreviations or verbiage that are unfamiliar. It may appear blunt or direct. Medical documents are intended to carry relevant information, facts as evident, and the clinical opinion of the practitioner.    UROLOGY CHIEF COMPLAINT  Chief Complaint   Patient presents with    Follow-up     1 YEAR FUV PROSTATE CA         ASSESSMENT  1.  History of prostate cancer, no evidence of disease stable PSA  2.  Lower urinary tract symptoms, not a current bother.    PLAN  Follow-up with me in 1 year with PSA.    SUBJECTIVE  Tung Jensen is a 71 year old male who presents in follow-up for  prostate cancer.  He was diagnosed with  high-volume, unfavorable intermediate risk,  grade group 2, grade 3+4, T2b prostate cancer in March of 2022.  He eventually underwent transperineal I-125 prostate brachytherapy in August of 2022.  He had no neoadjuvant ADT.  Most recent PSA was down to 1.01 on December 7th 2024.  He continued tamsulosin.  He is voiding every 1/2 hour in the morning after coffee then every 1-2 hours after that.  He has relatively small volumes.  He has.  Time 0-1.  He has a moderate urine flow.  He denies any urgency incontinence although he does have some urgency.  There is no dysuria or hematuria.  His bowels are moving well without pain or bleeding.     ACTIVE PROBLEMS  Patient Active Problem List   Diagnosis    Osteoarthritis    Colonic polyp    Hypertension    Health care maintenance    Elevated fasting glucose    Prostate cancer  (CMD)       HISTORIES  Past Medical History:   Diagnosis Date    HTN (hypertension)     Osteoarthritis     left hip    Presence of dental prosthetic device     \"flipper upper denture\"    Prostate  carcinoma  (CMD) 02/23/2022    Psoriasis     Tubular adenoma of colon        Past Surgical History:   Procedure Laterality Date    Colonoscopy  12/01/2010    Colonoscopy w/ biopsies and polypectomy  07/21/2021    3 polyps removed and bx    Hernia repair  07/26/2007    Umbilical    Joint replacement  01/05/2012    Left total hip    Us prostate w biopsy  04/08/2021    Us prostate w biopsy w fusion  02/23/2022       Family History   Problem Relation Age of Onset    Heart disease Mother     Obesity Mother     Cancer Father     Patient is unaware of any medical problems Daughter     Patient is unaware of any medical problems Son     Patient is unaware of any medical problems Sister     Patient is unaware of any medical problems Brother     Patient is unaware of any medical problems Brother     Patient is unaware of any medical problems Brother     Patient is unaware of any medical problems Brother     Patient is unaware of any medical problems Brother     Patient is unaware of any medical problems Brother     Patient is unaware of any medical problems Brother     Patient is unaware of any medical problems Sister     Patient is unaware of any medical problems Sister     Patient is unaware of any medical problems Sister     Patient is unaware of any medical problems Sister        Social History     Socioeconomic History    Marital status:      Spouse name: Not on file    Number of children: 2    Years of education: Not on file    Highest education level: Not on file   Occupational History    Occupation: Delivery -  prop[employer]      Employer: [employer]   Tobacco Use    Smoking status: Every Day     Current packs/day: 1.00     Average packs/day: 1 pack/day for 51.0 years (51.0 ttl pk-yrs)     Types: Cigarettes     Start date: 12/23/1973    Smokeless tobacco: Never    Tobacco comments:     Smoked age 21 to age 49,  quit until 2019 and started again, smoking history is approximate per patient report   Vaping Use     Vaping status: never used   Substance and Sexual Activity    Alcohol use: Yes     Comment: couple drinks monthly    Drug use: No    Sexual activity: Not on file   Other Topics Concern    Not on file   Social History Narrative    Not on file     Social Determinants of Health     Financial Resource Strain: Not on file   Food Insecurity: Not on file   Transportation Needs: Not on file   Physical Activity: Not on file   Stress: Not on file   Social Connections: Not on file   Interpersonal Safety: Not on file (8/24/2022)       ALLERGIES  ALLERGIES:  No Known Allergies    MEDICATIONS  Current Outpatient Medications   Medication Sig    metFORMIN (GLUCOPHAGE) 500 MG tablet Take 1 tablet by mouth daily (with breakfast).    atorvastatin (LIPITOR) 40 MG tablet Take 1 tablet by mouth daily.    valsartan-hydrochlorothiazide (DIOVAN-HCT) 160-25 MG per tablet Take 1 tablet by mouth daily.    tamsulosin (FLOMAX) 0.4 MG Cap Take 1 capsule by mouth daily after a meal.    diphenhydrAMINE-APAP, sleep, (TYLENOL PM EXTRA STRENGTH PO) Take by mouth as needed.    Cyanocobalamin (VITAMIN B-12 PO) Take 1 tablet by mouth daily.     aspirin 81 MG tablet Take 81 mg by mouth daily.    cholecalciferol (VITAMIN D3) 1000 UNITS tablet Take 1,000 Units by mouth daily.    Daily Multiple Vitamins TABS Take 1 tablet by mouth daily.     No current facility-administered medications for this visit.       REVIEW OF SYSTEMS  As noted in the HPI.    PHYSICIAL EXAM    Vital Signs: Visit Vitals  /70   Pulse 73   Temp 97.5 °F (36.4 °C) (Temporal)   Ht 6' 3\" (1.905 m)   Wt 106.2 kg (234 lb 1.6 oz)   BMI 29.26 kg/m²     General: The patient is well developed, well nourished, in no acute distress  HEENT: Normocephalic.  Neck: Symmetric. Trachea midline.  Respiratory: Respiratory effort normal.  Cardiovascular: Regular rate and rhythm.  Back: No costovertebral angle tenderness.  Abdomen: Not obese. Non-distended. Soft. No obvious masses.  No  hernia.  Genitourinary:  Penis is not circumcised. Foreskin is retractable. Urethral meatus patent w/o lesions. Testes descended bilaterally w/o masses or tenderness. Vas, epididymis normal bilaterally.  Neurologic: No gross neurologic findings. Moves all extremities. Strength grossly intact. Gait is normal.  Psychiatric: Normal mood and affect. Alert and oriented.  Skin: Warm and dry. No rashes where examined  Extremities: No swelling.    PERTINENT RESULTS    Urinalysis: Dipstick positive for trace protein.  Microscopically it is negative.    Prostate Specific Antigen (ng/mL)   Date Value   12/07/2024 1.01   12/07/2023 1.03   06/08/2023 1.28   11/30/2022 1.63   09/28/2021 6.48 (H)   03/17/2021 5.99 (H)   09/16/2020 4.67 (H)   02/18/2020 4.07 (H)       Lab Results   Component Value Date    SODIUM 140 12/07/2024    POTASSIUM 3.8 12/07/2024    CHLORIDE 106 12/07/2024    CO2 28 12/07/2024    CALCIUM 9.0 12/07/2024    BUN 15 12/07/2024    GLUCOSE 121 (H) 12/07/2024    CREATININE 0.79 12/07/2024    CREATININE 0.80 06/08/2023    CREATININE 0.70 11/30/2022       Lab Results   Component Value Date    WBC 9.3 12/07/2024    HGB 13.6 12/07/2024    HCT 41.2 12/07/2024     12/07/2024       Imaging:    No results found.      YOSHI FRAIRE MD       [Follow-Up: _____] : a [unfilled] follow-up visit

## 2024-12-17 ENCOUNTER — APPOINTMENT (OUTPATIENT)
Dept: RHEUMATOLOGY | Facility: CLINIC | Age: 49
End: 2024-12-17

## 2025-01-02 NOTE — ED ADULT NURSE NOTE - NS ED NURSE LEVEL OF CONSCIOUSNESS SPEECH
Next Stop(s):  -Scheduling (waiting room)  -Lab (basement)    To schedule your colonoscopy, please call [phone].     I've ordered you a mammogram. To schedule, please call [phone].         Speaking Coherently

## 2025-01-10 ENCOUNTER — NON-APPOINTMENT (OUTPATIENT)
Age: 50
End: 2025-01-10

## 2025-03-19 ENCOUNTER — RX RENEWAL (OUTPATIENT)
Age: 50
End: 2025-03-19

## 2025-03-19 NOTE — DISCHARGE NOTE ADULT - FUNCTIONAL SCREEN CURRENT LEVEL: DRESSING, MLM
NB-UVB treatment for psoriasis (DX)  Areas being treated full body  Skin Type 4  Treatment # 10  Issues following previous treatment : none  Photoprotection on eyes, lips, nipples  383 mj 0 min 44 seconds today .   Mineral oil applied none (by patient, staff, or none)  Goal 2-3 times weekly for a total of 12 weeks or maintenance dosing for 12 weeks (if this time line has passed we need documentation of maintenance dosing )  Last treatment:  No complications     UVB treatment order placed Idania Marr PA-C.  Pt is due for follow up with provider 04/28/25    Jacquelyn Feldman LPN  
(0) independent

## 2025-04-26 ENCOUNTER — NON-APPOINTMENT (OUTPATIENT)
Age: 50
End: 2025-04-26

## 2025-04-28 ENCOUNTER — APPOINTMENT (OUTPATIENT)
Dept: ENDOCRINOLOGY | Facility: CLINIC | Age: 50
End: 2025-04-28
Payer: COMMERCIAL

## 2025-04-28 VITALS
HEIGHT: 74 IN | DIASTOLIC BLOOD PRESSURE: 96 MMHG | BODY MASS INDEX: 33.24 KG/M2 | TEMPERATURE: 97 F | WEIGHT: 259 LBS | OXYGEN SATURATION: 98 % | HEART RATE: 97 BPM | RESPIRATION RATE: 18 BRPM | SYSTOLIC BLOOD PRESSURE: 156 MMHG

## 2025-04-28 DIAGNOSIS — Z79.4 LONG TERM (CURRENT) USE OF INSULIN: ICD-10-CM

## 2025-04-28 DIAGNOSIS — E11.9 TYPE 2 DIABETES MELLITUS W/OUT COMPLICATIONS: ICD-10-CM

## 2025-04-28 DIAGNOSIS — Z97.8 PRESENCE OF OTHER SPECIFIED DEVICES: ICD-10-CM

## 2025-04-28 DIAGNOSIS — E66.811 OBESITY, CLASS 1: ICD-10-CM

## 2025-04-28 LAB
GLUCOSE BLDC GLUCOMTR-MCNC: 171
HBA1C MFR BLD HPLC: 7.3

## 2025-04-28 PROCEDURE — 83036 HEMOGLOBIN GLYCOSYLATED A1C: CPT | Mod: QW

## 2025-04-28 PROCEDURE — 82962 GLUCOSE BLOOD TEST: CPT

## 2025-04-28 PROCEDURE — 95251 CONT GLUC MNTR ANALYSIS I&R: CPT

## 2025-04-28 PROCEDURE — 99214 OFFICE O/P EST MOD 30 MIN: CPT

## 2025-08-28 ENCOUNTER — APPOINTMENT (OUTPATIENT)
Dept: ENDOCRINOLOGY | Facility: CLINIC | Age: 50
End: 2025-08-28

## 2025-09-18 ENCOUNTER — APPOINTMENT (OUTPATIENT)
Dept: RHEUMATOLOGY | Facility: CLINIC | Age: 50
End: 2025-09-18
Payer: COMMERCIAL

## 2025-09-18 VITALS
BODY MASS INDEX: 32.47 KG/M2 | DIASTOLIC BLOOD PRESSURE: 84 MMHG | HEIGHT: 74 IN | WEIGHT: 253 LBS | HEART RATE: 87 BPM | SYSTOLIC BLOOD PRESSURE: 131 MMHG | OXYGEN SATURATION: 98 %

## 2025-09-18 DIAGNOSIS — J38.6 STENOSIS OF LARYNX: ICD-10-CM

## 2025-09-18 DIAGNOSIS — E11.9 TYPE 2 DIABETES MELLITUS W/OUT COMPLICATIONS: ICD-10-CM

## 2025-09-18 DIAGNOSIS — R22.2 LOCALIZED SWELLING, MASS AND LUMP, TRUNK: ICD-10-CM

## 2025-09-18 LAB
ALBUMIN SERPL ELPH-MCNC: 3.9 G/DL
ALP BLD-CCNC: 48 U/L
ALT SERPL-CCNC: 24 U/L
ANION GAP SERPL CALC-SCNC: 13 MMOL/L
AST SERPL-CCNC: 17 U/L
BASOPHILS # BLD AUTO: 0.03 K/UL
BASOPHILS NFR BLD AUTO: 0.4 %
BILIRUB SERPL-MCNC: 0.4 MG/DL
BUN SERPL-MCNC: 16 MG/DL
CALCIUM SERPL-MCNC: 8.9 MG/DL
CHLORIDE SERPL-SCNC: 102 MMOL/L
CO2 SERPL-SCNC: 25 MMOL/L
CREAT SERPL-MCNC: 1.08 MG/DL
CRP SERPL-MCNC: 11 MG/L
EGFRCR SERPLBLD CKD-EPI 2021: 84 ML/MIN/1.73M2
EOSINOPHIL # BLD AUTO: 0.15 K/UL
EOSINOPHIL NFR BLD AUTO: 2.2 %
ERYTHROCYTE [SEDIMENTATION RATE] IN BLOOD BY WESTERGREN METHOD: 44 MM/HR
GLUCOSE SERPL-MCNC: 147 MG/DL
HCT VFR BLD CALC: 41.2 %
HGB BLD-MCNC: 13.4 G/DL
IMM GRANULOCYTES NFR BLD AUTO: 0.1 %
LYMPHOCYTES # BLD AUTO: 3.01 K/UL
LYMPHOCYTES NFR BLD AUTO: 43.4 %
MAN DIFF?: NORMAL
MCHC RBC-ENTMCNC: 30.5 PG
MCHC RBC-ENTMCNC: 32.5 G/DL
MCV RBC AUTO: 93.8 FL
MONOCYTES # BLD AUTO: 0.68 K/UL
MONOCYTES NFR BLD AUTO: 9.8 %
NEUTROPHILS # BLD AUTO: 3.05 K/UL
NEUTROPHILS NFR BLD AUTO: 44.1 %
PLATELET # BLD AUTO: 243 K/UL
POTASSIUM SERPL-SCNC: 4.1 MMOL/L
PROT SERPL-MCNC: 7.3 G/DL
RBC # BLD: 4.39 M/UL
RBC # FLD: 14.6 %
SODIUM SERPL-SCNC: 140 MMOL/L
WBC # FLD AUTO: 6.93 K/UL

## 2025-09-18 PROCEDURE — G2211 COMPLEX E/M VISIT ADD ON: CPT | Mod: NC

## 2025-09-18 PROCEDURE — 99214 OFFICE O/P EST MOD 30 MIN: CPT

## 2025-09-18 RX ORDER — PREDNISONE 50 MG/1
50 TABLET ORAL
Qty: 3 | Refills: 0 | Status: ACTIVE | COMMUNITY
Start: 2025-09-18 | End: 1900-01-01

## 2025-09-18 RX ORDER — CAMPHOR 0.45 %
25 GEL (GRAM) TOPICAL
Qty: 5 | Refills: 0 | Status: ACTIVE | COMMUNITY
Start: 2025-09-18 | End: 1900-01-01

## 2025-09-20 LAB
ALBUMIN MFR SERPL ELPH: 51.7 %
ALBUMIN SERPL-MCNC: 3.8 G/DL
ALBUMIN/GLOB SERPL: 1.1 RATIO
ALPHA1 GLOB MFR SERPL ELPH: 4.9 %
ALPHA1 GLOB SERPL ELPH-MCNC: 0.4 G/DL
ALPHA2 GLOB MFR SERPL ELPH: 11.4 %
ALPHA2 GLOB SERPL ELPH-MCNC: 0.8 G/DL
B-GLOBULIN MFR SERPL ELPH: 14.3 %
B-GLOBULIN SERPL ELPH-MCNC: 1 G/DL
DEPRECATED KAPPA LC FREE/LAMBDA SER: 0.95 RATIO
GAMMA GLOB FLD ELPH-MCNC: 1.3 G/DL
GAMMA GLOB MFR SERPL ELPH: 17.7 %
IGA SERPL-MCNC: 451 MG/DL
IGG SERPL-MCNC: 1255 MG/DL
IGM SERPL-MCNC: 102 MG/DL
INTERPRETATION SERPL IEP-IMP: NORMAL
KAPPA LC CSF-MCNC: 3.01 MG/DL
KAPPA LC SERPL-MCNC: 2.85 MG/DL
M PROTEIN SPEC IFE-MCNC: NORMAL
PROT SERPL-MCNC: 7.3 G/DL
PROT SERPL-MCNC: 7.3 G/DL

## 2025-09-21 PROBLEM — R22.2 CHEST WALL MASS: Status: ACTIVE | Noted: 2025-09-18
